# Patient Record
Sex: FEMALE | Race: BLACK OR AFRICAN AMERICAN | Employment: OTHER | URBAN - METROPOLITAN AREA
[De-identification: names, ages, dates, MRNs, and addresses within clinical notes are randomized per-mention and may not be internally consistent; named-entity substitution may affect disease eponyms.]

---

## 2017-07-07 ENCOUNTER — HOSPITAL ENCOUNTER (OUTPATIENT)
Dept: ULTRASOUND IMAGING | Age: 67
Discharge: HOME | End: 2017-07-07
Attending: INTERNAL MEDICINE
Payer: COMMERCIAL

## 2017-07-07 DIAGNOSIS — E04.9 GOITER: ICD-10-CM

## 2017-07-07 PROCEDURE — 76536 US EXAM OF HEAD AND NECK: CPT

## 2018-02-12 ENCOUNTER — HOSPITAL ENCOUNTER (OUTPATIENT)
Dept: MAMMOGRAPHY | Age: 68
Discharge: HOME | End: 2018-02-12
Attending: FAMILY MEDICINE
Payer: COMMERCIAL

## 2018-02-12 DIAGNOSIS — Z12.39 SCREENING FOR MALIGNANT NEOPLASM OF BREAST: ICD-10-CM

## 2018-02-12 PROCEDURE — 77067 SCR MAMMO BI INCL CAD: CPT

## 2018-05-08 ENCOUNTER — HOSPITAL ENCOUNTER (OUTPATIENT)
Dept: MAMMOGRAPHY | Age: 68
Discharge: HOME | End: 2018-05-08
Attending: FAMILY MEDICINE
Payer: COMMERCIAL

## 2018-05-08 DIAGNOSIS — Z78.0 MENOPAUSE: ICD-10-CM

## 2018-05-08 PROCEDURE — 77080 DXA BONE DENSITY AXIAL: CPT

## 2018-07-13 ENCOUNTER — HOSPITAL ENCOUNTER (OUTPATIENT)
Dept: ULTRASOUND IMAGING | Age: 68
Discharge: HOME | End: 2018-07-13
Attending: INTERNAL MEDICINE
Payer: COMMERCIAL

## 2018-07-13 DIAGNOSIS — E04.9 GOITER: ICD-10-CM

## 2018-07-13 PROCEDURE — 76536 US EXAM OF HEAD AND NECK: CPT

## 2019-02-15 ENCOUNTER — HOSPITAL ENCOUNTER (OUTPATIENT)
Dept: MAMMOGRAPHY | Age: 69
Discharge: HOME | End: 2019-02-15
Attending: FAMILY MEDICINE
Payer: COMMERCIAL

## 2019-02-15 DIAGNOSIS — Z12.39 SCREENING BREAST EXAMINATION: ICD-10-CM

## 2019-02-15 PROCEDURE — 77067 SCR MAMMO BI INCL CAD: CPT

## 2020-10-13 ENCOUNTER — TRANSCRIBE ORDER (OUTPATIENT)
Dept: SCHEDULING | Age: 70
End: 2020-10-13

## 2020-10-13 DIAGNOSIS — Z12.31 BREAST CANCER SCREENING BY MAMMOGRAM: Primary | ICD-10-CM

## 2020-11-05 ENCOUNTER — HOSPITAL ENCOUNTER (OUTPATIENT)
Dept: MAMMOGRAPHY | Age: 70
Discharge: HOME | End: 2020-11-05
Attending: FAMILY MEDICINE
Payer: COMMERCIAL

## 2020-11-05 DIAGNOSIS — Z12.31 BREAST CANCER SCREENING BY MAMMOGRAM: ICD-10-CM

## 2020-11-05 PROCEDURE — 77063 BREAST TOMOSYNTHESIS BI: CPT

## 2021-10-13 ENCOUNTER — TRANSCRIBE ORDER (OUTPATIENT)
Dept: SCHEDULING | Age: 71
End: 2021-10-13

## 2021-10-13 DIAGNOSIS — Z12.31 SCREENING MAMMOGRAM FOR HIGH-RISK PATIENT: Primary | ICD-10-CM

## 2021-12-02 ENCOUNTER — HOSPITAL ENCOUNTER (OUTPATIENT)
Dept: MAMMOGRAPHY | Age: 71
Discharge: HOME | End: 2021-12-02
Attending: FAMILY MEDICINE
Payer: MEDICARE

## 2021-12-02 DIAGNOSIS — Z12.31 SCREENING MAMMOGRAM FOR HIGH-RISK PATIENT: ICD-10-CM

## 2021-12-02 PROCEDURE — 77067 SCR MAMMO BI INCL CAD: CPT

## 2022-06-02 ENCOUNTER — TRANSCRIBE ORDER (OUTPATIENT)
Dept: SCHEDULING | Age: 72
End: 2022-06-02

## 2022-06-02 DIAGNOSIS — E04.2 MULTINODULAR GOITER: Primary | ICD-10-CM

## 2022-06-09 ENCOUNTER — HOSPITAL ENCOUNTER (OUTPATIENT)
Dept: ULTRASOUND IMAGING | Age: 72
Discharge: HOME | End: 2022-06-09
Attending: INTERNAL MEDICINE
Payer: MEDICARE

## 2022-06-09 DIAGNOSIS — E04.2 MULTINODULAR GOITER: ICD-10-CM

## 2022-06-09 PROCEDURE — 76536 US EXAM OF HEAD AND NECK: CPT

## 2022-07-26 ENCOUNTER — APPOINTMENT (OUTPATIENT)
Dept: CT IMAGING | Age: 72
DRG: 163 | End: 2022-07-26
Attending: EMERGENCY MEDICINE
Payer: MEDICARE

## 2022-07-26 ENCOUNTER — HOSPITAL ENCOUNTER (INPATIENT)
Age: 72
LOS: 7 days | Discharge: HOME | DRG: 163 | End: 2022-08-02
Attending: EMERGENCY MEDICINE | Admitting: INTERNAL MEDICINE
Payer: MEDICARE

## 2022-07-26 ENCOUNTER — APPOINTMENT (OUTPATIENT)
Dept: GENERAL RADIOLOGY | Age: 72
DRG: 163 | End: 2022-07-26
Attending: EMERGENCY MEDICINE
Payer: MEDICARE

## 2022-07-26 DIAGNOSIS — R07.9 CHEST PAIN, UNSPECIFIED TYPE: ICD-10-CM

## 2022-07-26 DIAGNOSIS — I21.4 NSTEMI (NON-ST ELEVATED MYOCARDIAL INFARCTION) (HCC): Primary | ICD-10-CM

## 2022-07-26 LAB
ALBUMIN SERPL-MCNC: 3 G/DL (ref 3.5–5)
ALBUMIN/GLOB SERPL: 0.8 {RATIO} (ref 1.1–2.2)
ALP SERPL-CCNC: 90 U/L (ref 45–117)
ALT SERPL-CCNC: 110 U/L (ref 12–78)
ANION GAP SERPL CALC-SCNC: 9 MMOL/L (ref 5–15)
APTT PPP: 20.6 SEC (ref 21.2–34.1)
AST SERPL W P-5'-P-CCNC: 109 U/L (ref 15–37)
BASOPHILS # BLD: 0 K/UL (ref 0–0.1)
BASOPHILS NFR BLD: 0 % (ref 0–1)
BILIRUB SERPL-MCNC: 1 MG/DL (ref 0.2–1)
BUN SERPL-MCNC: 16 MG/DL (ref 6–20)
BUN/CREAT SERPL: 21 (ref 12–20)
CA-I BLD-MCNC: 8.2 MG/DL (ref 8.5–10.1)
CHLORIDE SERPL-SCNC: 112 MMOL/L (ref 97–108)
CO2 SERPL-SCNC: 21 MMOL/L (ref 21–32)
CREAT SERPL-MCNC: 0.75 MG/DL (ref 0.55–1.02)
DIFFERENTIAL METHOD BLD: ABNORMAL
EOSINOPHIL # BLD: 0 K/UL (ref 0–0.4)
EOSINOPHIL NFR BLD: 0 % (ref 0–7)
ERYTHROCYTE [DISTWIDTH] IN BLOOD BY AUTOMATED COUNT: 14.4 % (ref 11.5–14.5)
GLOBULIN SER CALC-MCNC: 3.7 G/DL (ref 2–4)
GLUCOSE SERPL-MCNC: 110 MG/DL (ref 65–100)
HCT VFR BLD AUTO: 27.5 % (ref 35–47)
HGB BLD-MCNC: 8.9 G/DL (ref 11.5–16)
IMM GRANULOCYTES # BLD AUTO: 0 K/UL (ref 0–0.04)
IMM GRANULOCYTES NFR BLD AUTO: 0 % (ref 0–0.5)
LYMPHOCYTES # BLD: 1.2 K/UL (ref 0.8–3.5)
LYMPHOCYTES NFR BLD: 17 % (ref 12–49)
MAGNESIUM SERPL-MCNC: 1.9 MG/DL (ref 1.6–2.4)
MCH RBC QN AUTO: 29 PG (ref 26–34)
MCHC RBC AUTO-ENTMCNC: 32.4 G/DL (ref 30–36.5)
MCV RBC AUTO: 89.6 FL (ref 80–99)
MONOCYTES # BLD: 0.4 K/UL (ref 0–1)
MONOCYTES NFR BLD: 5 % (ref 5–13)
NEUTS SEG # BLD: 5.4 K/UL (ref 1.8–8)
NEUTS SEG NFR BLD: 78 % (ref 32–75)
NRBC # BLD: 0 K/UL (ref 0–0.01)
NRBC BLD-RTO: 0 PER 100 WBC
PLATELET # BLD AUTO: 97 K/UL (ref 150–400)
PMV BLD AUTO: 12.3 FL (ref 8.9–12.9)
POTASSIUM SERPL-SCNC: 3.8 MMOL/L (ref 3.5–5.1)
PROT SERPL-MCNC: 6.7 G/DL (ref 6.4–8.2)
RBC # BLD AUTO: 3.07 M/UL (ref 3.8–5.2)
SODIUM SERPL-SCNC: 142 MMOL/L (ref 136–145)
THERAPEUTIC RANGE,PTTT: ABNORMAL SEC (ref 82–109)
TROPONIN-HIGH SENSITIVITY: 341 NG/L (ref 0–51)
WBC # BLD AUTO: 7 K/UL (ref 3.6–11)

## 2022-07-26 PROCEDURE — 71045 X-RAY EXAM CHEST 1 VIEW: CPT

## 2022-07-26 PROCEDURE — 70450 CT HEAD/BRAIN W/O DYE: CPT

## 2022-07-26 PROCEDURE — 85730 THROMBOPLASTIN TIME PARTIAL: CPT

## 2022-07-26 PROCEDURE — 85025 COMPLETE CBC W/AUTO DIFF WBC: CPT

## 2022-07-26 PROCEDURE — 80053 COMPREHEN METABOLIC PANEL: CPT

## 2022-07-26 PROCEDURE — 36415 COLL VENOUS BLD VENIPUNCTURE: CPT

## 2022-07-26 PROCEDURE — 96374 THER/PROPH/DIAG INJ IV PUSH: CPT

## 2022-07-26 PROCEDURE — 84484 ASSAY OF TROPONIN QUANT: CPT

## 2022-07-26 PROCEDURE — 74011250636 HC RX REV CODE- 250/636: Performed by: EMERGENCY MEDICINE

## 2022-07-26 PROCEDURE — 65270000029 HC RM PRIVATE

## 2022-07-26 PROCEDURE — 93005 ELECTROCARDIOGRAM TRACING: CPT

## 2022-07-26 PROCEDURE — 99285 EMERGENCY DEPT VISIT HI MDM: CPT

## 2022-07-26 PROCEDURE — 83735 ASSAY OF MAGNESIUM: CPT

## 2022-07-26 RX ORDER — HEPARIN SODIUM 1000 [USP'U]/ML
4000 INJECTION, SOLUTION INTRAVENOUS; SUBCUTANEOUS AS NEEDED
Status: DISCONTINUED | OUTPATIENT
Start: 2022-07-26 | End: 2022-07-31

## 2022-07-26 RX ORDER — LOSARTAN POTASSIUM 25 MG/1
25 TABLET ORAL DAILY
Status: DISCONTINUED | OUTPATIENT
Start: 2022-07-27 | End: 2022-08-02 | Stop reason: HOSPADM

## 2022-07-26 RX ORDER — NITROGLYCERIN 0.4 MG/1
0.4 TABLET SUBLINGUAL AS NEEDED
Status: DISCONTINUED | OUTPATIENT
Start: 2022-07-26 | End: 2022-08-02 | Stop reason: HOSPADM

## 2022-07-26 RX ORDER — ONDANSETRON 2 MG/ML
4 INJECTION INTRAMUSCULAR; INTRAVENOUS
Status: DISCONTINUED | OUTPATIENT
Start: 2022-07-26 | End: 2022-08-02 | Stop reason: HOSPADM

## 2022-07-26 RX ORDER — SODIUM CHLORIDE 0.9 % (FLUSH) 0.9 %
5-40 SYRINGE (ML) INJECTION EVERY 8 HOURS
Status: DISCONTINUED | OUTPATIENT
Start: 2022-07-26 | End: 2022-08-02 | Stop reason: HOSPADM

## 2022-07-26 RX ORDER — ACETAMINOPHEN 325 MG/1
650 TABLET ORAL
Status: DISCONTINUED | OUTPATIENT
Start: 2022-07-26 | End: 2022-08-02 | Stop reason: HOSPADM

## 2022-07-26 RX ORDER — ATORVASTATIN CALCIUM 40 MG/1
40 TABLET, FILM COATED ORAL
Status: DISCONTINUED | OUTPATIENT
Start: 2022-07-26 | End: 2022-07-27

## 2022-07-26 RX ORDER — ASPIRIN 325 MG
325 TABLET ORAL
Status: DISPENSED | OUTPATIENT
Start: 2022-07-26 | End: 2022-07-27

## 2022-07-26 RX ORDER — ONDANSETRON 4 MG/1
4 TABLET, ORALLY DISINTEGRATING ORAL
Status: DISCONTINUED | OUTPATIENT
Start: 2022-07-26 | End: 2022-08-02 | Stop reason: HOSPADM

## 2022-07-26 RX ORDER — HEPARIN SODIUM 10000 [USP'U]/100ML
12-25 INJECTION, SOLUTION INTRAVENOUS
Status: DISCONTINUED | OUTPATIENT
Start: 2022-07-26 | End: 2022-07-31

## 2022-07-26 RX ORDER — METOPROLOL SUCCINATE 25 MG/1
25 TABLET, EXTENDED RELEASE ORAL DAILY
Status: DISCONTINUED | OUTPATIENT
Start: 2022-07-27 | End: 2022-07-31

## 2022-07-26 RX ORDER — POLYETHYLENE GLYCOL 3350 17 G/17G
17 POWDER, FOR SOLUTION ORAL DAILY PRN
Status: DISCONTINUED | OUTPATIENT
Start: 2022-07-26 | End: 2022-08-02 | Stop reason: HOSPADM

## 2022-07-26 RX ORDER — HEPARIN SODIUM 1000 [USP'U]/ML
4000 INJECTION, SOLUTION INTRAVENOUS; SUBCUTANEOUS ONCE
Status: COMPLETED | OUTPATIENT
Start: 2022-07-26 | End: 2022-07-26

## 2022-07-26 RX ORDER — ACETAMINOPHEN 650 MG/1
650 SUPPOSITORY RECTAL
Status: DISCONTINUED | OUTPATIENT
Start: 2022-07-26 | End: 2022-08-02 | Stop reason: HOSPADM

## 2022-07-26 RX ORDER — SODIUM CHLORIDE 0.9 % (FLUSH) 0.9 %
5-40 SYRINGE (ML) INJECTION AS NEEDED
Status: DISCONTINUED | OUTPATIENT
Start: 2022-07-26 | End: 2022-08-02 | Stop reason: HOSPADM

## 2022-07-26 RX ORDER — HEPARIN SODIUM 1000 [USP'U]/ML
2000 INJECTION, SOLUTION INTRAVENOUS; SUBCUTANEOUS AS NEEDED
Status: DISCONTINUED | OUTPATIENT
Start: 2022-07-26 | End: 2022-07-31

## 2022-07-26 RX ADMIN — HEPARIN SODIUM 12 UNITS/KG/HR: 10000 INJECTION, SOLUTION INTRAVENOUS at 22:11

## 2022-07-26 RX ADMIN — HEPARIN SODIUM 4000 UNITS: 1000 INJECTION, SOLUTION INTRAVENOUS; SUBCUTANEOUS at 22:10

## 2022-07-26 NOTE — ED TRIAGE NOTES
EMS dispatched for person c/o chest pain, SOB, Nausea, dizziness for the past 2 days.   EMS gave Zofran and 200ml IVF (glu 174)

## 2022-07-26 NOTE — ED PROVIDER NOTES
EMERGENCY DEPARTMENT HISTORY AND PHYSICAL EXAM      Date: 7/26/2022  Patient Name: Alicia Kirk    History of Presenting Illness     Chief Complaint   Patient presents with    Chest Pain    Dizziness    Shortness of Breath       History Provided By: Patient    HPI: Alicia Kirk, 67 y.o. female with a significant past medical history of hypertension and vertigo presents to the ED with 2 days history of shortness of breath and dizziness. Patient reports the dizziness is unsteadiness, worse with standing, improved with rest.  Patient reports the pain is sharp, substernal, without radiation, without noted aggravating relieving factors. There are no other complaints, changes, or physical findings at this time. PCP: Lucrecia Kenney, DO    No current facility-administered medications on file prior to encounter. No current outpatient medications on file prior to encounter. Past History     Past Medical History:  Past Medical History:   Diagnosis Date    Hypertension        Past Surgical History:  History reviewed. No pertinent surgical history. Family History:  History reviewed. No pertinent family history. Social History:  Social History     Tobacco Use    Smoking status: Never    Smokeless tobacco: Never   Substance Use Topics    Alcohol use: Never    Drug use: Never       Allergies: Allergies   Allergen Reactions    Codeine Other (comments)     Gets real dizzy and nauseated. Nsaids (Non-Steroidal Anti-Inflammatory Drug) Other (comments)     Instant h/a and stomach burning. Sulfa (Sulfonamide Antibiotics) Hives     Rash that looks like measles. Review of Systems   Review of Systems  Review of Systems   Constitutional: Negative for chills and fever. HENT: Negative for sinus pressure and sinus pain. Eyes: Negative for photophobia and redness. Respiratory: Positive for shortness of breath and negative for wheezing.     Cardiovascular: Positive for chest pain and negative for palpitations. Gastrointestinal: Negative for abdominal pain and positive for nausea. Genitourinary: Negative for flank pain and hematuria. Musculoskeletal: Negative for arthralgias and gait problem. Skin: Negative for color change and pallor. Neurological: Positive for dizziness and weakness. Physical Exam   Physical Exam  Physical Exam  Constitutional:       General: No acute distress. Appearance: Normal appearance. Not toxic-appearing. HENT:      Head: Normocephalic and atraumatic. Nose: Nose normal.      Mouth/Throat:      Mouth: Mucous membranes are moist.   Eyes:      Extraocular Movements: Extraocular movements intact. Pupils: Pupils are equal, round, and reactive to light. Cardiovascular:      Rate and Rhythm: Normal rate. S1-S2 without murmurs rubs or gallops     Pulses: Normal pulses. Pulmonary:      Effort: Pulmonary effort is normal.      Breath sounds: No stridor, clear to auscultation bilaterally  Abdominal:      General: Abdomen is flat. There is no distension. Musculoskeletal:         General: Normal range of motion. Cervical back: Normal range of motion and neck supple. Skin:     General: Skin is warm and dry. Capillary Refill: Capillary refill takes less than 2 seconds. Neurological:      General: No focal deficit present. No focal neurodeficits. Mental Status: Alert and oriented to person, place, and time.    Psychiatric:         Mood and Affect: Mood normal.         Behavior: Behavior normal.    Lab and Diagnostic Study Results   Labs -     Recent Results (from the past 12 hour(s))   CBC WITH AUTOMATED DIFF    Collection Time: 07/26/22  7:00 PM   Result Value Ref Range    WBC 7.0 3.6 - 11.0 K/uL    RBC 3.07 (L) 3.80 - 5.20 M/uL    HGB 8.9 (L) 11.5 - 16.0 g/dL    HCT 27.5 (L) 35.0 - 47.0 %    MCV 89.6 80.0 - 99.0 FL    MCH 29.0 26.0 - 34.0 PG    MCHC 32.4 30.0 - 36.5 g/dL    RDW 14.4 11.5 - 14.5 %    PLATELET 97 (L) 974 - 400 K/uL MPV 12.3 8.9 - 12.9 FL    NRBC 0.0 0.0  WBC    ABSOLUTE NRBC 0.00 0.00 - 0.01 K/uL    NEUTROPHILS 78 (H) 32 - 75 %    LYMPHOCYTES 17 12 - 49 %    MONOCYTES 5 5 - 13 %    EOSINOPHILS 0 0 - 7 %    BASOPHILS 0 0 - 1 %    IMMATURE GRANULOCYTES 0 0 - 0.5 %    ABS. NEUTROPHILS 5.4 1.8 - 8.0 K/UL    ABS. LYMPHOCYTES 1.2 0.8 - 3.5 K/UL    ABS. MONOCYTES 0.4 0.0 - 1.0 K/UL    ABS. EOSINOPHILS 0.0 0.0 - 0.4 K/UL    ABS. BASOPHILS 0.0 0.0 - 0.1 K/UL    ABS. IMM. GRANS. 0.0 0.00 - 0.04 K/UL    DF AUTOMATED     METABOLIC PANEL, COMPREHENSIVE    Collection Time: 07/26/22  7:00 PM   Result Value Ref Range    Sodium 142 136 - 145 mmol/L    Potassium 3.8 3.5 - 5.1 mmol/L    Chloride 112 (H) 97 - 108 mmol/L    CO2 21 21 - 32 mmol/L    Anion gap 9 5 - 15 mmol/L    Glucose 110 (H) 65 - 100 mg/dL    BUN 16 6 - 20 mg/dL    Creatinine 0.75 0.55 - 1.02 mg/dL    BUN/Creatinine ratio 21 (H) 12 - 20      GFR est AA >60 >60 ml/min/1.73m2    GFR est non-AA >60 >60 ml/min/1.73m2    Calcium 8.2 (L) 8.5 - 10.1 mg/dL    Bilirubin, total 1.0 0.2 - 1.0 mg/dL    AST (SGOT) 109 (H) 15 - 37 U/L    ALT (SGPT) 110 (H) 12 - 78 U/L    Alk. phosphatase 90 45 - 117 U/L    Protein, total 6.7 6.4 - 8.2 g/dL    Albumin 3.0 (L) 3.5 - 5.0 g/dL    Globulin 3.7 2.0 - 4.0 g/dL    A-G Ratio 0.8 (L) 1.1 - 2.2     TROPONIN-HIGH SENSITIVITY    Collection Time: 07/26/22  7:00 PM   Result Value Ref Range    Troponin-High Sensitivity 341 (HH) 0 - 51 ng/L   MAGNESIUM    Collection Time: 07/26/22  7:00 PM   Result Value Ref Range    Magnesium 1.9 1.6 - 2.4 mg/dL   PTT    Collection Time: 07/26/22  9:35 PM   Result Value Ref Range    aPTT 20.6 (L) 21.2 - 34.1 sec    aPTT, therapeutic range   82 - 109 sec       Radiologic Studies -   @lastxrresult@  CT Results  (Last 48 hours)                 07/26/22 1938  CT HEAD WO CONT Final result    Impression:  No acute intracranial process.            Narrative:  CLINICAL HISTORY: Dizziness/unsteadiness   INDICATION: Dizziness/unsteadiness   COMPARISON: None. CT dose reduction was achieved through use of a standardized protocol tailored   for this examination and automatic exposure control for dose modulation. TECHNIQUE: Serial axial images with a collimation of 5 mm were obtained from the   skull base through the vertex     FINDINGS:    The sulci and ventricles are within normal limits for patient age. There is no   evidence of an acute infarction, hemorrhage, or mass-effect. There is no   evidence of midline shift or hydrocephalus. Posterior fossa structures are   unremarkable. No extra-axial collections are seen. Mastoid air cells are well pneumatized and clear. There is no evidence of depressed skull fractures of soft tissue swelling. CXR Results  (Last 48 hours)                 07/26/22 1918  XR CHEST PORT Final result    Impression:  No acute process. Narrative: Indication: Chest pain       Comparison: None       Portable exam of the chest obtained at [address] demonstrates normal heart size. There is no acute process in the lung fields. The osseous structures are   unremarkable. Medical Decision Making and ED Course   - I am the first provider for this patient. I reviewed the vital signs, available nursing notes, past medical history, past surgical history, family history and social history. - Initial assessment performed. The patients presenting problems have been discussed, and they are in agreement with the care plan formulated and outlined with them. I have encouraged them to ask questions as they arise throughout their visit. Vital Signs-Reviewed the patient's vital signs.   Patient Vitals for the past 12 hrs:   Temp Pulse Resp BP SpO2   07/26/22 2048 -- (!) 104 -- 125/77 --   07/26/22 1821 -- (!) 103 27 125/77 98 %   07/26/22 1751 97.7 °F (36.5 °C) (!) 103 19 122/79 94 %            ED Course:   ED Course as of 07/26/22 2258 Tue Jul 26, 2022 2046 Discussed with patient the findings concerning for non-STEMI. No EKG for comparison, though there are some T wave abnormalities. Patient reports her cardiologist is Dr. Lokesh Frank with whom she has a telephone appointment tomorrow as he has recently done lower extremity Dopplers. Patient states she previously was on Xarelto but is not at this time, states it was only for her surgery. [CS]      ED Course User Index  [CS] Hema Mcnair MD       Disposition   Disposition: Admitted to Floor Medical Floor the case was discussed with the admitting physician        Diagnosis/Clinical Impression     Clinical Impression:   1. NSTEMI (non-ST elevated myocardial infarction) (HCC)    2. Chest pain, unspecified type        Attestations: I, Milady Veras MD, am the primary clinician of record. Please note that this dictation was completed with [employer], the computer voice recognition software. Quite often unanticipated grammatical, syntax, homophones, and other interpretive errors are inadvertently transcribed by the computer software. Please disregard these errors. Please excuse any errors that have escaped final proofreading. Thank you.

## 2022-07-27 ENCOUNTER — APPOINTMENT (OUTPATIENT)
Dept: CT IMAGING | Age: 72
DRG: 163 | End: 2022-07-27
Attending: INTERNAL MEDICINE
Payer: MEDICARE

## 2022-07-27 ENCOUNTER — APPOINTMENT (OUTPATIENT)
Dept: NON INVASIVE DIAGNOSTICS | Age: 72
DRG: 163 | End: 2022-07-27
Attending: INTERNAL MEDICINE
Payer: MEDICARE

## 2022-07-27 LAB
ANION GAP SERPL CALC-SCNC: 8 MMOL/L (ref 5–15)
APTT PPP: 146.3 SEC (ref 21.2–34.1)
APTT PPP: 56.8 SEC (ref 21.2–34.1)
APTT PPP: 73.1 SEC (ref 21.2–34.1)
ATRIAL RATE: 104 BPM
BUN SERPL-MCNC: 26 MG/DL (ref 6–20)
BUN/CREAT SERPL: 28 (ref 12–20)
CA-I BLD-MCNC: 8.9 MG/DL (ref 8.5–10.1)
CALCULATED P AXIS, ECG09: 27 DEGREES
CALCULATED R AXIS, ECG10: -3 DEGREES
CALCULATED T AXIS, ECG11: -56 DEGREES
CHLORIDE SERPL-SCNC: 110 MMOL/L (ref 97–108)
CO2 SERPL-SCNC: 23 MMOL/L (ref 21–32)
CREAT SERPL-MCNC: 0.92 MG/DL (ref 0.55–1.02)
D DIMER PPP FEU-MCNC: 13.42 UG/ML(FEU)
DIAGNOSIS, 93000: NORMAL
ECHO AO ROOT DIAM: 3.1 CM
ECHO AO ROOT INDEX: 1.55 CM/M2
ECHO AV PEAK GRADIENT: 4 MMHG
ECHO AV PEAK VELOCITY: 1.1 M/S
ECHO AV VELOCITY RATIO: 0.91
ECHO EST RA PRESSURE: 15 MMHG
ECHO LA DIAMETER INDEX: 1.5 CM/M2
ECHO LA DIAMETER: 3 CM
ECHO LA TO AORTIC ROOT RATIO: 0.97
ECHO LV E' LATERAL VELOCITY: 6 CM/S
ECHO LV E' SEPTAL VELOCITY: 5 CM/S
ECHO LV EJECTION FRACTION BIPLANE: 68 % (ref 55–100)
ECHO LV FRACTIONAL SHORTENING: 35 % (ref 28–44)
ECHO LV INTERNAL DIMENSION DIASTOLE INDEX: 1.3 CM/M2
ECHO LV INTERNAL DIMENSION DIASTOLIC: 2.6 CM (ref 3.9–5.3)
ECHO LV INTERNAL DIMENSION SYSTOLIC INDEX: 0.85 CM/M2
ECHO LV INTERNAL DIMENSION SYSTOLIC: 1.7 CM
ECHO LV IVSD: 1.5 CM (ref 0.6–0.9)
ECHO LV MASS 2D: 132.1 G (ref 67–162)
ECHO LV MASS INDEX 2D: 66 G/M2 (ref 43–95)
ECHO LV POSTERIOR WALL DIASTOLIC: 1.5 CM (ref 0.6–0.9)
ECHO LV RELATIVE WALL THICKNESS RATIO: 1.15
ECHO LVOT PEAK GRADIENT: 4 MMHG
ECHO LVOT PEAK VELOCITY: 1 M/S
ECHO MV A VELOCITY: 0.7 M/S
ECHO MV E DECELERATION TIME (DT): 169 MS
ECHO MV E VELOCITY: 0.41 M/S
ECHO MV E/A RATIO: 0.59
ECHO MV E/E' LATERAL: 6.83
ECHO MV E/E' RATIO (AVERAGED): 7.52
ECHO MV E/E' SEPTAL: 8.2
ECHO RIGHT VENTRICULAR SYSTOLIC PRESSURE (RVSP): 56 MMHG
ECHO RV INTERNAL DIMENSION: 4.1 CM
ECHO RV TAPSE: 1.8 CM (ref 1.7–?)
ECHO TV REGURGITANT MAX VELOCITY: 3.21 M/S
ECHO TV REGURGITANT PEAK GRADIENT: 41 MMHG
ERYTHROCYTE [DISTWIDTH] IN BLOOD BY AUTOMATED COUNT: 14.4 % (ref 11.5–14.5)
FERRITIN SERPL-MCNC: 280 NG/ML (ref 8–252)
FOLATE SERPL-MCNC: 17 NG/ML (ref 5–21)
GLUCOSE SERPL-MCNC: 136 MG/DL (ref 65–100)
HAPTOGLOB SERPL-MCNC: 175 MG/DL (ref 30–200)
HCT VFR BLD AUTO: 35.5 % (ref 35–47)
HGB BLD-MCNC: 11.9 G/DL (ref 11.5–16)
IRON SATN MFR SERPL: 27 % (ref 20–50)
IRON SERPL-MCNC: 76 UG/DL (ref 35–150)
LDH SERPL L TO P-CCNC: 272 U/L (ref 81–246)
MCH RBC QN AUTO: 29.4 PG (ref 26–34)
MCHC RBC AUTO-ENTMCNC: 33.5 G/DL (ref 30–36.5)
MCV RBC AUTO: 87.7 FL (ref 80–99)
NRBC # BLD: 0 K/UL (ref 0–0.01)
NRBC BLD-RTO: 0 PER 100 WBC
P-R INTERVAL, ECG05: 122 MS
PLATELET # BLD AUTO: 124 K/UL (ref 150–400)
PMV BLD AUTO: 11.8 FL (ref 8.9–12.9)
POTASSIUM SERPL-SCNC: 3.4 MMOL/L (ref 3.5–5.1)
Q-T INTERVAL, ECG07: 388 MS
QRS DURATION, ECG06: 82 MS
QTC CALCULATION (BEZET), ECG08: 510 MS
RBC # BLD AUTO: 4.05 M/UL (ref 3.8–5.2)
RETICS # AUTO: 0.1 M/UL (ref 0.02–0.08)
RETICS/RBC NFR AUTO: 2.3 % (ref 0.7–2.1)
SODIUM SERPL-SCNC: 141 MMOL/L (ref 136–145)
THERAPEUTIC RANGE,PTTT: ABNORMAL SEC (ref 82–109)
TIBC SERPL-MCNC: 284 UG/DL (ref 250–450)
TROPONIN-HIGH SENSITIVITY: 152 NG/L (ref 0–51)
TROPONIN-HIGH SENSITIVITY: 280 NG/L (ref 0–51)
TROPONIN-HIGH SENSITIVITY: 386 NG/L (ref 0–51)
VENTRICULAR RATE, ECG03: 104 BPM
VIT B12 SERPL-MCNC: 337 PG/ML (ref 193–986)
WBC # BLD AUTO: 9.7 K/UL (ref 3.6–11)

## 2022-07-27 PROCEDURE — 74011250637 HC RX REV CODE- 250/637: Performed by: INTERNAL MEDICINE

## 2022-07-27 PROCEDURE — 82607 VITAMIN B-12: CPT

## 2022-07-27 PROCEDURE — 74011250636 HC RX REV CODE- 250/636: Performed by: INTERNAL MEDICINE

## 2022-07-27 PROCEDURE — 65270000029 HC RM PRIVATE

## 2022-07-27 PROCEDURE — 83615 LACTATE (LD) (LDH) ENZYME: CPT

## 2022-07-27 PROCEDURE — 85730 THROMBOPLASTIN TIME PARTIAL: CPT

## 2022-07-27 PROCEDURE — 85045 AUTOMATED RETICULOCYTE COUNT: CPT

## 2022-07-27 PROCEDURE — 83540 ASSAY OF IRON: CPT

## 2022-07-27 PROCEDURE — 82728 ASSAY OF FERRITIN: CPT

## 2022-07-27 PROCEDURE — 84484 ASSAY OF TROPONIN QUANT: CPT

## 2022-07-27 PROCEDURE — 93306 TTE W/DOPPLER COMPLETE: CPT

## 2022-07-27 PROCEDURE — 80048 BASIC METABOLIC PNL TOTAL CA: CPT

## 2022-07-27 PROCEDURE — 83010 ASSAY OF HAPTOGLOBIN QUANT: CPT

## 2022-07-27 PROCEDURE — 74011000250 HC RX REV CODE- 250: Performed by: INTERNAL MEDICINE

## 2022-07-27 PROCEDURE — 71275 CT ANGIOGRAPHY CHEST: CPT

## 2022-07-27 PROCEDURE — 36415 COLL VENOUS BLD VENIPUNCTURE: CPT

## 2022-07-27 PROCEDURE — 85379 FIBRIN DEGRADATION QUANT: CPT

## 2022-07-27 PROCEDURE — 74011000636 HC RX REV CODE- 636: Performed by: INTERNAL MEDICINE

## 2022-07-27 PROCEDURE — 85027 COMPLETE CBC AUTOMATED: CPT

## 2022-07-27 RX ORDER — METOPROLOL SUCCINATE 25 MG/1
25 TABLET, EXTENDED RELEASE ORAL DAILY
COMMUNITY
End: 2022-08-02

## 2022-07-27 RX ORDER — ICOSAPENT ETHYL 1000 MG/1
2 CAPSULE ORAL 2 TIMES DAILY WITH MEALS
COMMUNITY

## 2022-07-27 RX ORDER — ACETAMINOPHEN 500 MG/1
500 CAPSULE, LIQUID FILLED ORAL AS NEEDED
COMMUNITY

## 2022-07-27 RX ORDER — LOSARTAN POTASSIUM 25 MG/1
25 TABLET ORAL DAILY
COMMUNITY

## 2022-07-27 RX ORDER — ROSUVASTATIN CALCIUM 5 MG/1
20 TABLET, COATED ORAL
Status: DISCONTINUED | OUTPATIENT
Start: 2022-07-27 | End: 2022-07-30

## 2022-07-27 RX ADMIN — HEPARIN SODIUM 2000 UNITS: 1000 INJECTION, SOLUTION INTRAVENOUS; SUBCUTANEOUS at 05:47

## 2022-07-27 RX ADMIN — METOPROLOL SUCCINATE 25 MG: 25 TABLET, EXTENDED RELEASE ORAL at 11:07

## 2022-07-27 RX ADMIN — IOPAMIDOL 100 ML: 755 INJECTION, SOLUTION INTRAVENOUS at 18:07

## 2022-07-27 RX ADMIN — LOSARTAN POTASSIUM 25 MG: 50 TABLET, FILM COATED ORAL at 11:07

## 2022-07-27 RX ADMIN — HEPARIN SODIUM 16 UNITS/KG/HR: 10000 INJECTION, SOLUTION INTRAVENOUS at 18:44

## 2022-07-27 RX ADMIN — SODIUM CHLORIDE, PRESERVATIVE FREE 10 ML: 5 INJECTION INTRAVENOUS at 21:01

## 2022-07-27 NOTE — ED NOTES
Pt. States she does not wear o2 at home. Writer removed oxygen from pt, o2 90% on room air.  Nasal cannula placed back on pt at 2L and o2 96%

## 2022-07-27 NOTE — H&P
History and Physical    Patient: Richard Garsia MRN: [id number]  SSN: [id number]    YOB: 1950  Age: 67 y.o. Sex: female      Subjective:      Richard Garsia is a 67 y.o. female with PMH of hypertension, HLP presented to the ED with a chief complaint of shortness of breath started yesterday. Worsening with mild exertion, associated with dizziness and left-sided chest pressure. Chest pressure is rated 5/10. Also has LE swelling, but attributes to previous knee surgery. Otherwise denies productive cough, wheezing, fever or chills. In ED, placed on 2 L nasal cannula. Troponin elevated at 300s. EKG showed no ST changes. Hemoglobin 8.9, plt 97, no baseline to compare. Past Medical History:   Diagnosis Date    Hypertension      History reviewed. No pertinent surgical history. History reviewed. No pertinent family history. Social History     Tobacco Use    Smoking status: Never    Smokeless tobacco: Never   Substance Use Topics    Alcohol use: Never      Prior to Admission medications    Not on File        Allergies   Allergen Reactions    Codeine Other (comments)     Gets real dizzy and nauseated. Nsaids (Non-Steroidal Anti-Inflammatory Drug) Other (comments)     Instant h/a and stomach burning. Sulfa (Sulfonamide Antibiotics) Hives     Rash that looks like measles.         Review of Systems:   Constitutional: No fevers, No chills, No fatigue, No weakness  Eyes: No visual disturbance  Ears, Nose, Mouth, Throat, and Face: No nasal congestion, No sore throat  Respiratory: See HPI  Cardiovascular: See HPI  Gastrointestinal: No nausea, No vomiting, No diarrhea, No constipation, No abdominal pain  Genitourinary: No frequency, No dysuria, No hematuria  Integument/Breast: No rash, No skin lesion(s), No dryness  Musculoskeletal: No arthralgias, No neck pain, No back pain  Neurological: No headaches, No dizziness, No confusion,  No seizures  Behavioral/Psychiatric: No anxiety, No depression      Objective:     Vitals:    07/26/22 2106 07/26/22 2206 07/26/22 2306 07/27/22 0036   BP: 126/68 122/76 134/86 129/70   Pulse: (!) 106 100 97 98   Resp: 19 (!) 31 (!) 33 (!) 34   Temp:    98.1 °F (36.7 °C)   SpO2:    96%   Weight:       Height:            Physical Exam:   General: alert, cooperative, no distress  Eye: conjunctivae/corneas clear. PERRL, EOM's intact. Throat and Neck: normal and no erythema or exudates noted. No mass   Lung: clear to auscultation bilaterally  Heart: regular rate and rhythm,   Abdomen: soft, non-tender. Bowel sounds normal. No masses,  Extremities: 1+ LE edema. able to move all extremities normal, atraumatic  Skin: Normal.  Neurologic: AOx3. Motor function and sensation grossly intact. Psychiatric: non focal    Recent Results (from the past 24 hour(s))   CBC WITH AUTOMATED DIFF    Collection Time: 07/26/22  7:00 PM   Result Value Ref Range    WBC 7.0 3.6 - 11.0 K/uL    RBC 3.07 (L) 3.80 - 5.20 M/uL    HGB 8.9 (L) 11.5 - 16.0 g/dL    HCT 27.5 (L) 35.0 - 47.0 %    MCV 89.6 80.0 - 99.0 FL    MCH 29.0 26.0 - 34.0 PG    MCHC 32.4 30.0 - 36.5 g/dL    RDW 14.4 11.5 - 14.5 %    PLATELET 97 (L) 802 - 400 K/uL    MPV 12.3 8.9 - 12.9 FL    NRBC 0.0 0.0  WBC    ABSOLUTE NRBC 0.00 0.00 - 0.01 K/uL    NEUTROPHILS 78 (H) 32 - 75 %    LYMPHOCYTES 17 12 - 49 %    MONOCYTES 5 5 - 13 %    EOSINOPHILS 0 0 - 7 %    BASOPHILS 0 0 - 1 %    IMMATURE GRANULOCYTES 0 0 - 0.5 %    ABS. NEUTROPHILS 5.4 1.8 - 8.0 K/UL    ABS. LYMPHOCYTES 1.2 0.8 - 3.5 K/UL    ABS. MONOCYTES 0.4 0.0 - 1.0 K/UL    ABS. EOSINOPHILS 0.0 0.0 - 0.4 K/UL    ABS. BASOPHILS 0.0 0.0 - 0.1 K/UL    ABS. IMM.  GRANS. 0.0 0.00 - 0.04 K/UL    DF AUTOMATED     METABOLIC PANEL, COMPREHENSIVE    Collection Time: 07/26/22  7:00 PM   Result Value Ref Range    Sodium 142 136 - 145 mmol/L    Potassium 3.8 3.5 - 5.1 mmol/L    Chloride 112 (H) 97 - 108 mmol/L    CO2 21 21 - 32 mmol/L    Anion gap 9 5 - 15 mmol/L    Glucose 110 (H) 65 - 100 mg/dL    BUN 16 6 - 20 mg/dL    Creatinine 0.75 0.55 - 1.02 mg/dL    BUN/Creatinine ratio 21 (H) 12 - 20      GFR est AA >60 >60 ml/min/1.73m2    GFR est non-AA >60 >60 ml/min/1.73m2    Calcium 8.2 (L) 8.5 - 10.1 mg/dL    Bilirubin, total 1.0 0.2 - 1.0 mg/dL    AST (SGOT) 109 (H) 15 - 37 U/L    ALT (SGPT) 110 (H) 12 - 78 U/L    Alk. phosphatase 90 45 - 117 U/L    Protein, total 6.7 6.4 - 8.2 g/dL    Albumin 3.0 (L) 3.5 - 5.0 g/dL    Globulin 3.7 2.0 - 4.0 g/dL    A-G Ratio 0.8 (L) 1.1 - 2.2     TROPONIN-HIGH SENSITIVITY    Collection Time: 07/26/22  7:00 PM   Result Value Ref Range    Troponin-High Sensitivity 341 (HH) 0 - 51 ng/L   MAGNESIUM    Collection Time: 07/26/22  7:00 PM   Result Value Ref Range    Magnesium 1.9 1.6 - 2.4 mg/dL   PTT    Collection Time: 07/26/22  9:35 PM   Result Value Ref Range    aPTT 20.6 (L) 21.2 - 34.1 sec    aPTT, therapeutic range   82 - 109 sec   TROPONIN-HIGH SENSITIVITY    Collection Time: 07/27/22 12:32 AM   Result Value Ref Range    Troponin-High Sensitivity 386 (HH) 0 - 51 ng/L       XR Results (maximum last 3): Results from Hospital Encounter encounter on 07/26/22    XR CHEST PORT    Narrative  Indication: Chest pain    Comparison: None    Portable exam of the chest obtained at [address] [place] [address] demonstrates normal heart size. There is no acute process in the lung fields. The osseous structures are  unremarkable. Impression  No acute process. CT Results (maximum last 3): Results from [place] encounter on 07/26/22    CT HEAD WO CONT    Narrative  CLINICAL HISTORY: Dizziness/unsteadiness  INDICATION: Dizziness/unsteadiness  COMPARISON: None. CT dose reduction was achieved through use of a standardized protocol tailored  for this examination and automatic exposure control for dose modulation.   TECHNIQUE: Serial axial images with a collimation of 5 mm were obtained from the  skull base through the vertex  FINDINGS:  The sulci and ventricles are within normal limits for patient age. There is no  evidence of an acute infarction, hemorrhage, or mass-effect. There is no  evidence of midline shift or hydrocephalus. Posterior fossa structures are  unremarkable. No extra-axial collections are seen. Mastoid air cells are well pneumatized and clear. There is no evidence of depressed skull fractures of soft tissue swelling. Impression  No acute intracranial process. Results from [place] encounter on 01/14/13    CT HEART W/O CONT WITH CALCIUM    Narrative  **Final Report**      ICD Codes / Adm. 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 TECHNIQUE: Unenhanced multislice helical CT of the heart was performed on a  64-slice multiple detector row CT system (from[employer]). Quantitative coronary  artery CT calcium scoring was performed using [employer] software. No intravenous  contrast was administered. FINDINGS:  The coronary calcium in each vessel is as follows:    Left main coronary artery: 22  Left anterior descending coronary artery: 0  Left circumflex coronary artery: 36  Right coronary artery: 0  Posterior descending coronary artery: 0    Total calcium score: 58    Calcium score interpretation:  0-0 = No evidence of CAD  1-10 = Minimal evidence of CAD   = Mild evidence of CAD  101-400 = Moderate evidence of CAD  >400 = Extensive evidence of CAD    Your score of 58 places you in the 75 percentile rank. That means that 25%  of the female aged 64-75 will have a higher calcium score than you. Chest CT findings:  The visualized lungs are clear of mass, nodule, airspace  or edema. The entire lung fields are not imaged on this examination. The  main pulmonary artery is enlarged, measuring 36 mm in diameter.  No  mediastinal mass or adenopathy is identified. The visualized portions of the  upper abdominal organs are normal on this unenhanced examination. IMPRESSION: Total calcium score of 58. Mild evidence of plaque. Mild  nonobstructive coronary artery disease is likely present. There is a less  than 10% chance of flow-limiting stenosis. At moderate risk of future  cardiac events. 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 Signing/Reading Doctor: Angel العراقي ([id number])  Agustín Portillo ([id number])  Jan 14 2013  1:14PM      MRI Results (maximum last 3): No results found for this or any previous visit. Nuclear Medicine Results (maximum last 3): Results from [place] encounter on 05/16/11    NM THYROID IMAGING W UPTAKE SINGLE    Narrative  **Final Report**      ICD Codes / Adm. Diagnosis: 240.9  240.9 / GOITER  GOITER, UNSPECIFIED  Examination:  NM THYROID SCAN W SINGLE UPTAKE  - [id number] - May 17 2011  3:20PM  Accession No:  [id number]  Reason:  goiter      REPORT:  CLINICAL HISTORY: The thyroid nodules. Comparison to ultrasound dated 01/06/2011. Thyroid scan was performed in the anterior, CENTENO, and [place] projections  utilizing 194.8 uCi I-123 orally. There is increased tracer activity in the  lower pole of the right gland. This likely represents a hot nodule. There is  more normal uptake in the left gland although it is somewhat diminutive  compared to the right. Uptake was determined at 24 hours and is 22.8%  (normal 15 to 30%). IMPRESSION: Hot nodule lower pole right gland. Normal uptake. Signing/Reading Doctor: Tiago Adams ([id number])  Transcribed:  on 05/17/2011  Approved: Tiago Adams ([id number])  05/17/2011        Distribution:  Attending Doctor: Sharron Dodge Results (maximum last 3):   Results from [place] encounter on 06/09/22    US THYROID/PARATHYROID/SOFT TISS    Narrative  INDICATION: Nontoxic multinodular goiter    FINDINGS: Routine ultrasound imaging of the thyroid gland was performed. The  right thyroid lobe measures 6.0 x 2.2 x 3.1 cm. The left thyroid lobe measures  5.8 x 2.2 x 2.0 cm. The thyroid isthmus measures 2 mm in thickness. The gland is  diffusely heterogeneous an nodular. 2.1 x 1.6 x 0.9 cm circumscribed solid  nodule at the upper pole of the right lobe previously measured 2.2 x 1.0 x 1.3  cm. 1.7 x 1.2 x 1.3 cm solid nodule in the midportion of the right lobe,  medially, previously measured 1.8 x 1.3 x 1.0 cm. 1.9 x 1.0 x 1.5 cm solid  nodule at the lower pole of the right lobe previously measured 1.9 x 1.5 x 1.0  cm. 1.0 x 1.0 x 0.8 cm mixed solid/cystic nodule at the upper pole the left lobe  previously measured 1.3 x 1.3 x 0.9 cm. 1.7 x 1.6 x 1.4 cm cystic nodule in the  midportion of the left lobe previously measured 1.6 x 0.9 x 0.9 cm. Cine imaging  of the bilateral neck demonstrates no cervical lymphadenopathy. Impression  The purely cystic nodule in the left thyroid lobe is increased in  size since 2018; this is benign in morphology. The solid nodules in both thyroid  lobes demonstrate no significant interval change. No new thyroid nodule is  evident. Results from [place] encounter on 07/13/18    US THYROID/PARATHYROID/SOFT TISS    Narrative  ULTRASOUND OF THE THYROID. INDICATION: Goiter    COMPARISON: 7/7/2017, 1/6/2011    TECHNIQUE: Sonography of the thyroid was performed. FINDINGS:    RIGHT LOBE: measures 15 x 19 x 20 mm. Replaced with next solid and cystic  nodules. Most of these show specular reflectors with ringdown artifact,  consistent with colloid. LEFT LOBE: measures 52 x 18 x 17 mm. Replaced with mixed cystic and solid  nodules and cysts. ISTHMUS: No nodule. PARENCHYMA: Replaced with nodules. No significant change from 7/7/2017. No significant change from 2011, given  differences in equipment. Impression  IMPRESSION:  Multinodular gland.  No nodules with overt malignant features. No significant  change compared to 2011      Results from [place] encounter on 07/07/17    US THYROID/PARATHYROID/SOFT TISS    Narrative  EXAM:  US THYROID/PARATHYROID/SOFT TISS    INDICATION: Thyroid goiter. COMPARISON: Prior exam 6/17/2011. TECHNIQUE: Real-time sonography of the thyroid gland was performed with a high  frequency linear transducer. Multiple static images were obtained. FINDINGS:  There are multiple mildly hypoechoic nodules in the right gland measuring 1.1 x  1.2 x 1.2 cm, 1.4 x 1.8 x 1.3 cm, and 1.4 x 1.7 x 0.8 cm. The largest nodule on  the previous exam measured 0.0 x 1.4 cm. 4 A nodule in the left gland measures 8  x 9 x 9 mm. The thyroid is heterogeneous in echotexture otherwise. The right lobe measures 2.1 x 2.5 x 5.9 cm and the left lobe measures 1.5 x 1.5  x 4.2 cm. The isthmus measures 3 mm. Impression  IMPRESSION: Multinodular goiter with no dominant or otherwise more suspicious  nodule. Pt's ABILIO Score = 2    ABILIO Score Calculation (1 point for each):  - Age >= 72  - Aspirin use in the last 7 days   - At least 2 angina episodes within the last 24hrs  - ST changes of at least 0.5mm on admission EKG  - Elevated serum cardiac biomarkers  - Known Coronary Artery Disease (CAD) (coronary stenosis >= 50%)  - At least 3 risk factors for CAD:  Hypertension -> 140/90 or on antihypertensives, Cigarette smoking, HDL < 40, Diabetes, Family history of premature CAD (CAD in male first-degree relative, or father less than 54, or female first-degree relative or mother less than 72). Score Interpretation:  % risk at 14 days of: all-cause mortality, new or recurrent MI, or severe recurrent ischemia requiring urgent revascularization.   Score of 0-1 = 4.7% risk  Score of 2 = 8.3% risk  Score of 3 = 13.2% risk  Score of 4 = 19.9% risk  Score of 5 = 26.2% risk  Score of 6-7 = at least 40.9% risk    Two or More Episodes of Severe Angina within 24 Hours: No  Elevated Cardiac Markers: Yes  ST Changes > 0.5 mm: No  Aspirin Use in the Past 7 Days: No  Age 65+: Yes  3+ CAD Risk Factors: No  CAD Stenosis >= 50%: No  ABILIO Risk Score (Calculated): 2         Assessment and plan:   # NSTEMI  - Start heparin ggt  - Trend troponin   - SL Nitro for chest pain as needed. - Consult cardiology   - ECHO ordered  - GDMT per cardiology. # Shortness of breath  - Likely secondary to above, no acute findings on chest X-ray  - Supplementary oxygen for now. - If worsening, consider consult pulmonary. # Anemia  -No clear source of bleeding  -Iron studies, reticulocyte count, hemolysis work-up and vitamin levels sent.  -Consult hematologist    #Thrombocytopenia  -Peripheral blood smear since  -Appreciate hematologist input    #Hypertension  - Continue home medications. # HLP  - Taking pitavastatin at home. Got cramps SE from atorvastatin. - We will start rosuvastatin.      # Full code by default, need further clarification    # Medication list reviewed on Epic and with patient/family        Signed By: Shayne Ridley MD     July 27, 2022

## 2022-07-27 NOTE — CONSULTS
Consult    Subjective:     Urban Side is a 67 y.o. female seen for ho is being seen for  thrombocytopenia and anemia. Pt admitted with SOB and dizziness. Pt also with some leg swelling. Pt troponin mildly elevated. Pt seen by cardiology. Cbc at admission showed HB 8.9 gms/dl,platelets 92P,EEI 8.7U..MP feeling tired. Denies any recent COVID infection. Denies bleeding. Pt on IV heparin. Past Medical History:   Diagnosis Date    Hypertension       History reviewed. No pertinent surgical history. History reviewed. No pertinent family history.    Social History     Tobacco Use    Smoking status: Never    Smokeless tobacco: Never   Substance Use Topics    Alcohol use: Never       Current Facility-Administered Medications   Medication Dose Route Frequency Provider Last Rate Last Admin    rosuvastatin (CRESTOR) tablet 20 mg  20 mg Oral QHS Saravanan Jones MD        heparin 25,000 units in D5W 250 ml infusion  12-25 Units/kg/hr (Adjusted) IntraVENous TITRATE Mily Hansen MD 11.5 mL/hr at 07/27/22 1319 16 Units/kg/hr at 07/27/22 1319    heparin (porcine) 1,000 unit/mL injection 4,000 Units  4,000 Units IntraVENous PRN Mily Hansen MD        Or    heparin (porcine) 1,000 unit/mL injection 2,000 Units  2,000 Units IntraVENous PRN Mily Hansen MD   2,000 Units at 07/27/22 0547    sodium chloride (NS) flush 5-40 mL  5-40 mL IntraVENous Q8H Cha, Carle Kayser, MD        sodium chloride (NS) flush 5-40 mL  5-40 mL IntraVENous PRN Mily Hansen MD        acetaminophen (TYLENOL) tablet 650 mg  650 mg Oral Q6H PRN Mily Hansen MD        Or    acetaminophen (TYLENOL) suppository 650 mg  650 mg Rectal Q6H PRN Cha, Carle Kayser, MD        polyethylene glycol (MIRALAX) packet 17 g  17 g Oral DAILY PRN Mily Hansen MD        ondansetron (ZOFRAN ODT) tablet 4 mg  4 mg Oral Q8H PRN Mily Hansen MD        Or    ondansetron (ZOFRAN) injection 4 mg  4 mg IntraVENous Q6H PRN Mily Hansen MD        nitroglycerin (NITROSTAT) tablet 0.4 mg 0.4 mg SubLINGual PRN Kenna Jones MD        metoprolol succinate (TOPROL-XL) XL tablet 25 mg  25 mg Oral DAILY Kenna Jones MD   25 mg at 07/27/22 1107    losartan (COZAAR) tablet 25 mg  25 mg Oral DAILY Kenna Jones MD   25 mg at 07/27/22 1107     Current Outpatient Medications   Medication Sig Dispense Refill    metoprolol succinate (TOPROL-XL) 25 mg XL tablet Take 25 mg by mouth in the morning. losartan (COZAAR) 25 mg tablet Take 25 mg by mouth in the morning. pitavastatin calcium (LIVALO) 4 mg tab tablet Take 4 mg by mouth every other day. icosapent ethyL (VASCEPA) 1 gram capsule Take 2 Capsules by mouth two (2) times daily (with meals). acetaminophen (TYLENOL) 500 mg capsule Take 500 mg by mouth as needed for Pain. Allergies   Allergen Reactions    Codeine Other (comments)     Gets real dizzy and nauseated. Nsaids (Non-Steroidal Anti-Inflammatory Drug) Other (comments)     Instant h/a and stomach burning. Sulfa (Sulfonamide Antibiotics) Hives     Rash that looks like measles. Review of Systems:    Other than mentioned in HPI 12 point review of systems negative  Objective: Intake and Output:    No intake/output data recorded. No intake/output data recorded. Blood pressure 105/74, pulse 92, temperature 98.7 °F (37.1 °C), resp. rate 30, height 5' 6.14\" (1.68 m), weight 90.7 kg (199 lb 15.3 oz), SpO2 95 %. Physical Exam:   General:  Alert, cooperative, no distress, appears stated age. Lungs:   Clear to auscultation bilaterally. Chest wall:  No tenderness or deformity. Heart:  Regular rate and rhythm, S1, S2 normal, no murmur, click, rub or gallop. Abdomen:   Soft, non-tender. Bowel sounds normal. No masses,  No organomegaly. Extremities: Extremities normal, atraumatic, no cyanosis . 1+ edema of legs. Pulses: 2+ and symmetric all extremities. Skin: Skin color, texture, turgor normal. No rashes or lesions   Neurologic: CNII-XII intact.  No gross sensory or motor deficits       Images:   CT HEAD WO CONT   Final Result   No acute intracranial process. XR CHEST PORT   Final Result   No acute process. CTA CHEST W OR W WO CONT    (Results Pending)            Data Review:   Recent Results (from the past 24 hour(s))   EKG, 12 LEAD, INITIAL    Collection Time: 07/26/22  6:14 PM   Result Value Ref Range    Ventricular Rate 104 BPM    Atrial Rate 104 BPM    P-R Interval 122 ms    QRS Duration 82 ms    Q-T Interval 388 ms    QTC Calculation (Bezet) 510 ms    Calculated P Axis 27 degrees    Calculated R Axis -3 degrees    Calculated T Axis -56 degrees    Diagnosis       Sinus tachycardia  Possible Left atrial enlargement  Left ventricular hypertrophy  T wave abnormality, consider inferior ischemia  T wave abnormality, consider anterolateral ischemia  Abnormal ECG  No previous ECGs available  Confirmed by KEYONNA HALE, Beni Drew ([id number]) on 7/27/2022 7:27:08 AM     CBC WITH AUTOMATED DIFF    Collection Time: 07/26/22  7:00 PM   Result Value Ref Range    WBC 7.0 3.6 - 11.0 K/uL    RBC 3.07 (L) 3.80 - 5.20 M/uL    HGB 8.9 (L) 11.5 - 16.0 g/dL    HCT 27.5 (L) 35.0 - 47.0 %    MCV 89.6 80.0 - 99.0 FL    MCH 29.0 26.0 - 34.0 PG    MCHC 32.4 30.0 - 36.5 g/dL    RDW 14.4 11.5 - 14.5 %    PLATELET 97 (L) 998 - 400 K/uL    MPV 12.3 8.9 - 12.9 FL    NRBC 0.0 0.0  WBC    ABSOLUTE NRBC 0.00 0.00 - 0.01 K/uL    NEUTROPHILS 78 (H) 32 - 75 %    LYMPHOCYTES 17 12 - 49 %    MONOCYTES 5 5 - 13 %    EOSINOPHILS 0 0 - 7 %    BASOPHILS 0 0 - 1 %    IMMATURE GRANULOCYTES 0 0 - 0.5 %    ABS. NEUTROPHILS 5.4 1.8 - 8.0 K/UL    ABS. LYMPHOCYTES 1.2 0.8 - 3.5 K/UL    ABS. MONOCYTES 0.4 0.0 - 1.0 K/UL    ABS. EOSINOPHILS 0.0 0.0 - 0.4 K/UL    ABS. BASOPHILS 0.0 0.0 - 0.1 K/UL    ABS. IMM.  GRANS. 0.0 0.00 - 0.04 K/UL    DF AUTOMATED     METABOLIC PANEL, COMPREHENSIVE    Collection Time: 07/26/22  7:00 PM   Result Value Ref Range    Sodium 142 136 - 145 mmol/L    Potassium 3.8 3.5 - 5.1 mmol/L    Chloride 112 (H) 97 - 108 mmol/L    CO2 21 21 - 32 mmol/L    Anion gap 9 5 - 15 mmol/L    Glucose 110 (H) 65 - 100 mg/dL    BUN 16 6 - 20 mg/dL    Creatinine 0.75 0.55 - 1.02 mg/dL    BUN/Creatinine ratio 21 (H) 12 - 20      GFR est AA >60 >60 ml/min/1.73m2    GFR est non-AA >60 >60 ml/min/1.73m2    Calcium 8.2 (L) 8.5 - 10.1 mg/dL    Bilirubin, total 1.0 0.2 - 1.0 mg/dL    AST (SGOT) 109 (H) 15 - 37 U/L    ALT (SGPT) 110 (H) 12 - 78 U/L    Alk.  phosphatase 90 45 - 117 U/L    Protein, total 6.7 6.4 - 8.2 g/dL    Albumin 3.0 (L) 3.5 - 5.0 g/dL    Globulin 3.7 2.0 - 4.0 g/dL    A-G Ratio 0.8 (L) 1.1 - 2.2     TROPONIN-HIGH SENSITIVITY    Collection Time: 07/26/22  7:00 PM   Result Value Ref Range    Troponin-High Sensitivity 341 (HH) 0 - 51 ng/L   MAGNESIUM    Collection Time: 07/26/22  7:00 PM   Result Value Ref Range    Magnesium 1.9 1.6 - 2.4 mg/dL   PTT    Collection Time: 07/26/22  9:35 PM   Result Value Ref Range    aPTT 20.6 (L) 21.2 - 34.1 sec    aPTT, therapeutic range   82 - 109 sec   TROPONIN-HIGH SENSITIVITY    Collection Time: 07/27/22 12:32 AM   Result Value Ref Range    Troponin-High Sensitivity 386 (HH) 0 - 51 ng/L   TROPONIN-HIGH SENSITIVITY    Collection Time: 07/27/22  4:23 AM   Result Value Ref Range    Troponin-High Sensitivity 280 (HH) 0 - 51 ng/L   METABOLIC PANEL, BASIC    Collection Time: 07/27/22  4:23 AM   Result Value Ref Range    Sodium 141 136 - 145 mmol/L    Potassium 3.4 (L) 3.5 - 5.1 mmol/L    Chloride 110 (H) 97 - 108 mmol/L    CO2 23 21 - 32 mmol/L    Anion gap 8 5 - 15 mmol/L    Glucose 136 (H) 65 - 100 mg/dL    BUN 26 (H) 6 - 20 mg/dL    Creatinine 0.92 0.55 - 1.02 mg/dL    BUN/Creatinine ratio 28 (H) 12 - 20      GFR est AA >60 >60 ml/min/1.73m2    GFR est non-AA >60 >60 ml/min/1.73m2    Calcium 8.9 8.5 - 10.1 mg/dL   CBC W/O DIFF    Collection Time: 07/27/22  4:23 AM   Result Value Ref Range    WBC 9.7 3.6 - 11.0 K/uL    RBC 4.05 3.80 - 5.20 M/uL    HGB 11.9 11.5 - 16.0 g/dL    HCT 35.5 35.0 - 47.0 %    MCV 87.7 80.0 - 99.0 FL    MCH 29.4 26.0 - 34.0 PG    MCHC 33.5 30.0 - 36.5 g/dL    RDW 14.4 11.5 - 14.5 %    PLATELET 384 (L) 627 - 400 K/uL    MPV 11.8 8.9 - 12.9 FL    NRBC 0.0 0.0  WBC    ABSOLUTE NRBC 0.00 0.00 - 0.01 K/uL   PTT    Collection Time: 07/27/22  4:23 AM   Result Value Ref Range    aPTT 56.8 (H) 21.2 - 34.1 sec    aPTT, therapeutic range   82 - 109 sec   FERRITIN    Collection Time: 07/27/22  4:23 AM   Result Value Ref Range    Ferritin 280 (H) 8 - 252 ng/mL   IRON PROFILE    Collection Time: 07/27/22  4:23 AM   Result Value Ref Range    Iron 76 35 - 150 ug/dL    TIBC 284 250 - 450 ug/dL    Iron % saturation 27 20 - 50 %   HAPTOGLOBIN    Collection Time: 07/27/22  4:23 AM   Result Value Ref Range    Haptoglobin 175 30 - 200 mg/dL   VITAMIN B12 & FOLATE    Collection Time: 07/27/22  4:23 AM   Result Value Ref Range    Vitamin B12 337 193 - 986 pg/mL    Folate 17.0 5.0 - 21.0 ng/mL   RETICULOCYTE COUNT    Collection Time: 07/27/22  4:23 AM   Result Value Ref Range    Reticulocyte count 2.3 (H) 0.7 - 2.1 %    Absolute Retic Cnt. 0.0952 (H) 0.0164 - 0.0776 M/ul   LD    Collection Time: 07/27/22  4:23 AM   Result Value Ref Range     (H) 81 - 246 U/L   ECHO ADULT COMPLETE    Collection Time: 07/27/22  9:00 AM   Result Value Ref Range    AV Peak Velocity 1.1 m/s    AV Peak Gradient 4 mmHg    Aortic Root 3.1 cm    IVSd 1.5 (A) 0.6 - 0.9 cm    LVIDd 2.6 (A) 3.9 - 5.3 cm    LVIDs 1.7 cm    LVOT Peak Velocity 1.0 m/s    LVOT Peak Gradient 4 mmHg    LVPWd 1.5 (A) 0.6 - 0.9 cm    LV E' Lateral Velocity 6 cm/s    LV E' Septal Velocity 5 cm/s    LA Diameter 3.0 cm    MV E Wave Deceleration Time 169.0 ms    MV A Velocity 0.70 m/s    MV E Velocity 0.41 m/s    Est. RA Pressure 15 mmHg    RVIDd 4.1 cm    TAPSE 1.8 1.7 cm    TR Max Velocity 3.21 m/s    TR Peak Gradient 41 mmHg    Fractional Shortening 2D 35 28 - 44 %    LVIDd Index 1.30 cm/m2    LVIDs Index 0.85 cm/m2 LV RWT Ratio 1.15     LV Mass 2D 132.1 67 - 162 g    LV Mass 2D Index 66.0 43 - 95 g/m2    MV E/A 0.59     E/E' Ratio (Averaged) 7.52     E/E' Lateral 6.83     E/E' Septal 8.20     LA Size Index 1.50 cm/m2    LA/AO Root Ratio 0.97     Ao Root Index 1.55 cm/m2    AV Velocity Ratio 0.91     RVSP 56 mmHg    EF BP 68 55 - 100 %   PTT    Collection Time: 07/27/22 12:12 PM   Result Value Ref Range    aPTT 73.1 (H) 21.2 - 34.1 sec    aPTT, therapeutic range   82 - 109 sec   D DIMER    Collection Time: 07/27/22 12:12 PM   Result Value Ref Range    D DIMER 13.42 (H) <0.50 ug/ml(FEU)        Assessment/plan:     Anemia:Pt repeat HB normal.Initial reading may be ? Lab error. Monitor. Iron studies. B12,folate nl. Thrombocytopenia:Better. Mild. Check peripheral smear. Monitor. B12,folate nl. Check TSH,retic count,LDH. Elevation of troponin:mild. cardiology following. Pt on IV heparin. BL Leg swelling:check venous doppler of BLE. SOB with exertion:R/o PE. Check CTA chest.  D/w pt. Thank you for the consult.

## 2022-07-27 NOTE — PROGRESS NOTES
[facility] Hospitalist Progress Note  Nghia Marsh MD    Date:2022       Room:[place] [address]  Patient Name:Leann Hayden     YOB: 1950     Age:69 y.o.    22 admission course  Jeana Coello is a 67 y.o. female with PMH of hypertension, HLP presented to the ED with a chief complaint of shortness of breath started yesterday. Worsening with mild exertion, associated with dizziness and left-sided chest pressure. Chest pressure is rated 5/10. Also has LE swelling, but attributes to previous knee surgery. Otherwise denies productive cough, wheezing, fever or chills. In ED, placed on 2 L nasal cannula. Troponin elevated at 300s. EKG showed no ST changes. Hemoglobin 8.9, plt 97, no baseline to compare. 22 no new complaints, tolerating medications well  Tolerating heparin drip well  Case discussed at bedside with her cardiologist Dr Tatiana Rosenthal by telephone  Dr Tatiana Rosenthal prefers [facility] cardiology to follow his patients while at [facility]  Cardiac cath anticipated for NSTEMI    Subjective    Subjective:  Symptoms:  Stable. Review of Systems   All other systems reviewed and are negative. Objective         Vitals Last 24 Hours:  TEMPERATURE:  Temp  Av.9 °F (36.6 °C)  Min: 97.7 °F (36.5 °C)  Max: 98.1 °F (36.7 °C)  RESPIRATIONS RANGE: Resp  Av.9  Min: 19  Max: 34  PULSE OXIMETRY RANGE: SpO2  Av %  Min: 92 %  Max: 98 %  PULSE RANGE: Pulse  Av.5  Min: 93  Max: 106  BLOOD PRESSURE RANGE: Systolic (36RUC), MKR:041 , Min:115 , RYL:175   ; Diastolic (66LSH), YYE:38, Min:68, Max:86    I/O (24Hr): No intake or output data in the 24 hours ending 22 0751  Objective:  General Appearance:  Comfortable. Vital signs: (most recent): Blood pressure 115/81, pulse 92, temperature 97.6 °F (36.4 °C), resp. rate 30, height 5' 6\" (1.676 m), weight 90.7 kg (200 lb), SpO2 96 %. HEENT: Normal HEENT exam.    Lungs:  Normal effort. Heart: Normal rate. Regular rhythm.     Abdomen: Abdomen is soft. Hyperactive bowel sounds. Skin:  Warm and dry. Labs/Imaging/Diagnostics    Labs:  CBC:  Recent Labs     07/27/22 0423 07/26/22 1900   WBC 9.7 7.0   RBC 4.05 3.07*   HGB 11.9 8.9*   HCT 35.5 27.5*   MCV 87.7 89.6   RDW 14.4 14.4   * 97*     CHEMISTRIES:  Recent Labs     07/27/22 0423 07/26/22 1900    142   K 3.4* 3.8   * 112*   CO2 23 21   BUN 26* 16   CA 8.9 8.2*   MG  --  1.9   PT/INR:No results for input(s): INR, INREXT in the last 72 hours. No lab exists for component: PROTIME  APTT:  Recent Labs     07/27/22 0423 07/26/22 2135   APTT 56.8* 20.6*     LIVER PROFILE:  Recent Labs     07/26/22 1900   *   *     Lab Results   Component Value Date/Time    ALT (SGPT) 110 (H) 07/26/2022 07:00 PM    AST (SGOT) 109 (H) 07/26/2022 07:00 PM    Alk. phosphatase 90 07/26/2022 07:00 PM    Bilirubin, total 1.0 07/26/2022 07:00 PM       Imaging Last 24 Hours:  CT HEAD WO CONT    Result Date: 7/26/2022  CLINICAL HISTORY: Dizziness/unsteadiness INDICATION: Dizziness/unsteadiness COMPARISON: None. CT dose reduction was achieved through use of a standardized protocol tailored for this examination and automatic exposure control for dose modulation. TECHNIQUE: Serial axial images with a collimation of 5 mm were obtained from the skull base through the vertex  FINDINGS: The sulci and ventricles are within normal limits for patient age. There is no evidence of an acute infarction, hemorrhage, or mass-effect. There is no evidence of midline shift or hydrocephalus. Posterior fossa structures are unremarkable. No extra-axial collections are seen. Mastoid air cells are well pneumatized and clear. There is no evidence of depressed skull fractures of soft tissue swelling. No acute intracranial process. XR CHEST PORT    Result Date: 7/26/2022  Indication: Chest pain Comparison: None [facility] [id number] [facility] normal [facility] size.  There is no acute process in the lung fields. The osseous structures are unremarkable. No acute process. Assessment//Plan   Active Problems:    NSTEMI (non-ST elevated myocardial infarction) ([facility] [address] (7/26/2022)      Assessment & Plan  7/26/22 admission course  Cole Pino is a 67 y.o. female with PMH of hypertension, HLP presented to the ED with a chief complaint of shortness of breath started yesterday. Worsening with mild exertion, associated with dizziness and left-sided chest pressure. Chest pressure is rated 5/10. Also has LE swelling, but attributes to previous knee surgery. Otherwise denies productive cough, wheezing, fever or chills. In ED, placed on 2 L nasal cannula. Troponin elevated at 300s. EKG showed no ST changes. Hemoglobin 8.9, plt 97, no baseline to compare. 7/27/22 no new complaints, tolerating medications well  Tolerating heparin drip well  Case discussed at bedside with her cardiologist Dr Laureano Rome by telephone  Dr Laureano Rome prefers [facility] cardiology to follow his patients while at [facility]  Cardiac cath anticipated for NSTEMI    ASSESSMENT AND PLAN    # NSTEMI  - Start heparin ggt  - Trend troponin  - SL Nitro for chest pain as needed. - Consult cardiology  - ECHO ordered  - GDMT per cardiology. # Shortness of breath  - Likely secondary to above, no acute findings on chest X-ray  - Supplementary oxygen for now. - If worsening, consider consult pulmonary. # Anemia  -No clear source of bleeding  -Iron studies, reticulocyte count, hemolysis work-up and vitamin levels sent.  -Consult hematologist     #Thrombocytopenia  -Peripheral blood smear since  -Appreciate hematologist input     #Hypertension  - Continue home medications. # HLP  - Taking pitavastatin at home. Got cramps SE from atorvastatin. - We will start rosuvastatin.      # Full code  Electronically signed by Coreen Loco MD on 7/27/2022 at 7:51 AM

## 2022-07-27 NOTE — ACP (ADVANCE CARE PLANNING)
Advance Care Planning   Healthcare Decision Maker:       Primary Decision Maker: Sandee Govea - [facility] [place] - [phone]

## 2022-07-27 NOTE — ED NOTES
Pt ambulated to bathroom and back. Pulse ox down to 88%. Placed back on oxygen sates up in the 90's after. Pt placed on hospital bed for comfort.

## 2022-07-27 NOTE — CONSULTS
Consult    NAME: Alicia Kirk   :  1950   MRN:  [id number]     Date/Time:  2022 10:17 AM    Patient PCP: Lucrecia Kenney DO  ________________________________________________________________________      Subjective:   CHIEF COMPLAINT: Shortness of breath. HISTORY OF PRESENT ILLNESS:   Patient stated that she is feeling short of breath for the last few days and has also experience some dizziness. She thought she had a vertigo symptoms and that she used some patch behind. She stated that in early part of the July she had traveled to [facility] and [facility] by car. She has noticed some leg swellings but this is a all the way going back to the . She did experience some vague chest pain which would last for seconds to minutes and is in upper chest on the right side. Past Medical History:   Diagnosis Date    Hypertension       History reviewed. No pertinent surgical history. Allergies   Allergen Reactions    Codeine Other (comments)     Gets real dizzy and nauseated. Nsaids (Non-Steroidal Anti-Inflammatory Drug) Other (comments)     Instant h/a and stomach burning. Sulfa (Sulfonamide Antibiotics) Hives     Rash that looks like measles. Meds:  See below  Social History     Tobacco Use    Smoking status: Never    Smokeless tobacco: Never   Substance Use Topics    Alcohol use: Never   Negative for smoking drinking or drugs. History reviewed. No pertinent family history. FAMILY HISTORY: Mother is 80years old and has a history of stroke and dementia and father  at the age of 80. Stroke and probably prostate cancer with metastasis. Brother  at the age of 52 from heart attack. PERSONAL HISTORY : Patient is  has 3 children and she is retired she did work as a professor.       REVIEW OF SYSTEMS:     []         Unable to obtain  ROS due to ---   [x]         Total of 12 systems reviewed as follows:    Constitutional: negative fever, negative chills, negative weight loss  Eyes:   negative visual changes  ENT:   negative sore throat, tongue or lip swelling  Respiratory:  negative cough, significant dyspnea  Cards:  negative for chest pain, palpitations, positive for lower extremity edema  GI:   negative for nausea, vomiting, diarrhea, and abdominal pain  Genitourinary: negative for frequency, dysuria  Integument:  negative for rash   Hematologic:  negative for easy bruising and gum/nose bleeding  Musculoskel: negative for myalgias,  back pain  Neurological:  negative for headaches, dizziness, vertigo, weakness  Behavl/Psych: negative for feelings of anxiety, depression     Pertinent Positives include :    Objective:      Physical Exam:    Last 24hrs VS reviewed since prior progress note. Most recent are:    Visit Vitals  /81   Pulse 92   Temp 97.6 °F (36.4 °C)   Resp 30   Ht 5' 6.14\" (1.68 m)   Wt 90.7 kg (199 lb 15.3 oz)   SpO2 96%   BMI 32.14 kg/m²     No intake or output data in the 24 hours ending 07/27/22 1017     General Appearance: Well developed, well nourished, alert & oriented x 3,    no acute distress. Ears/Nose/Mouth/Throat: Pupils equal and round, Hearing grossly normal.  Neck: Supple. JVP within normal limits. Carotids good upstrokes, with no bruit. Chest: Lungs clear to auscultation bilaterally. Cardiovascular: Regular rate and rhythm, S1S2 normal, no murmur, rubs, gallops. Abdomen: Soft, non-tender, bowel sounds are active. No organomegaly. Extremities: Trace edema of the left lower extremity. Femoral pulses +2, Distal Pulses +1. Skin: Warm and dry. Neuro: CN II-XII grossly intact, Strength and sensation grossly intact. []         Post-cath site without hematoma, bruit, tenderness, or thrill. Distal pulses intact. Data:      Telemetry: Sinus rhythm    EKG:  []  No new EKG for review.         Prior to Admission medications    Not on File       Recent Results (from the past 24 hour(s))   EKG, 12 LEAD, INITIAL Collection Time: 07/26/22  6:14 PM   Result Value Ref Range    Ventricular Rate 104 BPM    Atrial Rate 104 BPM    P-R Interval 122 ms    QRS Duration 82 ms    Q-T Interval 388 ms    QTC Calculation (Bezet) 510 ms    Calculated P Axis 27 degrees    Calculated R Axis -3 degrees    Calculated T Axis -56 degrees    Diagnosis       Sinus tachycardia  Possible Left atrial enlargement  Left ventricular hypertrophy  T wave abnormality, consider inferior ischemia  T wave abnormality, consider anterolateral ischemia  Abnormal ECG  No previous ECGs available  Confirmed by KEYONNA HALE, Lucero Maza ([id number]) on 7/27/2022 7:27:08 AM     CBC WITH AUTOMATED DIFF    Collection Time: 07/26/22  7:00 PM   Result Value Ref Range    WBC 7.0 3.6 - 11.0 K/uL    RBC 3.07 (L) 3.80 - 5.20 M/uL    HGB 8.9 (L) 11.5 - 16.0 g/dL    HCT 27.5 (L) 35.0 - 47.0 %    MCV 89.6 80.0 - 99.0 FL    MCH 29.0 26.0 - 34.0 PG    MCHC 32.4 30.0 - 36.5 g/dL    RDW 14.4 11.5 - 14.5 %    PLATELET 97 (L) 466 - 400 K/uL    MPV 12.3 8.9 - 12.9 FL    NRBC 0.0 0.0  WBC    ABSOLUTE NRBC 0.00 0.00 - 0.01 K/uL    NEUTROPHILS 78 (H) 32 - 75 %    LYMPHOCYTES 17 12 - 49 %    MONOCYTES 5 5 - 13 %    EOSINOPHILS 0 0 - 7 %    BASOPHILS 0 0 - 1 %    IMMATURE GRANULOCYTES 0 0 - 0.5 %    ABS. NEUTROPHILS 5.4 1.8 - 8.0 K/UL    ABS. LYMPHOCYTES 1.2 0.8 - 3.5 K/UL    ABS. MONOCYTES 0.4 0.0 - 1.0 K/UL    ABS. EOSINOPHILS 0.0 0.0 - 0.4 K/UL    ABS. BASOPHILS 0.0 0.0 - 0.1 K/UL    ABS. IMM.  GRANS. 0.0 0.00 - 0.04 K/UL    DF AUTOMATED     METABOLIC PANEL, COMPREHENSIVE    Collection Time: 07/26/22  7:00 PM   Result Value Ref Range    Sodium 142 136 - 145 mmol/L    Potassium 3.8 3.5 - 5.1 mmol/L    Chloride 112 (H) 97 - 108 mmol/L    CO2 21 21 - 32 mmol/L    Anion gap 9 5 - 15 mmol/L    Glucose 110 (H) 65 - 100 mg/dL    BUN 16 6 - 20 mg/dL    Creatinine 0.75 0.55 - 1.02 mg/dL    BUN/Creatinine ratio 21 (H) 12 - 20      GFR est AA >60 >60 ml/min/1.73m2    GFR est non-AA >60 >60 ml/min/1.73m2 Calcium 8.2 (L) 8.5 - 10.1 mg/dL    Bilirubin, total 1.0 0.2 - 1.0 mg/dL    AST (SGOT) 109 (H) 15 - 37 U/L    ALT (SGPT) 110 (H) 12 - 78 U/L    Alk.  phosphatase 90 45 - 117 U/L    Protein, total 6.7 6.4 - 8.2 g/dL    Albumin 3.0 (L) 3.5 - 5.0 g/dL    Globulin 3.7 2.0 - 4.0 g/dL    A-G Ratio 0.8 (L) 1.1 - 2.2     TROPONIN-HIGH SENSITIVITY    Collection Time: 07/26/22  7:00 PM   Result Value Ref Range    Troponin-High Sensitivity 341 (HH) 0 - 51 ng/L   MAGNESIUM    Collection Time: 07/26/22  7:00 PM   Result Value Ref Range    Magnesium 1.9 1.6 - 2.4 mg/dL   PTT    Collection Time: 07/26/22  9:35 PM   Result Value Ref Range    aPTT 20.6 (L) 21.2 - 34.1 sec    aPTT, therapeutic range   82 - 109 sec   TROPONIN-HIGH SENSITIVITY    Collection Time: 07/27/22 12:32 AM   Result Value Ref Range    Troponin-High Sensitivity 386 (HH) 0 - 51 ng/L   TROPONIN-HIGH SENSITIVITY    Collection Time: 07/27/22  4:23 AM   Result Value Ref Range    Troponin-High Sensitivity 280 (HH) 0 - 51 ng/L   METABOLIC PANEL, BASIC    Collection Time: 07/27/22  4:23 AM   Result Value Ref Range    Sodium 141 136 - 145 mmol/L    Potassium 3.4 (L) 3.5 - 5.1 mmol/L    Chloride 110 (H) 97 - 108 mmol/L    CO2 23 21 - 32 mmol/L    Anion gap 8 5 - 15 mmol/L    Glucose 136 (H) 65 - 100 mg/dL    BUN 26 (H) 6 - 20 mg/dL    Creatinine 0.92 0.55 - 1.02 mg/dL    BUN/Creatinine ratio 28 (H) 12 - 20      GFR est AA >60 >60 ml/min/1.73m2    GFR est non-AA >60 >60 ml/min/1.73m2    Calcium 8.9 8.5 - 10.1 mg/dL   CBC W/O DIFF    Collection Time: 07/27/22  4:23 AM   Result Value Ref Range    WBC 9.7 3.6 - 11.0 K/uL    RBC 4.05 3.80 - 5.20 M/uL    HGB 11.9 11.5 - 16.0 g/dL    HCT 35.5 35.0 - 47.0 %    MCV 87.7 80.0 - 99.0 FL    MCH 29.4 26.0 - 34.0 PG    MCHC 33.5 30.0 - 36.5 g/dL    RDW 14.4 11.5 - 14.5 %    PLATELET 282 (L) 546 - 400 K/uL    MPV 11.8 8.9 - 12.9 FL    NRBC 0.0 0.0  WBC    ABSOLUTE NRBC 0.00 0.00 - 0.01 K/uL   PTT    Collection Time: 07/27/22  4:23 AM   Result Value Ref Range    aPTT 56.8 (H) 21.2 - 34.1 sec    aPTT, therapeutic range   82 - 109 sec   RETICULOCYTE COUNT    Collection Time: 07/27/22  4:23 AM   Result Value Ref Range    Reticulocyte count 2.3 (H) 0.7 - 2.1 %    Absolute Retic Cnt. 0.0952 (H) 0.0164 - 0.0776 M/ul   LD    Collection Time: 07/27/22  4:23 AM   Result Value Ref Range     (H) 81 - 246 U/L   ECHO ADULT COMPLETE    Collection Time: 07/27/22  9:00 AM   Result Value Ref Range    AV Peak Velocity 1.1 m/s    AV Peak Gradient 4 mmHg    Aortic Root 3.1 cm    IVSd 1.5 (A) 0.6 - 0.9 cm    LVIDd 2.6 (A) 3.9 - 5.3 cm    LVIDs 1.7 cm    LVOT Peak Velocity 1.0 m/s    LVOT Peak Gradient 4 mmHg    LVPWd 1.5 (A) 0.6 - 0.9 cm    LV E' Lateral Velocity 6 cm/s    LV E' Septal Velocity 5 cm/s    LA Diameter 3.0 cm    MV E Wave Deceleration Time 169.0 ms    MV A Velocity 0.70 m/s    MV E Velocity 0.41 m/s    Est. RA Pressure 15 mmHg    RVIDd 4.1 cm    TAPSE 1.8 1.7 cm    TR Max Velocity 3.21 m/s    TR Peak Gradient 41 mmHg    Fractional Shortening 2D 35 28 - 44 %    LVIDd Index 1.30 cm/m2    LVIDs Index 0.85 cm/m2    LV RWT Ratio 1.15     LV Mass 2D 132.1 67 - 162 g    LV Mass 2D Index 66.0 43 - 95 g/m2    MV E/A 0.59     E/E' Ratio (Averaged) 7.52     E/E' Lateral 6.83     E/E' Septal 8.20     LA Size Index 1.50 cm/m2    LA/AO Root Ratio 0.97     Ao Root Index 1.55 cm/m2    AV Velocity Ratio 0.91     RVSP 56 mmHg    EF BP 68 55 - 100 %         Assessment:   She has symptoms of shortness of breath about couple days, etiology is unclear. Troponin I is minimally elevated. Hypertension. Echocardiogram reveals right ventricle is mildly dilated and mildly hypokinetic and there is a moderate tricuspid regurgitation with pulmonary pressures about 47 mmHg. Her presentation is not very impressive for that of acute coronary syndrome.           Plan:   I would recommend to have a D-dimer and consider evaluation for pulmonary embolism or exclude pulmonary embolism. We will check serial EKG and enzymes. Probably reasonable to consider IV heparin and aspirin. Thank you.         []        High complexity decision making was performed    Shashank Bueno MD

## 2022-07-27 NOTE — ED NOTES
TRANSFER - OUT REPORT:    Verbal report given to Marysol(name) on Reid Pena  being transferred to [facility](unit) for routine progression of care       Report consisted of patients Situation, Background, Assessment and   Recommendations(SBAR). Information from the following report(s) SBAR was reviewed with the receiving nurse. Lines:   Peripheral IV 07/26/22 Left Antecubital (Active)       Peripheral IV 07/27/22 Right Antecubital (Active)        Opportunity for questions and clarification was provided.       Patient transported with:   Myrio

## 2022-07-27 NOTE — ED NOTES
Assumed care of patient and received report from [facility]. Pt resting comfortably.

## 2022-07-27 NOTE — PROGRESS NOTES
Admission Medication Reconciliation:    Information obtained from:  Patient    Comments/Recommendations: Reviewed PTA medications and patient's allergies. Allergies:  Codeine, Nsaids (non-steroidal anti-inflammatory drug), and Sulfa (sulfonamide antibiotics)    Significant PMH/Disease States:   Past Medical History:   Diagnosis Date    Hypertension      Chief Complaint for this Admission:    Chief Complaint   Patient presents with    Chest Pain    Dizziness    Shortness of Breath     Prior to Admission Medications:   Prior to Admission Medications   Prescriptions Last Dose Informant Patient Reported? Taking?   acetaminophen (TYLENOL) 500 mg capsule  Self Yes Yes   Sig: Take 500 mg by mouth as needed for Pain.   icosapent ethyL (VASCEPA) 1 gram capsule  Self Yes Yes   Sig: Take 2 Capsules by mouth two (2) times daily (with meals). losartan (COZAAR) 25 mg tablet  Self Yes Yes   Sig: Take 25 mg by mouth in the morning. metoprolol succinate (TOPROL-XL) 25 mg XL tablet  Self Yes Yes   Sig: Take 25 mg by mouth in the morning. pitavastatin calcium (LIVALO) 4 mg tab tablet  Self Yes Yes   Sig: Take 4 mg by mouth every other day.       Facility-Administered Medications: None       Lilliana Bernard

## 2022-07-27 NOTE — PROGRESS NOTES
Reason for Admission:  NSTEMI                     RUR Score:   8%                  Plan for utilizing home health:   None @ this time/uses no DME. PCP: First and Last name:  Steve Thrasher DO     Name of Practice:    Are you a current patient: Yes/No: Yes   Approximate date of last visit: 5/26/22. Can you participate in a virtual visit with your PCP: Yes/Call/Has cell phone. Current Advanced Directive/Advance Care Plan: Full Code      Healthcare Decision Maker:     Primary Decision Maker: Amor Hayden - [facility] - [phone]                  Transition of Care Plan:   D/C Plan is home with  & brother to transport home. Call Rxs into [employer] on [employer].

## 2022-07-28 ENCOUNTER — APPOINTMENT (OUTPATIENT)
Dept: INTERVENTIONAL RADIOLOGY/VASCULAR | Age: 72
DRG: 163 | End: 2022-07-28
Attending: INTERNAL MEDICINE
Payer: MEDICARE

## 2022-07-28 ENCOUNTER — APPOINTMENT (OUTPATIENT)
Dept: NON INVASIVE DIAGNOSTICS | Age: 72
DRG: 163 | End: 2022-07-28
Attending: INTERNAL MEDICINE
Payer: MEDICARE

## 2022-07-28 LAB
ACT BLD: 265 SEC (ref 74–125)
ANION GAP SERPL CALC-SCNC: 7 MMOL/L (ref 5–15)
APTT PPP: 39.4 SEC (ref 21.2–34.1)
APTT PPP: 46.6 SEC (ref 21.2–34.1)
APTT PPP: >153 SEC (ref 21.2–34.1)
ATRIAL RATE: 80 BPM
BASOPHILS # BLD: 0.1 K/UL (ref 0–0.1)
BASOPHILS NFR BLD: 1 % (ref 0–1)
BUN SERPL-MCNC: 31 MG/DL (ref 6–20)
BUN/CREAT SERPL: 32 (ref 12–20)
CA-I BLD-MCNC: 8.9 MG/DL (ref 8.5–10.1)
CALCULATED P AXIS, ECG09: 34 DEGREES
CALCULATED R AXIS, ECG10: 8 DEGREES
CALCULATED T AXIS, ECG11: -50 DEGREES
CHLORIDE SERPL-SCNC: 109 MMOL/L (ref 97–108)
CO2 SERPL-SCNC: 25 MMOL/L (ref 21–32)
CREAT SERPL-MCNC: 0.97 MG/DL (ref 0.55–1.02)
DIAGNOSIS, 93000: NORMAL
DIFFERENTIAL METHOD BLD: ABNORMAL
EOSINOPHIL # BLD: 0.1 K/UL (ref 0–0.4)
EOSINOPHIL NFR BLD: 1 % (ref 0–7)
ERYTHROCYTE [DISTWIDTH] IN BLOOD BY AUTOMATED COUNT: 14.7 % (ref 11.5–14.5)
GLUCOSE SERPL-MCNC: 108 MG/DL (ref 65–100)
HCT VFR BLD AUTO: 34.9 % (ref 35–47)
HGB BLD-MCNC: 11.5 G/DL (ref 11.5–16)
IMM GRANULOCYTES # BLD AUTO: 0 K/UL (ref 0–0.04)
IMM GRANULOCYTES NFR BLD AUTO: 0 % (ref 0–0.5)
INR PPP: 1.1 (ref 0.9–1.1)
LYMPHOCYTES # BLD: 3.4 K/UL (ref 0.8–3.5)
LYMPHOCYTES NFR BLD: 37 % (ref 12–49)
MCH RBC QN AUTO: 29.5 PG (ref 26–34)
MCHC RBC AUTO-ENTMCNC: 33 G/DL (ref 30–36.5)
MCV RBC AUTO: 89.5 FL (ref 80–99)
MONOCYTES # BLD: 0.6 K/UL (ref 0–1)
MONOCYTES NFR BLD: 6 % (ref 5–13)
NEUTS SEG # BLD: 5.1 K/UL (ref 1.8–8)
NEUTS SEG NFR BLD: 55 % (ref 32–75)
NRBC # BLD: 0 K/UL (ref 0–0.01)
NRBC BLD-RTO: 0 PER 100 WBC
P-R INTERVAL, ECG05: 138 MS
PERFORMED BY, TECHID: ABNORMAL
PLATELET # BLD AUTO: 129 K/UL (ref 150–400)
PMV BLD AUTO: 12.7 FL (ref 8.9–12.9)
POTASSIUM SERPL-SCNC: 3.7 MMOL/L (ref 3.5–5.1)
PROTHROMBIN TIME: 14.2 SEC (ref 11.9–14.6)
Q-T INTERVAL, ECG07: 406 MS
QRS DURATION, ECG06: 88 MS
QTC CALCULATION (BEZET), ECG08: 468 MS
RBC # BLD AUTO: 3.9 M/UL (ref 3.8–5.2)
SODIUM SERPL-SCNC: 141 MMOL/L (ref 136–145)
TEST DESCRIPTION:,ATST: NORMAL
THERAPEUTIC RANGE,PTTT: ABNORMAL SEC (ref 82–109)
TSH SERPL DL<=0.05 MIU/L-ACNC: 2.08 UIU/ML (ref 0.36–3.74)
VENTRICULAR RATE, ECG03: 80 BPM
WBC # BLD AUTO: 9.3 K/UL (ref 3.6–11)

## 2022-07-28 PROCEDURE — 74011250636 HC RX REV CODE- 250/636: Performed by: INTERNAL MEDICINE

## 2022-07-28 PROCEDURE — 85730 THROMBOPLASTIN TIME PARTIAL: CPT

## 2022-07-28 PROCEDURE — 80048 BASIC METABOLIC PNL TOTAL CA: CPT

## 2022-07-28 PROCEDURE — 36015 PLACE CATHETER IN ARTERY: CPT

## 2022-07-28 PROCEDURE — 75743 ARTERY X-RAYS LUNGS: CPT

## 2022-07-28 PROCEDURE — C1894 INTRO/SHEATH, NON-LASER: HCPCS

## 2022-07-28 PROCEDURE — 74011250636 HC RX REV CODE- 250/636: Performed by: STUDENT IN AN ORGANIZED HEALTH CARE EDUCATION/TRAINING PROGRAM

## 2022-07-28 PROCEDURE — 36415 COLL VENOUS BLD VENIPUNCTURE: CPT

## 2022-07-28 PROCEDURE — 93005 ELECTROCARDIOGRAM TRACING: CPT

## 2022-07-28 PROCEDURE — C1769 GUIDE WIRE: HCPCS

## 2022-07-28 PROCEDURE — 84155 ASSAY OF PROTEIN SERUM: CPT

## 2022-07-28 PROCEDURE — 74011000250 HC RX REV CODE- 250: Performed by: INTERNAL MEDICINE

## 2022-07-28 PROCEDURE — 85025 COMPLETE CBC W/AUTO DIFF WBC: CPT

## 2022-07-28 PROCEDURE — C1757 CATH, THROMBECTOMY/EMBOLECT: HCPCS

## 2022-07-28 PROCEDURE — 77010033678 HC OXYGEN DAILY

## 2022-07-28 PROCEDURE — 84443 ASSAY THYROID STIM HORMONE: CPT

## 2022-07-28 PROCEDURE — 93970 EXTREMITY STUDY: CPT

## 2022-07-28 PROCEDURE — 77030021498

## 2022-07-28 PROCEDURE — 85347 COAGULATION TIME ACTIVATED: CPT

## 2022-07-28 PROCEDURE — 85610 PROTHROMBIN TIME: CPT

## 2022-07-28 PROCEDURE — 74011250637 HC RX REV CODE- 250/637: Performed by: INTERNAL MEDICINE

## 2022-07-28 PROCEDURE — 02CR3ZZ EXTIRPATION OF MATTER FROM LEFT PULMONARY ARTERY, PERCUTANEOUS APPROACH: ICD-10-PCS | Performed by: STUDENT IN AN ORGANIZED HEALTH CARE EDUCATION/TRAINING PROGRAM

## 2022-07-28 PROCEDURE — 65610000006 HC RM INTENSIVE CARE

## 2022-07-28 PROCEDURE — 02CQ3ZZ EXTIRPATION OF MATTER FROM RIGHT PULMONARY ARTERY, PERCUTANEOUS APPROACH: ICD-10-PCS | Performed by: STUDENT IN AN ORGANIZED HEALTH CARE EDUCATION/TRAINING PROGRAM

## 2022-07-28 PROCEDURE — 74011000636 HC RX REV CODE- 636: Performed by: INTERNAL MEDICINE

## 2022-07-28 RX ORDER — HEPARIN SODIUM 1000 [USP'U]/ML
5000 INJECTION, SOLUTION INTRAVENOUS; SUBCUTANEOUS ONCE
Status: COMPLETED | OUTPATIENT
Start: 2022-07-28 | End: 2022-07-28

## 2022-07-28 RX ORDER — FENTANYL CITRATE 50 UG/ML
12.5-5 INJECTION, SOLUTION INTRAMUSCULAR; INTRAVENOUS
Status: DISCONTINUED | OUTPATIENT
Start: 2022-07-28 | End: 2022-08-02 | Stop reason: HOSPADM

## 2022-07-28 RX ADMIN — HEPARIN SODIUM 5000 UNITS: 1000 INJECTION, SOLUTION INTRAVENOUS; SUBCUTANEOUS at 14:35

## 2022-07-28 RX ADMIN — SODIUM CHLORIDE, PRESERVATIVE FREE 10 ML: 5 INJECTION INTRAVENOUS at 05:26

## 2022-07-28 RX ADMIN — ACETAMINOPHEN 650 MG: 325 TABLET, FILM COATED ORAL at 22:03

## 2022-07-28 RX ADMIN — SODIUM CHLORIDE, PRESERVATIVE FREE 10 ML: 5 INJECTION INTRAVENOUS at 18:37

## 2022-07-28 RX ADMIN — HEPARIN SODIUM 12 UNITS/KG/HR: 10000 INJECTION, SOLUTION INTRAVENOUS at 19:58

## 2022-07-28 RX ADMIN — HEPARIN SODIUM 5000 UNITS: 1000 INJECTION, SOLUTION INTRAVENOUS; SUBCUTANEOUS at 14:15

## 2022-07-28 RX ADMIN — HEPARIN SODIUM 2000 UNITS: 1000 INJECTION, SOLUTION INTRAVENOUS; SUBCUTANEOUS at 08:36

## 2022-07-28 RX ADMIN — SODIUM CHLORIDE, PRESERVATIVE FREE 10 ML: 5 INJECTION INTRAVENOUS at 22:12

## 2022-07-28 RX ADMIN — HEPARIN SODIUM 13 UNITS/KG/HR: 10000 INJECTION, SOLUTION INTRAVENOUS at 00:03

## 2022-07-28 RX ADMIN — IOPAMIDOL 201 ML: 755 INJECTION, SOLUTION INTRAVENOUS at 18:00

## 2022-07-28 NOTE — PROCEDURES
Interventional Radiology  Procedure Note        7/28/2022 3:34 PM    Patient: Brijesh Prasad     Informed consent obtained    Diagnosis: Bilateral pulmonary embolism with right heart strain    Procedure(s):  - Pulmonary angiography confirming bilateral main artery defects  - Pre-thrombectomy main PA pressure 50/23, MAP 34  - Thrombectomy of RLL, right main PA, LLL and left main PA  - Post-thrombectomy angiography confirming near complete resolution of lobar defects  - Post-thrombectomy main PA pressure 40/21, MAP 28.    Specimens removed:  small to moderate amount of mixed acute to subacute thrombus    Complications: None    Primary Physician: Kwaku Gusman MD    Recommendations: continue therapeutic heparin. OK to remove right groin pursestring suture after 24hr.     Discharge Disposition: to ICU for observation and monitoring    Full dictated report to follow    Kwaku Gusman MD  Interventional Radiology  [facility] Radiology, [facility].  3:34 PM, 7/28/2022

## 2022-07-28 NOTE — CONSULTS
Pulmonology and Critical Care Consult    Subjective:   Consult Note: 7/28/2022 5:18 PM    Chief Complaint:   Chief Complaint   Patient presents with    Chest Pain    Dizziness    Shortness of Breath        This patient has been seen and evaluated at the request of Dr. Luisito Martinez. Patient is a 80-year-old -American female with history of obesity, hypertension, and hyperlipidemia who presented to the hospital on 7/26/2022 with worsening shortness of breath, left-sided chest pressure, and dizziness and was found to have large volume bilateral pulmonary emboli. Patient states that she does have a brother with a history of blood clots, but denies any personal or family history of cancer. Did have a recent trip to [place], which is a 6-hour drive, in early July. Has been having issues with bilateral lower extremity swelling and reports that she had a venous Doppler 1 month ago that was actually negative for clots. On arrival, she was experiencing significant shortness of breath and was mildly hypoxic on 2 L nasal cannula. Her troponin was elevated at 341 and she was started on a heparin drip for NSTEMI treatment. Cardiology was consulted and at that time a D-dimer was ordered which was elevated at 13.4. This prompted a CTA of the chest which showed bilateral central/segmental/subsegmental pulmonary emboli with moderate to severe clot burden and evidence of right heart failure. Troponin eventually trended back down and an echocardiogram revealed an EF of 65 to 70%, abnormal diastolic function, moderately dilated RV, decreased systolic function of the RV, and moderate to severe tricuspid valve regurgitation. Her CBC is relatively normal today with an improving platelet count up to 129 from 97, TSH normal at 2.08, LFTs show an increased ALT of 110 and increased AST of 104. We will repeat LFTs in the morning.   She is being maintained on a heparin drip and she underwent a thrombectomy today with interventional radiology. Her mean PA pressure was 50/23 with a mean of 34 preprocedure, post procedurally her mean PA pressure was 40/21 with a mean pressure of 28. States that she is feeling better, being monitored in the ICU overnight. Hematology is also following, hypercoagulable work-up is pending. Past Medical History:   Diagnosis Date    Hypertension      History reviewed. No pertinent surgical history. History reviewed. No pertinent family history.   Social History     Tobacco Use    Smoking status: Never    Smokeless tobacco: Never   Substance Use Topics    Alcohol use: Never      Current Facility-Administered Medications   Medication Dose Route Frequency Provider Last Rate Last Admin    fentaNYL citrate (PF) injection 12.5-50 mcg  12.5-50 mcg IntraVENous Multiple Fabiola Munoz MD        rosuvastatin (CRESTOR) tablet 20 mg  20 mg Oral QHS Saravanan Jones MD        heparin 25,000 units in D5W 250 ml infusion  12-25 Units/kg/hr (Adjusted) IntraVENous TITRATE Heri Blanco MD 10.8 mL/hr at 07/28/22 0834 15 Units/kg/hr at 07/28/22 0834    heparin (porcine) 1,000 unit/mL injection 4,000 Units  4,000 Units IntraVENous PRN Heri Blanco MD        Or    heparin (porcine) 1,000 unit/mL injection 2,000 Units  2,000 Units IntraVENous PRGAVIN Blanco MD   2,000 Units at 07/28/22 0836    sodium chloride (NS) flush 5-40 mL  5-40 mL IntraVENous Q8H George Jones MD   10 mL at 07/28/22 0526    sodium chloride (NS) flush 5-40 mL  5-40 mL IntraVENous PRN Heri Blanco MD        acetaminophen (TYLENOL) tablet 650 mg  650 mg Oral Q6H PRN Heri Blanco MD        Or    acetaminophen (TYLENOL) suppository 650 mg  650 mg Rectal Q6H PRN Heri Blanco MD        polyethylene glycol (MIRALAX) packet 17 g  17 g Oral DAILY PRN Heri Blanco MD        ondansetron (ZOFRAN ODT) tablet 4 mg  4 mg Oral Q8H PRN Heri Blanco MD        Or    ondansetron (ZOFRAN) injection 4 mg  4 mg IntraVENous Q6H PRGAVIN Blanco MD nitroglycerin (NITROSTAT) tablet 0.4 mg  0.4 mg SubLINGual PRN Clint Jones MD        metoprolol succinate (TOPROL-XL) XL tablet 25 mg  25 mg Oral DAILY Clint Jones MD   25 mg at 22 110    losartan (COZAAR) tablet 25 mg  25 mg Oral DAILY Wander Blanchard MD   25 mg at 22 1107        Home Meds:  No current facility-administered medications on file prior to encounter. Current Outpatient Medications on File Prior to Encounter   Medication Sig Dispense Refill    metoprolol succinate (TOPROL-XL) 25 mg XL tablet Take 25 mg by mouth in the morning. losartan (COZAAR) 25 mg tablet Take 25 mg by mouth in the morning. pitavastatin calcium (LIVALO) 4 mg tab tablet Take 4 mg by mouth every other day. icosapent ethyL (VASCEPA) 1 gram capsule Take 2 Capsules by mouth two (2) times daily (with meals). acetaminophen (TYLENOL) 500 mg capsule Take 500 mg by mouth as needed for Pain. Allergies   Allergen Reactions    Codeine Other (comments)     Gets real dizzy and nauseated. Nsaids (Non-Steroidal Anti-Inflammatory Drug) Other (comments)     Instant h/a and stomach burning. Sulfa (Sulfonamide Antibiotics) Hives     Rash that looks like measles. Review of Systems:  A comprehensive review of systems was negative except for that written in the HPI. Objective:     Blood pressure 119/69, pulse 89, temperature 97.7 °F (36.5 °C), resp. rate 18, height 5' 6.14\" (1.68 m), weight 90.7 kg (199 lb 15.3 oz), SpO2 97 %.  Temp (24hrs), Av °F (36.7 °C), Min:97.6 °F (36.4 °C), Max:98.3 °F (36.8 °C)      Intake and Output:  Current Shift: 701 - 1900  In: -   Out: 300 [Urine:300]  Last 3 Shifts: 1901 - 700  In: -   Out: 225 [Urine:225]    Physical Exam:   General appearance: alert, cooperative, no distress, appears stated age  Head: Normocephalic, without obvious abnormality, atraumatic  Eyes: negative  Neck: supple, symmetrical, trachea midline, no adenopathy, and no JVD  Lungs: clear to auscultation bilaterally, 2 L nasal cannula  Heart: regular rate and rhythm, S1, S2 normal, no murmur, click, rub or gallop  Abdomen: soft, non-tender. Bowel sounds normal. No masses,  no organomegaly  Extremities: extremities normal, atraumatic, no cyanosis or edema  Pulses: 2+ and symmetric  Skin: Skin color, texture, turgor normal. No rashes or lesions  Lymph nodes: Cervical, supraclavicular, and axillary nodes normal.  Neurologic: Grossly normal, alert and oriented x3    Additional comments:None    Lab/Data Review: All lab results for the last 24 hours reviewed.   Recent Results (from the past 24 hour(s))   TROPONIN-HIGH SENSITIVITY    Collection Time: 07/27/22  7:33 PM   Result Value Ref Range    Troponin-High Sensitivity 152 (HH) 0 - 51 ng/L   PTT    Collection Time: 07/27/22  7:33 PM   Result Value Ref Range    aPTT 146.3 (HH) 21.2 - 34.1 sec    aPTT, therapeutic range   82 - 851 sec   METABOLIC PANEL, BASIC    Collection Time: 07/28/22  3:12 AM   Result Value Ref Range    Sodium 141 136 - 145 mmol/L    Potassium 3.7 3.5 - 5.1 mmol/L    Chloride 109 (H) 97 - 108 mmol/L    CO2 25 21 - 32 mmol/L    Anion gap 7 5 - 15 mmol/L    Glucose 108 (H) 65 - 100 mg/dL    BUN 31 (H) 6 - 20 mg/dL    Creatinine 0.97 0.55 - 1.02 mg/dL    BUN/Creatinine ratio 32 (H) 12 - 20      GFR est AA >60 >60 ml/min/1.73m2    GFR est non-AA 56 (L) >60 ml/min/1.73m2    Calcium 8.9 8.5 - 10.1 mg/dL   TSH 3RD GENERATION    Collection Time: 07/28/22  3:12 AM   Result Value Ref Range    TSH 2.08 0.36 - 3.74 uIU/mL   CBC WITH AUTOMATED DIFF    Collection Time: 07/28/22  3:12 AM   Result Value Ref Range    WBC 9.3 3.6 - 11.0 K/uL    RBC 3.90 3.80 - 5.20 M/uL    HGB 11.5 11.5 - 16.0 g/dL    HCT 34.9 (L) 35.0 - 47.0 %    MCV 89.5 80.0 - 99.0 FL    MCH 29.5 26.0 - 34.0 PG    MCHC 33.0 30.0 - 36.5 g/dL    RDW 14.7 (H) 11.5 - 14.5 %    PLATELET 401 (L) 400 - 400 K/uL    MPV 12.7 8.9 - 12.9 FL    NRBC 0.0 0.0  WBC    ABSOLUTE NRBC 0.00 0.00 - 0.01 K/uL    NEUTROPHILS 55 32 - 75 %    LYMPHOCYTES 37 12 - 49 %    MONOCYTES 6 5 - 13 %    EOSINOPHILS 1 0 - 7 %    BASOPHILS 1 0 - 1 %    IMMATURE GRANULOCYTES 0 0 - 0.5 %    ABS. NEUTROPHILS 5.1 1.8 - 8.0 K/UL    ABS. LYMPHOCYTES 3.4 0.8 - 3.5 K/UL    ABS. MONOCYTES 0.6 0.0 - 1.0 K/UL    ABS. EOSINOPHILS 0.1 0.0 - 0.4 K/UL    ABS. BASOPHILS 0.1 0.0 - 0.1 K/UL    ABS. IMM. GRANS. 0.0 0.00 - 0.04 K/UL    DF AUTOMATED     PROTHROMBIN TIME + INR    Collection Time: 07/28/22  3:12 AM   Result Value Ref Range    Prothrombin time 14.2 11.9 - 14.6 sec    INR 1.1 0.9 - 1.1     PTT    Collection Time: 07/28/22  3:12 AM   Result Value Ref Range    aPTT 39.4 (H) 21.2 - 34.1 sec    aPTT, therapeutic range   82 - 109 sec   PTT    Collection Time: 07/28/22  6:51 AM   Result Value Ref Range    aPTT 46.6 (H) 21.2 - 34.1 sec    aPTT, therapeutic range   82 - 109 sec   EKG, 12 LEAD, SUBSEQUENT    Collection Time: 07/28/22  8:31 AM   Result Value Ref Range    Ventricular Rate 80 BPM    Atrial Rate 80 BPM    P-R Interval 138 ms    QRS Duration 88 ms    Q-T Interval 406 ms    QTC Calculation (Bezet) 468 ms    Calculated P Axis 34 degrees    Calculated R Axis 8 degrees    Calculated T Axis -50 degrees    Diagnosis       Normal sinus rhythm  Nonspecific T wave abnormality  Abnormal ECG  When compared with ECG of 26-JUL-2022 18:14,  T wave inversion no longer evident in Anterior leads  Confirmed by KEYONNA HALE, Luis Gutierrez ([id number]) on 7/28/2022 9:51:16 AM     POC ACTIVATED CLOTTING TIME    Collection Time: 07/28/22  2:41 PM   Result Value Ref Range    Activated Clotting Time (POC) 265 (H) 74 - 125 sec    Performed by Remington Polanco          Chest X-Ray:   DUPLEX LOWER EXT VENOUS BILAT   Final Result      CTA CHEST W OR W WO CONT   Final Result   1. Bilateral central, segmental, and subsegmental pulmonary emboli with   moderate-severe clot burden.    2.  Elevated RV/LV ratio consistent with significant right heart strain. 3.  No acute airspace disease. CT HEAD WO CONT   Final Result   No acute intracranial process. XR CHEST PORT   Final Result   No acute process. IR THROMB MECH ARTERIAL PRIM NON PAULINE OR INTRACRANIAL ADDL    (Results Pending)   IR ANGIO PULMONARY BI    (Results Pending)   IR INTRO CATH PUL ART SEG / SUBSEGMENT    (Results Pending)   IR INTRO CATH PUL ART SEG / SUBSEGMENT    (Results Pending)       CT imaging:  CT Results  (Last 48 hours)                 07/27/22 1830  CTA CHEST W OR W WO CONT Final result    Impression:  1. Bilateral central, segmental, and subsegmental pulmonary emboli with   moderate-severe clot burden. 2.  Elevated RV/LV ratio consistent with significant right heart strain. 3.  No acute airspace disease. Narrative:  EXAM:  CTA CHEST W OR W WO CONT       INDICATION: Dyspnea on exertion       COMPARISON: None. TECHNIQUE: Helical thin section chest CT following uneventful intravenous   administration of nonionic contrast 100 mL of isovue 370 according to   departmental PE protocol. Coronal and sagittal reformats were performed. 3D post   processing was performed. CT dose reduction was achieved through the use of a   standardized protocol tailored for this examination and automatic exposure   control for dose modulation. FINDINGS: This is a good quality study for the evaluation of pulmonary embolism   to the first subsegmental arterial level. Bilateral pulmonary emboli are   identified. On the right, pulmonary emboli are seen in the main right pulmonary   artery, and segmental vessels in the upper and middle lobar arteries. On the   left, clot seen in the left main pulmonary artery, upper lobar segmental and   subsegmental vessels, lingular segmental and subsegmental vessels, and inferior   lobar segmental and subsegmental vessels. Right main pulmonary artery measures   38 mm in diameter.        CHEST WALL: No axillary or supraclavicular lymphadenopathy. THYROID: No nodule. MEDIASTINUM: No mass or lymphadenopathy. LEONOR: No mass or lymphadenopathy. THORACIC AORTA: No aneurysm. HEART: There is an elevated RV LV ratio consistent with right heart strain. ESOPHAGUS: No wall thickening or dilatation. TRACHEA/BRONCHI: Patent. PLEURA: No effusion or pneumothorax. LUNGS: No nodule, mass, or airspace disease. UPPER ABDOMEN: Partially imaged. No acute pathology. BONES: No aggressive bone lesion or fracture. 07/26/22 1938  CT HEAD WO CONT Final result    Impression:  No acute intracranial process. Narrative:  CLINICAL HISTORY: Dizziness/unsteadiness   INDICATION: Dizziness/unsteadiness   COMPARISON: None. CT dose reduction was achieved through use of a standardized protocol tailored   for this examination and automatic exposure control for dose modulation. TECHNIQUE: Serial axial images with a collimation of 5 mm were obtained from the   skull base through the vertex     FINDINGS:    The sulci and ventricles are within normal limits for patient age. There is no   evidence of an acute infarction, hemorrhage, or mass-effect. There is no   evidence of midline shift or hydrocephalus. Posterior fossa structures are   unremarkable. No extra-axial collections are seen. Mastoid air cells are well pneumatized and clear. There is no evidence of depressed skull fractures of soft tissue swelling. PFTs:   PFT Results  (Last 3 results in the past 10 years)      None              Assessment:     Patient is a 77-year-old -American female with history of obesity, hypertension, and hyperlipidemia who presented to the hospital on 7/26/2022 with worsening shortness of breath, left-sided chest pressure, and dizziness and was found to have large volume bilateral pulmonary emboli.     Plan:     1.)  Acute hypoxic respiratory failure  -Secondary to submassive acute pulmonary emboli, now improved status post thrombectomy with IR on 7/28/2022  -Currently on 2 L nasal cannula, weaned off for goal sats greater than 90% on room air  -Being monitored in the ICU overnight status post thrombectomy  -Lung parenchyma otherwise clear on recent CT scan of the chest  -No history of smoking  -Schedule follow-up with in our clinic in 4-6 weeks. 2.)  Bilateral pulmonary emboli with right lower extremity DVT  -Moderate to severe clot burden in bilateral pulmonary arteries, now status post thrombectomy with adequate response  -Continue on heparin drip, likely transition to Eliquis at discharge.   May need lifelong anticoagulation.  -Does have a brother with clots, recent trip to [place] in early July  -Lower extremity venous Doppler demonstrating thrombus in the right popliteal vein/posterior tibial vein  -Close follow-up with hematology in the outpatient setting, hypercoagulable work-up pending    3.)  Elevated troponin  -Troponin elevated to 341 on admission, peaked at 386, now down to 152  -Secondary to acute PE, doubt ACS  -TTE revealed an EF of 65 to 70%, abnormal diastolic function, moderately dilated RV, decreased systolic function of the RV, and moderate to severe tricuspid valve regurgitation.  -Radiology following closely, continue on heparin drip    4.)  Transaminitis  -,  on admission; doubt viral hepatitis  -Suspect secondary to poor perfusion from pulmonary emboli  -Repeat LFTs in the morning    5.)  Thrombocytopenia  -Platelet count 97 on admission, suspect secondary to clot formation  -Now improving to 129 today  -Otology following closely, appreciate their assistance        CODE STATUS: Full Code    Disposition and Family:  Being monitored in ICU overnight    Total time spent with patient: 50 minutes      Concepción Andrew DO  [facility]  7/28/2022  5:18 PM

## 2022-07-28 NOTE — PROGRESS NOTES
Progress Note      7/28/2022 12:50 PM  NAME: Alicia Kirk   MRN:  [id number]   Admit Diagnosis: NSTEMI (non-ST elevated myocardial infarction) [facility] [I21.4]      Subjective:   Chart reviewed. Discussed with the patient. Patient has a shortness of breath and has some improvement. She is found to have pulmonary embolism with a moderate burden of the clot. Anticoagulation has been started. Review of Systems:    Symptom Y/N Comments  Symptom Y/N Comments   Fever/Chills n   Chest Pain n    Poor Appetite    Edema     Cough    Abdominal Pain n    Sputum    Joint Pain     SOB/RAHMAN y Has some improvement  Pruritis/Rash     Nausea/vomit    Other     Diarrhea         Constipation           Could NOT obtain due to:      Objective:          Physical Exam:    Last 24hrs VS reviewed since prior progress note. Most recent are:    Visit Vitals  /69 (BP 1 Location: Right upper arm, BP Patient Position: Sitting)   Pulse 89   Temp 97.7 °F (36.5 °C)   Resp 18   Ht 5' 6.14\" (1.68 m)   Wt 90.7 kg (199 lb 15.3 oz)   SpO2 97%   BMI 32.14 kg/m²       Intake/Output Summary (Last 24 hours) at 7/28/2022 1250  Last data filed at 7/28/2022 0730  Gross per 24 hour   Intake --   Output 525 ml   Net -525 ml        General Appearance: Well developed, well nourished, alert & oriented x 3,    no acute distress. Ears/Nose/Mouth/Throat: Hearing grossly normal.  Neck: Supple. Chest: Lungs clear to auscultation bilaterally. Cardiovascular: Regular rate and rhythm, S1,S2 normal, no murmur. Abdomen: Soft, non-tender, bowel sounds are active. Extremities: No edema bilaterally. Skin: Warm and dry. []         Post-cath site without hematoma, bruit, tenderness, or thrill. Distal pulses intact.     PMH/SH reviewed - no change compared to H&P    Data Review    Telemetry: normal sinus rhythm     EKG:   []  No new EKG for review    Lab Data Personally Reviewed:    Recent Labs     07/28/22  0312 07/27/22  0423   WBC 9.3 9.7   HGB 11.5 11.9   HCT 34.9* 35.5   * 124*     Recent Labs     07/28/22  0651 07/28/22  0312 07/27/22  1933   INR  --  1.1  --    PTP  --  14.2  --    APTT 46.6* 39.4* 146.3*      Recent Labs     07/28/22  0312 07/27/22  0423 07/26/22  1900    141 142   K 3.7 3.4* 3.8   * 110* 112*   CO2 25 23 21   BUN 31* 26* 16   CREA 0.97 0.92 0.75   * 136* 110*   CA 8.9 8.9 8.2*   MG  --   --  1.9     No results for input(s): CPK, CKNDX, TROIQ in the last 72 hours. No lab exists for component: CPKMB  No results found for: CHOL, CHOLX, CHLST, CHOLV, HDL, HDLP, LDL, LDLC, DLDLP, TGLX, TRIGL, TRIGP, CHHD, CHHDX    Recent Labs     07/26/22  1900   AP 90   TP 6.7   ALB 3.0*   GLOB 3.7     No results for input(s): PH, PCO2, PO2 in the last 72 hours. Medications Personally Reviewed:    Current Facility-Administered Medications   Medication Dose Route Frequency    rosuvastatin (CRESTOR) tablet 20 mg  20 mg Oral QHS    heparin 25,000 units in D5W 250 ml infusion  12-25 Units/kg/hr (Adjusted) IntraVENous TITRATE    heparin (porcine) 1,000 unit/mL injection 4,000 Units  4,000 Units IntraVENous PRN    Or    heparin (porcine) 1,000 unit/mL injection 2,000 Units  2,000 Units IntraVENous PRN    sodium chloride (NS) flush 5-40 mL  5-40 mL IntraVENous Q8H    sodium chloride (NS) flush 5-40 mL  5-40 mL IntraVENous PRN    acetaminophen (TYLENOL) tablet 650 mg  650 mg Oral Q6H PRN    Or    acetaminophen (TYLENOL) suppository 650 mg  650 mg Rectal Q6H PRN    polyethylene glycol (MIRALAX) packet 17 g  17 g Oral DAILY PRN    ondansetron (ZOFRAN ODT) tablet 4 mg  4 mg Oral Q8H PRN    Or    ondansetron (ZOFRAN) injection 4 mg  4 mg IntraVENous Q6H PRN    nitroglycerin (NITROSTAT) tablet 0.4 mg  0.4 mg SubLINGual PRN    metoprolol succinate (TOPROL-XL) XL tablet 25 mg  25 mg Oral DAILY    losartan (COZAAR) tablet 25 mg  25 mg Oral DAILY           Problem List:   Patient has a pulmonary embolism.   Troponin I is minimally elevated and pulmonary pressure is about 47 mmHg and right ventricle is mildly dilated with mild hypokinesis. Hypertension. Assessment/Plan:   Recommend anticoagulation. We will follow recommendation of work-up by the hematologist to evaluate for hypercoagulable state. Because she has a relatively low risk factors for pulmonary embolism I believe she should have work-up to also evaluate for occult malignancy. I will leave this work-up up to medical doctor. Thank you. []       High complexity decision making was performed in this patient at high risk for decompensation with multiple organ involvement.     Burke Oneal MD

## 2022-07-28 NOTE — PROGRESS NOTES
Progress Note  Date:2022       Room:[facility]  Patient Name:Leann Hayden     YOB: 1950     Age:72 y.o. Subjective    Subjective :Pt having SOB and dizziness. Denies bleeding. Review of Systems  Objective         Vitals Last 24 Hours:  TEMPERATURE:  Temp  Av °F (36.7 °C)  Min: 97.6 °F (36.4 °C)  Max: 98.3 °F (36.8 °C)  RESPIRATIONS RANGE: Resp  Av  Min: 18  Max: 28  PULSE OXIMETRY RANGE: SpO2  Av.2 %  Min: 95 %  Max: 97 %  PULSE RANGE: Pulse  Av.2  Min: 82  Max: 92  BLOOD PRESSURE RANGE: Systolic (33PKD), DNM:128 , Min:101 , WJV:581   ; Diastolic (10JDU), LRU:31, Min:65, Max:76    I/O (24Hr): Intake/Output Summary (Last 24 hours) at 2022 1700  Last data filed at 2022 0730  Gross per 24 hour   Intake --   Output 525 ml   Net -525 ml     Objective  Pt sleepy  HEENT:ALYSHA  LUNGS:CTA  HEART:RRR  ABDOMEN:Soft,NT  EXT:Edema of legs +  Labs/Imaging/Diagnostics    Labs:  CBC:  Recent Labs     22   WBC 9.3 9.7 7.0   RBC 3.90 4.05 3.07*   HGB 11.5 11.9 8.9*   HCT 34.9* 35.5 27.5*   MCV 89.5 87.7 89.6   RDW 14.7* 14.4 14.4   * 124* 97*     CHEMISTRIES:  Recent Labs     22  1900    141 142   K 3.7 3.4* 3.8   * 110* 112*   CO2 25 23 21   BUN 31* 26* 16   CA 8.9 8.9 8.2*   MG  --   --  1.9   PT/INR:  Recent Labs     22   INR 1.1     APTT:  Recent Labs     22  0651 22   APTT 46.6* 39.4* 146.3*     LIVER PROFILE:  Recent Labs     22   *   *     Lab Results   Component Value Date/Time    ALT (SGPT) 110 (H) 2022 07:00 PM    AST (SGOT) 109 (H) 2022 07:00 PM    Alk.  phosphatase 90 2022 07:00 PM    Bilirubin, total 1.0 2022 07:00 PM       Imaging Last 24 Hours:  CTA CHEST W OR W WO CONT    Result Date: 2022  EXAM:  CTA CHEST W OR W WO CONT INDICATION: Dyspnea on exertion COMPARISON: None. TECHNIQUE: Helical thin section chest CT following uneventful intravenous administration of nonionic contrast 100 mL of isovue 370 according to departmental PE protocol. Coronal and sagittal reformats were performed. 3D post processing was performed. CT dose reduction was achieved through the use of a standardized protocol tailored for this examination and automatic exposure control for dose modulation. FINDINGS: This is a good quality study for the evaluation of pulmonary embolism to the first subsegmental arterial level. Bilateral pulmonary emboli are identified. On the right, pulmonary emboli are seen in the main right pulmonary artery, and segmental vessels in the upper and middle lobar arteries. On the left, clot seen in the left main pulmonary artery, upper lobar segmental and subsegmental vessels, lingular segmental and subsegmental vessels, and inferior lobar segmental and subsegmental vessels. Right main pulmonary artery measures 38 mm in diameter. CHEST WALL: No axillary or supraclavicular lymphadenopathy. THYROID: No nodule. MEDIASTINUM: No mass or lymphadenopathy. LEONOR: No mass or lymphadenopathy. THORACIC AORTA: No aneurysm. HEART: There is an elevated RV LV ratio consistent with right heart strain. ESOPHAGUS: No wall thickening or dilatation. TRACHEA/BRONCHI: Patent. PLEURA: No effusion or pneumothorax. LUNGS: No nodule, mass, or airspace disease. UPPER ABDOMEN: Partially imaged. No acute pathology. BONES: No aggressive bone lesion or fracture. 1.  Bilateral central, segmental, and subsegmental pulmonary emboli with moderate-severe clot burden. 2.  Elevated RV/LV ratio consistent with significant right heart strain. 3.  No acute airspace disease. DUPLEX LOWER EXT VENOUS BILAT    Result Date: 7/28/2022  · Thrombus present in the right popliteal vein. · Thrombus present in the right posterior tibial vein.  · No evidence of deep vein thrombosis in the left lower extremity. Assessment//Plan   Active Problems:    NSTEMI (non-ST elevated myocardial infarction) ([facility] [address] (7/26/2022)      Assessment & Plan  Thrombocytopenia:Improving. Most likely due to platelet consumption from thrombosis. Monitor. Bilateral  pulmonary emboli :moderate to severe with right ventricular strain:pt on IV heparin. Plans for mechanical thrombectomy noted. D/w pt nurse. Hypercoagulable work up as out pt. H/o recent travel. Once stable check CT abdomen,pelvis to r/o malignancy. Most likely needs life long anticogulation due to severe PE unless any bleeding. Eliquis as out pt. Anemia:initial lab may be lab error. Hb normal.Monitor. Elevated troponin:Due to PE. Cardiology following. Elevated liver enzymes:repeat LFTS. r/o viral infection. Recent COVID tests negative according to pt.         Electronically signed by Dennys Gómez MD on 7/28/2022 at 5:00 PM

## 2022-07-28 NOTE — PROGRESS NOTES
IR called back. Pt has significant clot burden with mild right ventricular strain. He does not have staff    Tonight . He will talk to  interventional radiologist works here for possible thrombectomy tomorrow. If any decompensation tonight pt needs to be transferred to [facility].D/w pt and pt nurse. Pt nurse notified hospitalist on call. Continue IV heparin. Monitor closely.

## 2022-07-28 NOTE — PROGRESS NOTES
Problem: Falls - Risk of  Goal: *Absence of Falls  Description: Document Georgette Curry Fall Risk and appropriate interventions in the flowsheet.   Note: Fall Risk Interventions:  Mobility Interventions: Bed/chair exit alarm         Medication Interventions: Assess postural VS orthostatic hypotension, Bed/chair exit alarm, Patient to call before getting OOB, Teach patient to arise slowly    Elimination Interventions: Bed/chair exit alarm, Call light in reach, Patient to call for help with toileting needs no

## 2022-07-28 NOTE — PROGRESS NOTES
[facility] Hospitalist Progress Note  Nghia Barber MD    Date:7/28/2022       Room:[facility]  Patient Name:Leann Hayden     Date of Birth:[birth year]     Age:72 y.o.    7/26/22 admission course  Isidro Galindo is a 67 y.o. female with PMH of hypertension, HLP presented to the ED with a chief complaint of shortness of breath started yesterday. Worsening with mild exertion, associated with dizziness and left-sided chest pressure. Chest pressure is rated 5/10. Also has LE swelling, but attributes to previous knee surgery. Otherwise denies productive cough, wheezing, fever or chills. In ED, placed on 2 L nasal cannula. Troponin elevated at 300s. EKG showed no ST changes. Hemoglobin 8.9, plt 97, no baseline to compare. 7/27/22 no new complaints, tolerating medications well  Tolerating heparin drip well  Case discussed at bedside with her cardiologist Dr Michael Murphy by telephone    7/27/22 echocardiogram  Result status: Final result     Left Ventricle: Normal left ventricular systolic function with a visually estimated EF of 65 - 70%. Left ventricle size is normal. Increased wall thickness. Normal wall motion. Abnormal diastolic function. Right Ventricle: Right ventricle is moderately dilated. Normal wall thickness. Mildly hypokinetic. Pulmonary pressure is 47 mmHg. Mitral Valve: Mild regurgitation. Tricuspid Valve: Moderate to severe regurgitation. 7/27/22 CTA CHEST  IMPRESSION  1. Bilateral central, segmental, and subsegmental pulmonary emboli with  moderate-severe clot burden. 2.  Elevated RV/LV ratio consistent with significant right heart strain. 3.  No acute airspace disease. 7/28/22 INTERVENTIONAL RADIOLOGY  This is a 67 y.o. female with submassive bilateral PE with mild right heart strain who remains hemodynamically stable however has had minimal improvement in her symptoms (chest pressure, shortness of breath) since initiating Heparin gtt. BLE venous duplex is pending.   Procedure to be performed:  PE mechanical thrombectomy  Patient to remain NPO  Plan for sedation:  moderate  Post procedure plan:  observation per protocol  Informed consent:  risks, benefits, and alternatives reviewed with the patient / family who agree to proceed  Code status:  Full Code    22 Venous duplex lower extremities  Thrombus present in the right popliteal vein. Thrombus present in the right posterior tibial vein. No evidence of deep vein thrombosis in the left lower extremity. 22 she is to undergo interventional radiology procedure today      Subjective    Subjective:  Symptoms:  Stable. Review of Systems   All other systems reviewed and are negative. Objective         Vitals Last 24 Hours:  TEMPERATURE:  Temp  Av.1 °F (36.7 °C)  Min: 97.6 °F (36.4 °C)  Max: 98.7 °F (37.1 °C)  RESPIRATIONS RANGE: Resp  Av  Min: 18  Max: 30  PULSE OXIMETRY RANGE: SpO2  Av.5 %  Min: 91 %  Max: 98 %  PULSE RANGE: Pulse  Av.6  Min: 82  Max: 92  BLOOD PRESSURE RANGE: Systolic (98XKV), LQI:393 , Min:101 , LSL:233   ; Diastolic (89SXS), VVB:86, Min:65, Max:76    I/O (24Hr): Intake/Output Summary (Last 24 hours) at 2022 1112  Last data filed at 2022 0444  Gross per 24 hour   Intake --   Output 225 ml   Net -225 ml     Objective:  General Appearance:  Comfortable. Vital signs: (most recent): Blood pressure 102/69, pulse 84, temperature 97.9 °F (36.6 °C), resp. rate 18, height 5' 6.14\" (1.68 m), weight 90.7 kg (199 lb 15.3 oz), SpO2 95 %. HEENT: Normal HEENT exam.    Lungs:  Normal effort. Heart: Normal rate. Regular rhythm. Abdomen: Abdomen is soft. Hyperactive bowel sounds. Skin:  Warm and dry.     Labs/Imaging/Diagnostics    Labs:  CBC:  Recent Labs     22  0312 22  0423 22  1900   WBC 9.3 9.7 7.0   RBC 3.90 4.05 3.07*   HGB 11.5 11.9 8.9*   HCT 34.9* 35.5 27.5*   MCV 89.5 87.7 89.6   RDW 14.7* 14.4 14.4   * 124* 97*       CHEMISTRIES:  Recent Labs 07/28/22  0312 07/27/22  0423 07/26/22  1900    141 142   K 3.7 3.4* 3.8   * 110* 112*   CO2 25 23 21   BUN 31* 26* 16   CA 8.9 8.9 8.2*   MG  --   --  1.9     PT/INR:  Recent Labs     07/28/22 0312   INR 1.1     APTT:  Recent Labs     07/28/22  0651 07/28/22  0312 07/27/22  1933   APTT 46.6* 39.4* 146.3*       LIVER PROFILE:  Recent Labs     07/26/22  1900   *   *       Lab Results   Component Value Date/Time    ALT (SGPT) 110 (H) 07/26/2022 07:00 PM    AST (SGOT) 109 (H) 07/26/2022 07:00 PM    Alk. phosphatase 90 07/26/2022 07:00 PM    Bilirubin, total 1.0 07/26/2022 07:00 PM       Imaging Last 24 Hours:  CTA CHEST W OR W WO CONT    Result Date: 7/27/2022  EXAM:  CTA CHEST W OR W WO CONT INDICATION: Dyspnea on exertion COMPARISON: None. TECHNIQUE: Helical thin section chest CT following uneventful intravenous administration of nonionic contrast 100 mL of isovue 370 according to departmental PE protocol. Coronal and sagittal reformats were performed. 3D post processing was performed. CT dose reduction was achieved through the use of a standardized protocol tailored for this examination and automatic exposure control for dose modulation. FINDINGS: This is a good quality study for the evaluation of pulmonary embolism to the first subsegmental arterial level. Bilateral pulmonary emboli are identified. On the right, pulmonary emboli are seen in the main right pulmonary artery, and segmental vessels in the upper and middle lobar arteries. On the left, clot seen in the left main pulmonary artery, upper lobar segmental and subsegmental vessels, lingular segmental and subsegmental vessels, and inferior lobar segmental and subsegmental vessels. Right main pulmonary artery measures 38 mm in diameter. CHEST WALL: No axillary or supraclavicular lymphadenopathy. THYROID: No nodule. MEDIASTINUM: No mass or lymphadenopathy. LEONOR: No mass or lymphadenopathy. THORACIC AORTA: No aneurysm.  HEART: There is an elevated RV LV ratio consistent with right heart strain. ESOPHAGUS: No wall thickening or dilatation. TRACHEA/BRONCHI: Patent. PLEURA: No effusion or pneumothorax. LUNGS: No nodule, mass, or airspace disease. UPPER ABDOMEN: Partially imaged. No acute pathology. BONES: No aggressive bone lesion or fracture. 1.  Bilateral central, segmental, and subsegmental pulmonary emboli with moderate-severe clot burden. 2.  Elevated RV/LV ratio consistent with significant right heart strain. 3.  No acute airspace disease. DUPLEX LOWER EXT VENOUS BILAT    Result Date: 7/28/2022  · Thrombus present in the right popliteal vein. · Thrombus present in the right posterior tibial vein. · No evidence of deep vein thrombosis in the left lower extremity. Assessment//Plan   Active Problems:    NSTEMI (non-ST elevated myocardial infarction) ([facility] [address] (7/26/2022)    Assessment & Plan  7/26/22 admission course  Isela Alba is a 67 y.o. female with PMH of hypertension, HLP presented to the ED with a chief complaint of shortness of breath started yesterday. Worsening with mild exertion, associated with dizziness and left-sided chest pressure. Chest pressure is rated 5/10. Also has LE swelling, but attributes to previous knee surgery. Otherwise denies productive cough, wheezing, fever or chills. In ED, placed on 2 L nasal cannula. Troponin elevated at 300s. EKG showed no ST changes. Hemoglobin 8.9, plt 97, no baseline to compare. 7/27/22 no new complaints, tolerating medications well  Tolerating heparin drip well  Case discussed at bedside with her cardiologist Dr Saumya Sheets by telephone      7/27/22 echocardiogram  Result status: Final result     Left Ventricle: Normal left ventricular systolic function with a visually estimated EF of 65 - 70%. Left ventricle size is normal. Increased wall thickness. Normal wall motion. Abnormal diastolic function. Right Ventricle: Right ventricle is moderately dilated.  Normal wall thickness. Mildly hypokinetic. Pulmonary pressure is 47 mmHg. Mitral Valve: Mild regurgitation. Tricuspid Valve: Moderate to severe regurgitation. 7/27/22 CTA CHEST  IMPRESSION  1. Bilateral central, segmental, and subsegmental pulmonary emboli with  moderate-severe clot burden. 2.  Elevated RV/LV ratio consistent with significant right heart strain. 3.  No acute airspace disease. 7/28/22 INTERVENTIONAL RADIOLOGY  This is a 67 y.o. female with submassive bilateral PE with mild right heart strain who remains hemodynamically stable however has had minimal improvement in her symptoms (chest pressure, shortness of breath) since initiating Heparin gtt. BLE venous duplex is pending. Procedure to be performed:  PE mechanical thrombectomy  Patient to remain NPO  Plan for sedation:  moderate  Post procedure plan:  observation per protocol  Informed consent:  risks, benefits, and alternatives reviewed with the patient / family who agree to proceed  Code status:  Full Code    7/28/22 Venous duplex lower extremities  Thrombus present in the right popliteal vein. Thrombus present in the right posterior tibial vein. No evidence of deep vein thrombosis in the left lower extremity. 7/28/22 she is to undergo interventional radiology procedure today    ASSESSMENT AND PLAN    1) Venous thromboembolism with submassive pulmonary embolism. Supportive care with respiratory support as needed   Heparin drip   IR thrombectomy today    2) Cardiovascular issues including chest pain at admission, likely a consequence of submassive PE as above.      Continue outpatient regimen    Losartan    Metoprolol    Statin         # Full code  Electronically signed by Halely Barron MD on 7/28/2022 at 7:51 AM

## 2022-07-28 NOTE — CONSULTS
Patient with history of HTN and HLP presented to ED with sob and dizziness x 1 day. Elevated troponin. Initially thought to represent NSTEMI, started on heparin gtt. CTA chest was done which showed bilateral PE with some central clot burden and right heart strain. Pt has elevated trop and dilated/mildly hypokinetic RV on echo. Patient is hemodynamically stable and comfortable on 2 L NC. A/P:  Stable submassive PE  - Unable to offer emergent thrombectomy overnight at [facility] due to staff unavailability on call, patient can be transferred to [facility] if decompensates overnight. - Will sign out to IR covering tomorrow for evaluation of thrombectomy   - Continue heparin gtt  - Fu lower extremity venous duplex    Communicated my recommendations with Dr. Kecia Yadav    Thank you for involving IR with this patient's care. Kelli Rubalcava M.D.   [facility]

## 2022-07-28 NOTE — ROUTINE PROCESS
Pt. Rec'd from IR for overflow boarding for ICU. Pt is A&O X4, VSS, Heparin drip infusing via left AC at 10.8 ml/hr. Rt. Groin site drsg dry and intact. See flow sheet flow assessments.

## 2022-07-28 NOTE — H&P
Radiology History and Physical    Patient: Isela Alba 67 y.o. female       Chief Complaint: Chest Pain, Dizziness, and Shortness of Breath      History of Present Illness: 67year old woman with bilateral pulmonary embolism causing right heart strain. Hemodynamically stable, but not able to tolerate even minimal ambulation\ due to shortness of breath. History:    Past Medical History:   Diagnosis Date    Hypertension      History reviewed. No pertinent family history. Social History     Socioeconomic History    Marital status:      Spouse name: Not on file    Number of children: Not on file    Years of education: Not on file    Highest education level: Not on file   Occupational History    Not on file   Tobacco Use    Smoking status: Never    Smokeless tobacco: Never   Substance and Sexual Activity    Alcohol use: Never    Drug use: Never    Sexual activity: Not on file   Other Topics Concern    Not on file   Social History Narrative    Not on file     Social Determinants of Health     Financial Resource Strain: Not on file   Food Insecurity: Not on file   Transportation Needs: Not on file   Physical Activity: Not on file   Stress: Not on file   Social Connections: Not on file   Intimate Partner Violence: Not on file   Housing Stability: Not on file       Allergies: Allergies   Allergen Reactions    Codeine Other (comments)     Gets real dizzy and nauseated. Nsaids (Non-Steroidal Anti-Inflammatory Drug) Other (comments)     Instant h/a and stomach burning. Sulfa (Sulfonamide Antibiotics) Hives     Rash that looks like measles.         Current Medications:  Current Facility-Administered Medications   Medication Dose Route Frequency    fentaNYL citrate (PF) injection 12.5-50 mcg  12.5-50 mcg IntraVENous Multiple    rosuvastatin (CRESTOR) tablet 20 mg  20 mg Oral QHS    heparin 25,000 units in D5W 250 ml infusion  12-25 Units/kg/hr (Adjusted) IntraVENous TITRATE    heparin (porcine) 1,000 unit/mL injection 4,000 Units  4,000 Units IntraVENous PRN    Or    heparin (porcine) 1,000 unit/mL injection 2,000 Units  2,000 Units IntraVENous PRN    sodium chloride (NS) flush 5-40 mL  5-40 mL IntraVENous Q8H    sodium chloride (NS) flush 5-40 mL  5-40 mL IntraVENous PRN    acetaminophen (TYLENOL) tablet 650 mg  650 mg Oral Q6H PRN    Or    acetaminophen (TYLENOL) suppository 650 mg  650 mg Rectal Q6H PRN    polyethylene glycol (MIRALAX) packet 17 g  17 g Oral DAILY PRN    ondansetron (ZOFRAN ODT) tablet 4 mg  4 mg Oral Q8H PRN    Or    ondansetron (ZOFRAN) injection 4 mg  4 mg IntraVENous Q6H PRN    nitroglycerin (NITROSTAT) tablet 0.4 mg  0.4 mg SubLINGual PRN    metoprolol succinate (TOPROL-XL) XL tablet 25 mg  25 mg Oral DAILY    losartan (COZAAR) tablet 25 mg  25 mg Oral DAILY        Physical Exam:  Blood pressure 119/69, pulse 89, temperature 97.7 °F (36.5 °C), resp. rate 18, height 5' 6.14\" (1.68 m), weight 90.7 kg (199 lb 15.3 oz), SpO2 97 %. GENERAL: alert, cooperative, no distress, appears stated age,   LUNG: Nonlabored respiration on 4L O2 by NC  HEART: regular rate and rhythm,    Alerts:    Hospital Problems  Never Reviewed            Codes Class Noted POA    NSTEMI (non-ST elevated myocardial infarction) [facility]) ICD-10-CM: I21.4  ICD-9-CM: 410.70  7/26/2022 Unknown           Laboratory:      Recent Labs     07/28/22 0312   HGB 11.5   HCT 34.9*   WBC 9.3   *   INR 1.1   BUN 31*   CREA 0.97   K 3.7         Plan of Care/Planned Procedure:  Risks, benefits, and alternatives reviewed with patient and she agrees to proceed with the procedure. Pulmonary angiogram with thrombectomy    Deemed appropriate for moderate sedation with versed and fentanyl.     Ayana Ureña MD  Interventional Radiology  [facility]  1:31 PM, 7/28/2022

## 2022-07-28 NOTE — ROUTINE PROCESS
TRANSFER - OUT REPORT:    Verbal report given to Dione(name) on [employer]  being transferred to (unit) for routine progression of care       Report consisted of patients Situation, Background, Assessment and   Recommendations(SBAR). Information from the following report(s) SBAR, Procedure Summary, Intake/Output, Recent Results, and Cardiac Rhythm SR  was reviewed with the receiving nurse. Opportunity for questions and clarification was provided.       Patient transported with:   Monitor  O2 @ 2 liters  Registered Nurse Rishi Pedroza

## 2022-07-28 NOTE — PROGRESS NOTES
Pt seen for thrombocytopenia. Due to SOB I ordered CTA chest to r/o PE. Pt seen by cardiology for mild elevation of troponin and on IV heparin. No body called me about CTA results. I just looking for results and found pt has BL PE with moderate to severe clot burden and right ventricular strain. I called pt nurse. Pt hemodynamically stable. IR and pulmonary consulted. Any problems pt needs to be transfered to ICU. Pt nurse going to notify  and call pulmonary. Continue IV heparin. I called and talked to  IR. He is going to review pt CTA chest and going to call me.

## 2022-07-28 NOTE — CONSULTS
INTERVENTIONAL RADIOLOGY  Consult Note      Patient:  Tabatha Britt  :  1950  Age:  67 y.o. MRN:  [id number]    Today's Date:  2022  Admission Date:  2022  Hospital Day:  2  Consult requested by:  Stacie Diane MD      CC / HPI   Ms. Benito Marroquin is a 67year old female with admitted with dizziness, shortness of breath, and chest pressure. Patient with elevated troponin, initially treated for NSTEMI and initiated heparin gtt. She also reported BLE swelling dating back to  after a car trip to [facility]. Upon admission, CTA demonstrated bilateral PE (submassive) with central clot burden and mild right heart strain. Upon examination today, patient reports persistent chest pressure, shortness of breath with exertion (ambulation, talking), BLE pain and swelling. She further denies any improvement in her symptoms since she was admitted and initiated heparin gtt. She denies any prior history of PE or DVT. IR consulted to evaluate the patient for mechanical thrombectomy. PAST MEDICAL HISTORY  Past Medical History:   Diagnosis Date    Hypertension        PAST SURGICAL HISTORY  History reviewed. No pertinent surgical history.     SOCIAL HISTORY  Social History     Socioeconomic History    Marital status:      Spouse name: Not on file    Number of children: Not on file    Years of education: Not on file    Highest education level: Not on file   Occupational History    Not on file   Tobacco Use    Smoking status: Never    Smokeless tobacco: Never   Substance and Sexual Activity    Alcohol use: Never    Drug use: Never    Sexual activity: Not on file   Other Topics Concern    Not on file   Social History Narrative    Not on file     Social Determinants of Health     Financial Resource Strain: Not on file   Food Insecurity: Not on file   Transportation Needs: Not on file   Physical Activity: Not on file   Stress: Not on file   Social Connections: Not on file   Intimate Partner Violence: Not on file   Housing Stability: Not on file       FAMILY HISTORY  History reviewed. No pertinent family history. CURRENT MEDICATIONS  Current Facility-Administered Medications   Medication Dose Route Frequency Provider Last Rate Last Admin    rosuvastatin (CRESTOR) tablet 20 mg  20 mg Oral QHS Saravanan Jones MD        heparin 25,000 units in D5W 250 ml infusion  12-25 Units/kg/hr (Adjusted) IntraVENous TITRATE Roscoe Brantley MD 10.8 mL/hr at 07/28/22 0834 15 Units/kg/hr at 07/28/22 0834    heparin (porcine) 1,000 unit/mL injection 4,000 Units  4,000 Units IntraVENous PRN Radha Jones MD        Or    heparin (porcine) 1,000 unit/mL injection 2,000 Units  2,000 Units IntraVENous PRN Roscoe Brantley MD   2,000 Units at 07/28/22 0836    sodium chloride (NS) flush 5-40 mL  5-40 mL IntraVENous Q8H Radha Jones MD   10 mL at 07/28/22 0526    sodium chloride (NS) flush 5-40 mL  5-40 mL IntraVENous PRN Radha Jones MD        acetaminophen (TYLENOL) tablet 650 mg  650 mg Oral Q6H PRN Radha Jones MD        Or    acetaminophen (TYLENOL) suppository 650 mg  650 mg Rectal Q6H PRN Radha Jones MD        polyethylene glycol (MIRALAX) packet 17 g  17 g Oral DAILY PRN Radha Jones MD        ondansetron (ZOFRAN ODT) tablet 4 mg  4 mg Oral Q8H PRN Radha Jones MD        Or    ondansetron (ZOFRAN) injection 4 mg  4 mg IntraVENous Q6H PRN Radha Jones MD        nitroglycerin (NITROSTAT) tablet 0.4 mg  0.4 mg SubLINGual PRN Roscoe Brantley MD        metoprolol succinate (TOPROL-XL) XL tablet 25 mg  25 mg Oral DAILY Roscoe Brantley MD   25 mg at 07/27/22 1107    losartan (COZAAR) tablet 25 mg  25 mg Oral DAILY Roscoe Brantley MD   25 mg at 07/27/22 1107       ALLERGIES  Allergies   Allergen Reactions    Codeine Other (comments)     Gets real dizzy and nauseated. Nsaids (Non-Steroidal Anti-Inflammatory Drug) Other (comments)     Instant h/a and stomach burning.      Sulfa (Sulfonamide Antibiotics) Hives     Rash that looks like measles. DIAGNOSTIC STUDIES   IMAGING STUDIES  I personally reviewed the following imaging studies:  CT Results (most recent):  Results from Hospital Encounter encounter on 07/26/22    CTA CHEST W OR W WO CONT    Narrative  EXAM:  CTA CHEST W OR W WO CONT    INDICATION: Dyspnea on exertion    COMPARISON: None. TECHNIQUE: Helical thin section chest CT following uneventful intravenous  administration of nonionic contrast 100 mL of isovue 370 according to  departmental PE protocol. Coronal and sagittal reformats were performed. 3D post  processing was performed. CT dose reduction was achieved through the use of a  standardized protocol tailored for this examination and automatic exposure  control for dose modulation. FINDINGS: This is a good quality study for the evaluation of pulmonary embolism  to the first subsegmental arterial level. Bilateral pulmonary emboli are  identified. On the right, pulmonary emboli are seen in the main right pulmonary  artery, and segmental vessels in the upper and middle lobar arteries. On the  left, clot seen in the left main pulmonary artery, upper lobar segmental and  subsegmental vessels, lingular segmental and subsegmental vessels, and inferior  lobar segmental and subsegmental vessels. Right main pulmonary artery measures  38 mm in diameter. CHEST WALL: No axillary or supraclavicular lymphadenopathy. THYROID: No nodule. MEDIASTINUM: No mass or lymphadenopathy. LEONOR: No mass or lymphadenopathy. THORACIC AORTA: No aneurysm. HEART: There is an elevated RV LV ratio consistent with right heart strain. ESOPHAGUS: No wall thickening or dilatation. TRACHEA/BRONCHI: Patent. PLEURA: No effusion or pneumothorax. LUNGS: No nodule, mass, or airspace disease. UPPER ABDOMEN: Partially imaged. No acute pathology. BONES: No aggressive bone lesion or fracture. Impression  1.   Bilateral central, segmental, and subsegmental pulmonary emboli with  moderate-severe clot burden. 2.  Elevated RV/LV ratio consistent with significant right heart strain. 3.  No acute airspace disease. LABS  Lab Results   Component Value Date/Time    WBC 9.3 07/28/2022 03:12 AM    HGB 11.5 07/28/2022 03:12 AM    HCT 34.9 (L) 07/28/2022 03:12 AM    PLATELET 410 (L) 33/21/1651 03:12 AM    MCV 89.5 07/28/2022 03:12 AM     Lab Results   Component Value Date/Time    Sodium 141 07/28/2022 03:12 AM    Potassium 3.7 07/28/2022 03:12 AM    Chloride 109 (H) 07/28/2022 03:12 AM    CO2 25 07/28/2022 03:12 AM    Anion gap 7 07/28/2022 03:12 AM    Glucose 108 (H) 07/28/2022 03:12 AM    BUN 31 (H) 07/28/2022 03:12 AM    Creatinine 0.97 07/28/2022 03:12 AM    BUN/Creatinine ratio 32 (H) 07/28/2022 03:12 AM    GFR est AA >60 07/28/2022 03:12 AM    GFR est non-AA 56 (L) 07/28/2022 03:12 AM    Calcium 8.9 07/28/2022 03:12 AM     Lab Results   Component Value Date/Time    INR 1.1 07/28/2022 03:12 AM    Prothrombin time 14.2 07/28/2022 03:12 AM       REVIEW OF SYSTEMS   (Positive findings are bolded, all others negative)  Constitutional:  Fever, Chills, Night sweats, Unintentional weight loss, Weight gain, Anorexia, Fatigue, Somnolence.    Eyes:  Vision loss, Vision change, Eye pain, Redness, Discharge  ENT:  Otalgia, Otorrhea, Hearing loss, Tinnitus, Epistaxis, Rhinorrhea, Sinus Congestion, Sore throat, Oral lesions, Tooth pain, Bleeding gums, Dysphonia, Dysphagia, Neck pain  Cardiovascular:  Chest pain, Palpitations, Dyspnea on exertion, Orthopnea, Paroxysmal nocturnal dyspnea, Leg swelling, Claudication  Respiratory:  Cough, Sputum production, Hemoptysis, Wheezing, Snoring, Shortness of breath  Gastrointestinal:  Nausea, Vomiting, Abdominal pain, Dyspepsia, Diarrhea, Constipation, Hematochezia, Melena, Encopresis  Genitourinary:  Pelvic pain, Dysuria, Frequency, Urgency, Hematuria, Retention, Incontinence, Testicular pain, Scrotal mass / Swelling, Erectile dysfunction, Menorrhagia, Metrorrhagia, Postmenopausal bleeding, Dysmenorrhea, Discharge  Musculoskeletal:  Back pain, Joint pain, Joint swelling, Muscle pain, Muscle spasm  Integumentary:  Rash, Pruritus, Dryness, Discoloration, Non-healing sores / Open wounds, Breast lumps, Mastalgia, Galactorrhea, Alopecia  Neurological:  Headache, Weakness, Paresthesias, Memory loss, Seizures, Dizziness, Syncope, Ataxia, Tremor  Psychiatric:  Anxiety, Depression, Irritability, Insomnia, Paranoia, Hallucinations, Suicidal ideations  Endocrine:  Heat / Cold intolerance, Polydipsia, Polyphagia  Hematologic / Lymphatic:  Lymphadenopathy, Bleeding disorder  Allergic / Immunologic / Infectious:  Urticaria, Seasonal / Environmental allergies, Persistent / Recurrent infection    PHYSICAL EXAM   /69 (BP 1 Location: Left upper arm, BP Patient Position: At rest)   Pulse 84   Temp 97.9 °F (36.6 °C)   Resp 18   Ht 5' 6.14\" (1.68 m)   Wt 90.7 kg (199 lb 15.3 oz)   SpO2 95%   BMI 32.14 kg/m²   General:  Calm, cooperative, NAD  HEENT:  NCAT, EOMI, conjunctiva clear, MMM  Heart:  RRR, S1S2 normal  Lungs:  CTAB, NWOB  Abdomen:  Soft, NT, ND  Extremities:  MAEW, +1 BLE edema, RLE calf tenderness  Skin:  Warm and dry, color normal, no rashes  Neurological:  AAOX3, speech clear and coherent    ASSESSMENT   This is a 67 y.o. female with submassive bilateral PE with mild right heart strain who remains hemodynamically stable however has had minimal improvement in her symptoms (chest pressure, shortness of breath) since initiating Heparin gtt. BLE venous duplex is pending. PLAN   Procedure to be performed:  PE mechanical thrombectomy  Patient to remain NPO  Plan for sedation:  moderate  Post procedure plan:  observation per protocol  Informed consent:  risks, benefits, and alternatives reviewed with the patient / family who agree to proceed  Code status:  Full Code      Case discussed with Allene Aschoff, MD.    Thank you for asking us to participate in the care of this patient.       Rosy PAGAN Tanna Martins, [address] [facility], [place].      CC:  Miladis Olivares MD

## 2022-07-29 LAB
ALBUMIN SERPL-MCNC: 2.7 G/DL (ref 3.5–5)
ALBUMIN/GLOB SERPL: 0.9 {RATIO} (ref 1.1–2.2)
ALP SERPL-CCNC: 81 U/L (ref 45–117)
ALT SERPL-CCNC: 87 U/L (ref 12–78)
ANION GAP SERPL CALC-SCNC: 7 MMOL/L (ref 5–15)
APTT PPP: 66 SEC (ref 21.2–34.1)
APTT PPP: 66.7 SEC (ref 21.2–34.1)
APTT PPP: 70.5 SEC (ref 21.2–34.1)
AST SERPL W P-5'-P-CCNC: 36 U/L (ref 15–37)
BILIRUB DIRECT SERPL-MCNC: 0.3 MG/DL (ref 0–0.2)
BILIRUB SERPL-MCNC: 1 MG/DL (ref 0.2–1)
BUN SERPL-MCNC: 19 MG/DL (ref 6–20)
BUN/CREAT SERPL: 32 (ref 12–20)
CA-I BLD-MCNC: 8.2 MG/DL (ref 8.5–10.1)
CHLORIDE SERPL-SCNC: 110 MMOL/L (ref 97–108)
CO2 SERPL-SCNC: 26 MMOL/L (ref 21–32)
CREAT SERPL-MCNC: 0.6 MG/DL (ref 0.55–1.02)
ERYTHROCYTE [DISTWIDTH] IN BLOOD BY AUTOMATED COUNT: 14.5 % (ref 11.5–14.5)
GLOBULIN SER CALC-MCNC: 3 G/DL (ref 2–4)
GLUCOSE SERPL-MCNC: 93 MG/DL (ref 65–100)
HCT VFR BLD AUTO: 31.5 % (ref 35–47)
HGB BLD-MCNC: 10.4 G/DL (ref 11.5–16)
MCH RBC QN AUTO: 29.6 PG (ref 26–34)
MCHC RBC AUTO-ENTMCNC: 33 G/DL (ref 30–36.5)
MCV RBC AUTO: 89.7 FL (ref 80–99)
MRSA DNA SPEC QL NAA+PROBE: NOT DETECTED
NRBC # BLD: 0 K/UL (ref 0–0.01)
NRBC BLD-RTO: 0 PER 100 WBC
PLATELET # BLD AUTO: 115 K/UL (ref 150–400)
PMV BLD AUTO: 12.8 FL (ref 8.9–12.9)
POTASSIUM SERPL-SCNC: 3.3 MMOL/L (ref 3.5–5.1)
PROT SERPL-MCNC: 5.7 G/DL (ref 6.4–8.2)
RBC # BLD AUTO: 3.51 M/UL (ref 3.8–5.2)
SODIUM SERPL-SCNC: 143 MMOL/L (ref 136–145)
THERAPEUTIC RANGE,PTTT: ABNORMAL SEC (ref 82–109)
WBC # BLD AUTO: 7.7 K/UL (ref 3.6–11)

## 2022-07-29 PROCEDURE — 87641 MR-STAPH DNA AMP PROBE: CPT

## 2022-07-29 PROCEDURE — 65610000006 HC RM INTENSIVE CARE

## 2022-07-29 PROCEDURE — 77010033678 HC OXYGEN DAILY

## 2022-07-29 PROCEDURE — B31S1ZZ FLUOROSCOPY OF RIGHT PULMONARY ARTERY USING LOW OSMOLAR CONTRAST: ICD-10-PCS | Performed by: STUDENT IN AN ORGANIZED HEALTH CARE EDUCATION/TRAINING PROGRAM

## 2022-07-29 PROCEDURE — 74011250636 HC RX REV CODE- 250/636: Performed by: INTERNAL MEDICINE

## 2022-07-29 PROCEDURE — 93005 ELECTROCARDIOGRAM TRACING: CPT

## 2022-07-29 PROCEDURE — 80076 HEPATIC FUNCTION PANEL: CPT

## 2022-07-29 PROCEDURE — B31T1ZZ FLUOROSCOPY OF LEFT PULMONARY ARTERY USING LOW OSMOLAR CONTRAST: ICD-10-PCS | Performed by: STUDENT IN AN ORGANIZED HEALTH CARE EDUCATION/TRAINING PROGRAM

## 2022-07-29 PROCEDURE — B31U1ZZ FLUOROSCOPY OF PULMONARY TRUNK USING LOW OSMOLAR CONTRAST: ICD-10-PCS | Performed by: STUDENT IN AN ORGANIZED HEALTH CARE EDUCATION/TRAINING PROGRAM

## 2022-07-29 PROCEDURE — 85027 COMPLETE CBC AUTOMATED: CPT

## 2022-07-29 PROCEDURE — 74011250637 HC RX REV CODE- 250/637: Performed by: INTERNAL MEDICINE

## 2022-07-29 PROCEDURE — 80048 BASIC METABOLIC PNL TOTAL CA: CPT

## 2022-07-29 PROCEDURE — 36415 COLL VENOUS BLD VENIPUNCTURE: CPT

## 2022-07-29 PROCEDURE — 85730 THROMBOPLASTIN TIME PARTIAL: CPT

## 2022-07-29 PROCEDURE — 74011000250 HC RX REV CODE- 250: Performed by: INTERNAL MEDICINE

## 2022-07-29 RX ORDER — POTASSIUM CHLORIDE 20 MEQ/1
40 TABLET, EXTENDED RELEASE ORAL
Status: COMPLETED | OUTPATIENT
Start: 2022-07-29 | End: 2022-07-29

## 2022-07-29 RX ADMIN — SODIUM CHLORIDE, PRESERVATIVE FREE 10 ML: 5 INJECTION INTRAVENOUS at 17:34

## 2022-07-29 RX ADMIN — HEPARIN SODIUM 2000 UNITS: 1000 INJECTION, SOLUTION INTRAVENOUS; SUBCUTANEOUS at 11:20

## 2022-07-29 RX ADMIN — HEPARIN SODIUM 16 UNITS/KG/HR: 10000 INJECTION, SOLUTION INTRAVENOUS at 11:21

## 2022-07-29 RX ADMIN — HEPARIN SODIUM 2000 UNITS: 1000 INJECTION, SOLUTION INTRAVENOUS; SUBCUTANEOUS at 03:59

## 2022-07-29 RX ADMIN — ACETAMINOPHEN 650 MG: 325 TABLET, FILM COATED ORAL at 20:32

## 2022-07-29 RX ADMIN — HEPARIN SODIUM 2000 UNITS: 1000 INJECTION, SOLUTION INTRAVENOUS; SUBCUTANEOUS at 20:21

## 2022-07-29 RX ADMIN — SODIUM CHLORIDE, PRESERVATIVE FREE 5 ML: 5 INJECTION INTRAVENOUS at 05:29

## 2022-07-29 RX ADMIN — POTASSIUM CHLORIDE 40 MEQ: 1500 TABLET, EXTENDED RELEASE ORAL at 09:17

## 2022-07-29 RX ADMIN — HEPARIN SODIUM 14 UNITS/KG/HR: 10000 INJECTION, SOLUTION INTRAVENOUS at 04:03

## 2022-07-29 RX ADMIN — METOPROLOL SUCCINATE 25 MG: 25 TABLET, EXTENDED RELEASE ORAL at 09:17

## 2022-07-29 RX ADMIN — LOSARTAN POTASSIUM 25 MG: 50 TABLET, FILM COATED ORAL at 09:17

## 2022-07-29 RX ADMIN — SODIUM CHLORIDE, PRESERVATIVE FREE 10 ML: 5 INJECTION INTRAVENOUS at 23:02

## 2022-07-29 NOTE — PROGRESS NOTES
[place] [id number] PICC team consulted for assessment of alternate PIV site options. Discussed with client, declined at this time as patient stated, \"I am okay to hold my arms straight. \" Discussed with patient and primary care nurse at bedside. Please contact [place] [address] PICC services at  if client prefers alternate site or additional UA-guided PIV sites are required.

## 2022-07-29 NOTE — PROGRESS NOTES
INTERVENTIONAL RADIOLOGY  Progress Note      Patient:  Nasrin Rush  :  1950  Age:  67 y.o. MRN:  [id number]    Today's Date:  2022  Admission Date:  2022  Hospital Day:  3      SUBJECTIVE   Nasrin Rush is a 67 y.o. female with a history of bilateral pulmonary embolism with right heart strain. Patient is s/p mechanical thrombectomy of RLL, right main PA, LLL, and left main PA. Patient reports that she is feeling much better. She notes improvement in her shortness of breath and chest pressure. She has not yet ambulated due to right femoral Flowstasis device of the right common femoral vein. OBJECTIVE   IMAGING STUDIES  I personally reviewed the following imaging studies:  IR Results (most recent):  Results from Hospital Encounter encounter on 22    IR THROMB [facility] [place] ARTERIAL PRIM NON PAULINE OR INTRACRANIAL ADDL    Narrative  Clinical Data: A 67year-old patient with bilateral pulmonary artery embolism  presents for pulmonary angiogram thrombectomy. Hemodynamically stable, but with  severe shortness of breath on exertion. Date: 2022    Operating Attending: Simran Cyr M.D. Estimated Blood Loss: Minimal  Specimens Removed: None  Complications: None    Procedure:    1. Ultrasound guidance for vascular access into the right common femoral vein. 2. Serial dilatation up to 25 [place], with placement of a 24 Libyan sheath into  the inferior vena cava. 3. Fluoroscopic guidance for access of a pigtail flush catheter into the main  pulmonary artery. 4. Pulmonary angiography. 5. Fluoroscopic guidance for placing the 24 Libyan aspiration catheter into the  right lower lobe pulmonary artery. 6. Aspiration thrombectomy of the right lower lobe pulmonary artery and right  main pulmonary artery, with post thrombectomy angiogram.  7. Fluoroscopic guidance for placing the 24 Libyan aspiration catheter into the  left lower lobe pulmonary artery.   8. Aspiration thrombectomy of the left lower lobe pulmonary artery and left main  pulmonary artery, with postthrombectomy angiogram.  9. Purse string suture closure of the right common femoral vein. FLUOROSCOPY TIME: 8.0 min  FLUOROSCOPY DOSE (air kerma): 201.1 mGy    Technique: After full discussion of the procedure including the risks, benefits,  and alternatives, we obtained both verbal and written consent to continue. The patient was positioned supine  on the angiography table. Sterile preparation  of the right groin. A timeout was performed to ensure proper patient, procedure  and site. 1% lidocaine used for local anesthesia. Using ultrasound guidance, a 21-gauge needle was advanced into the right common  femoral vein. Using standard Seldinger technique, the needle was exchanged for a  transitional dilator. An Amplatz wire was advanced into the superior vena cava  under live fluoroscopic guidance. Serial dilatation at the right groin access  site was performed up to 22 [place]. The 24 Dominican dry seal sheath was advanced into the groin to the level of the  inferior vena cava. Through the sheath, a 5 Dominican angled pig tail catheter was  advanced through the right heart, and into the pulmonary artery. Angiography was  performed from the main pulmonary artery, which confirmed filling defects in the  right main pulmonary artery, right lower lobe, and right upper lobe, as well as  the left lower lobe. Under live fluoroscopic guidance, an Amplatz wire and angled tip catheter were  maneuvered into the right lower lobe pulmonary artery. The catheter was  exchanged over the wire for the 24 Dominican aspiration catheter. Aspiration  thrombectomy was performed under this position, obtaining several acute to  subacute thrombi. Aspiration thrombectomy was repeated in the right main  pulmonary artery, again obtaining acute to subacute thrombi. Postthrombectomy  angiogram confirmed improved perfusion of the right lung.     Attention was then turned to the left lobe. An Amplatz wire and angled tip  catheter was maneuvered into the left lower lobe pulmonary artery. The catheter  was exchanged over the wire for the 24 Yemeni aspiration catheter. Aspiration  thrombectomy was performed under this position, obtaining a small amount of  acute to subacute thrombus. Aspiration thrombectomy was repeated in the left  main pulmonary artery, again containing a small amount of acute to subacute  thrombus. Postembolization angiogram confirmed improved perfusion of the left  lung. The aspiration catheter was removed over the wire, the and wire completely  withdrawn. A pursestring suture was prepared at the right groin with a 2-0  Prolene, then the 22 Yemeni dry seal sheath was removed and a pursestring suture  secured. Pressure was held at the right groin access site until hemostasis was  achieved. A sterile dressing was applied. The patient tolerated the procedure well, there were no complications. Prethrombectomy pressure measurement in the main pulmonary artery: 50/23, MAP 34  Post thrombectomy pressure measurement in the main pulmonary artery: 40/21, MAP  28    Impression  Successful aspiration thrombectomy of acute to subacute thrombus in the right  lower lobe, right main pulmonary artery, left lower lobe, and left main  pulmonary artery, with marked improvement of bilateral lower lobe perfusion post  thrombectomy.         LABS  Lab Results   Component Value Date/Time    WBC 7.7 07/29/2022 02:18 AM    HGB 10.4 (L) 07/29/2022 02:18 AM    HCT 31.5 (L) 07/29/2022 02:18 AM    PLATELET 008 (L) 10/17/4637 02:18 AM    MCV 89.7 07/29/2022 02:18 AM     Lab Results   Component Value Date/Time    Sodium 143 07/29/2022 02:18 AM    Potassium 3.3 (L) 07/29/2022 02:18 AM    Chloride 110 (H) 07/29/2022 02:18 AM    CO2 26 07/29/2022 02:18 AM    Anion gap 7 07/29/2022 02:18 AM    Glucose 93 07/29/2022 02:18 AM    BUN 19 07/29/2022 02:18 AM    Creatinine 0.60 07/29/2022 02:18 AM BUN/Creatinine ratio 32 (H) 07/29/2022 02:18 AM    GFR est AA >60 07/29/2022 02:18 AM    GFR est non-AA >60 07/29/2022 02:18 AM    Calcium 8.2 (L) 07/29/2022 02:18 AM     Lab Results   Component Value Date/Time    INR 1.1 07/28/2022 03:12 AM    Prothrombin time 14.2 07/28/2022 03:12 AM       DRAIN OUTPUT  Output by Drain (mL) 07/27/22 0701 - 07/27/22 1900 07/27/22 1901 - 07/28/22 0700 07/28/22 0701 - 07/28/22 1900 07/28/22 1901 - 07/29/22 0700 07/29/22 0701 - 07/29/22 1650   Requested LDAs do not have output data documented. PHYSICAL EXAM  BP (!) (P) 161/98 (BP 1 Location: Right upper arm, BP Patient Position: At rest)   Pulse 99   Temp 98.2 °F (36.8 °C)   Resp 21   Ht 5' 6.14\" (1.68 m)   Wt 90.7 kg (199 lb 15.3 oz)   SpO2 95%   BMI 32.14 kg/m²   General:  NAD  Heart:  RRR  Lungs:  NWOB  Extremities: +1 BLE edema, Right groin access site dry and intact. No hematoma. Purse string suture was subsequently removed. Mild hemorrhage noted. Hemostasis achieved with manual pressure and one interrupted suture at right groin access site. Neurological:  AAOX3    ASSESSMENT / PLAN   This is a 67 y.o. female with a history of bilateral pulmonary emboli who is one day s/p mechanical thrombectomy of RLL, right main PA, LLL, and left main PA; with improvement in her symptoms. Patient remains on Heparin gtt. Plan:  Continue supine position x4 hours s/p right access site suture placement then up ad lori. IR to remove right access site suture on Monday, 8/1/2022. Continue medical management. Continue Heparin gtt as tolerated. Please call IR with any questions or concerns.      Fely Officer, [facility]

## 2022-07-29 NOTE — PROGRESS NOTES
Problem: Falls - Risk of  Goal: *Absence of Falls  Description: Document Leafy Vargas Fall Risk and appropriate interventions in the flowsheet.   Outcome: Progressing Towards Goal  Note: Fall Risk Interventions:  Mobility Interventions: Bed/chair exit alarm         Medication Interventions: Bed/chair exit alarm    Elimination Interventions: Call light in reach    History of Falls Interventions: Bed/chair exit alarm         Problem: Patient Education: Go to Patient Education Activity  Goal: Patient/Family Education  Outcome: Progressing Towards Goal

## 2022-07-29 NOTE — PROGRESS NOTES
Progress Note  Date:2022       Room:[facility]  Patient Name:Leann Hayden     YOB: 1950     Age:72 y.o. Subjective    Subjective :Pt SOB and dizziness getting better. Denies bleeding. Review of Systems  Objective         Vitals Last 24 Hours:  TEMPERATURE:  Temp  Av °F (36.7 °C)  Min: 97.4 °F (36.3 °C)  Max: 98.2 °F (36.8 °C)  RESPIRATIONS RANGE: Resp  Av.8  Min: 16  Max: 36  PULSE OXIMETRY RANGE: SpO2  Av.6 %  Min: 90 %  Max: 100 %  PULSE RANGE: Pulse  Av.7  Min: 69  Max: 99  BLOOD PRESSURE RANGE: Systolic (89ZNM), RXK:901 , Min:113 , IKU:957   ; Diastolic (33PFE), WKF:84, Min:62, Max:100    I/O (24Hr): Intake/Output Summary (Last 24 hours) at 2022 1604  Last data filed at 2022 0617  Gross per 24 hour   Intake 300 ml   Output 850 ml   Net -550 ml       Objective:  Vital signs: (most recent): Blood pressure (!) 161/98, pulse 99, temperature 98.2 °F (36.8 °C), resp. rate 21, height 5' 6.14\" (1.68 m), weight 90.7 kg (199 lb 15.3 oz), SpO2 95 %.     Pt sleepy  HEENT:ALYSHA  LUNGS:CTA  HEART:RRR  ABDOMEN:Soft,NT  EXT:Edema of right LE more than left  Labs/Imaging/Diagnostics    Labs:  CBC:  Recent Labs     22   WBC 7.7 9.3 9.7   RBC 3.51* 3.90 4.05   HGB 10.4* 11.5 11.9   HCT 31.5* 34.9* 35.5   MCV 89.7 89.5 87.7   RDW 14.5 14.7* 14.4   * 129* 124*       CHEMISTRIES:  Recent Labs     22  0423 22  1900    141 141 142   K 3.3* 3.7 3.4* 3.8   * 109* 110* 112*   CO2 26 25 23 21   BUN 19 31* 26* 16   CA 8.2* 8.9 8.9 8.2*   MG  --   --   --  1.9     PT/INR:  Recent Labs     22  0312   INR 1.1       APTT:  Recent Labs     22  0940 22  0218 22  1650   APTT 66.7* 70.5* >153.0*       LIVER PROFILE:  Recent Labs     22  0218 22  1900   AST 36 109*   ALT 87* 110*       Lab Results   Component Value Date/Time ALT (SGPT) 87 (H) 07/29/2022 02:18 AM    AST (SGOT) 36 07/29/2022 02:18 AM    Alk. phosphatase 81 07/29/2022 02:18 AM    Bilirubin, direct 0.3 (H) 07/29/2022 02:18 AM    Bilirubin, total 1.0 07/29/2022 02:18 AM       Imaging Last 24 Hours:  No results found. Assessment//Plan   Active Problems:    NSTEMI (non-ST elevated myocardial infarction) ([facility] [address] (7/26/2022)    Assessment & Plan  Thrombocytopenia:Platelets coming down. Monitor. Most likely due to platelet consumption from thrombosis. Monitor. Bilateral  pulmonary emboli :moderate to severe with right ventricular strain:pt on IV heparin. S/p mechanical thrombectomy noted. D/w pt nurse. Hypercoagulable work up as out pt. H/o recent travel. Once stable check CT abdomen,pelvis to r/o malignancy. Most likely needs life long anticogulation due to severe PE unless any bleeding. Xarelto or eliquis as out pt. Anemia:Mild. Check iron studies. Monitor. Elevated troponin:Due to PE. Cardiology following. Elevated liver enzymes:repeat LFTS. r/o viral infection. Recent COVID tests negative according to pt. Improving.         Electronically signed by Emiliaon Lea MD on 7/29/2022 at 5:00 PM

## 2022-07-29 NOTE — PROGRESS NOTES
Pulmonology and Critical Care Progress Note    Subjective:     Chief Complaint:   Chief Complaint   Patient presents with    Chest Pain    Dizziness    Shortness of Breath        Patient seen and examined in her room in the ICU this morning, no acute events overnight. CBC stable, saturating well on room air, BMP with a low potassium of 3.3, LFTs improving. Continue on heparin drip, eventual transition to Eliquis at discharge. Given 40 mill equivalents oral KCl today, repeat electrolytes in the morning. Okay to transfer out of the ICU to telemetry floor from a pulmonary standpoint.   Patient has a follow-up visit with me at our [place] location on 9/13/2022 at 9:45 AM.              Current Facility-Administered Medications   Medication Dose Route Frequency Provider Last Rate Last Admin    fentaNYL citrate (PF) injection 12.5-50 mcg  12.5-50 mcg IntraVENous Multiple Tana Vasquez MD        rosuvastatin (CRESTOR) tablet 20 mg  20 mg Oral QHS Leslie Jones MD        heparin 25,000 units in D5W 250 ml infusion  12-25 Units/kg/hr (Adjusted) IntraVENous TITRATE Deshaun Katz MD 10.1 mL/hr at 07/29/22 0714 14 Units/kg/hr at 07/29/22 0714    heparin (porcine) 1,000 unit/mL injection 4,000 Units  4,000 Units IntraVENous PRN Deshaun Katz MD        Or    heparin (porcine) 1,000 unit/mL injection 2,000 Units  2,000 Units IntraVENous PRN Deshaun Katz MD   2,000 Units at 07/29/22 0359    sodium chloride (NS) flush 5-40 mL  5-40 mL IntraVENous Q8H Leslie Jones MD   5 mL at 07/29/22 0529    sodium chloride (NS) flush 5-40 mL  5-40 mL IntraVENous PRN Deshaun Katz MD        acetaminophen (TYLENOL) tablet 650 mg  650 mg Oral Q6H PRN Deshaun Katz MD   650 mg at 07/28/22 2203    Or    acetaminophen (TYLENOL) suppository 650 mg  650 mg Rectal Q6H PRN Deshaun Katz MD        polyethylene glycol (MIRALAX) packet 17 g  17 g Oral DAILY PRN Deshaun Katz MD        ondansetron (ZOFRAN ODT) tablet 4 mg  4 mg Oral Q8H PRN Deshaun Katz MD        Or    ondansetron [facility]) injection 4 mg  4 mg IntraVENous Q6H PRN Leslie Jones MD        nitroglycerin (NITROSTAT) tablet 0.4 mg  0.4 mg SubLINGual PRN Leslie Jones MD        metoprolol succinate (TOPROL-XL) XL tablet 25 mg  25 mg Oral DAILY Leslie Jones MD   25 mg at 22 5045    losartan (COZAAR) tablet 25 mg  25 mg Oral DAILY Deshaun Katz MD   25 mg at 22 8248          Allergies   Allergen Reactions    Crestor [Rosuvastatin] Other (comments)     Cramping of muscles    Lipitor [Atorvastatin] Other (comments)     Cramping of muscles    Codeine Other (comments)     Gets real dizzy and nauseated. Nsaids (Non-Steroidal Anti-Inflammatory Drug) Other (comments)     Instant h/a and stomach burning. Sulfa (Sulfonamide Antibiotics) Hives     Rash that looks like measles. Review of Systems:  A comprehensive review of systems was negative except for that written in the HPI. Objective:     Blood pressure 133/70, pulse 77, temperature 98.1 °F (36.7 °C), resp. rate 19, height 5' 6.14\" (1.68 m), weight 90.7 kg (199 lb 15.3 oz), SpO2 96 %. Temp (24hrs), Av.9 °F (36.6 °C), Min:97.4 °F (36.3 °C), Max:98.2 °F (36.8 °C)      Intake and Output:  Current Shift: No intake/output data recorded. Last 3 Shifts: 1901 -  0700  In: 300 [P.O.:300]  Out: 1375 [Urine:1375]    Physical Exam:   General appearance: alert, cooperative, no distress, appears stated age  Head: Normocephalic, without obvious abnormality, atraumatic  Eyes: negative  Neck: supple, symmetrical, trachea midline, no adenopathy, and no JVD  Lungs: clear to auscultation bilaterally, currently on room air  Heart: regular rate and rhythm, S1, S2 normal, no murmur, click, rub or gallop  Abdomen: soft, non-tender.  Bowel sounds normal. No masses,  no organomegaly  Extremities: extremities normal, atraumatic, no cyanosis or edema  Pulses: 2+ and symmetric  Skin: Skin color, texture, turgor normal. No rashes or lesions  Lymph nodes: Cervical, supraclavicular, and axillary nodes normal.  Neurologic: Grossly normal, alert and oriented x3    Additional comments:None    Lab/Data Review: All lab results for the last 24 hours reviewed. Recent Results (from the past 24 hour(s))   POC ACTIVATED CLOTTING TIME    Collection Time: 07/28/22  2:41 PM   Result Value Ref Range    Activated Clotting Time (POC) 265 (H) 74 - 125 sec    Performed by Marisa Sung    PTT    Collection Time: 07/28/22  4:50 PM   Result Value Ref Range    aPTT >153.0 (HH) 21.2 - 34.1 sec    aPTT, therapeutic range   82 - 109 sec   CBC W/O DIFF    Collection Time: 07/29/22  2:18 AM   Result Value Ref Range    WBC 7.7 3.6 - 11.0 K/uL    RBC 3.51 (L) 3.80 - 5.20 M/uL    HGB 10.4 (L) 11.5 - 16.0 g/dL    HCT 31.5 (L) 35.0 - 47.0 %    MCV 89.7 80.0 - 99.0 FL    MCH 29.6 26.0 - 34.0 PG    MCHC 33.0 30.0 - 36.5 g/dL    RDW 14.5 11.5 - 14.5 %    PLATELET 597 (L) 237 - 400 K/uL    MPV 12.8 8.9 - 12.9 FL    NRBC 0.0 0.0  WBC    ABSOLUTE NRBC 0.00 0.00 - 1.36 K/uL   METABOLIC PANEL, BASIC    Collection Time: 07/29/22  2:18 AM   Result Value Ref Range    Sodium 143 136 - 145 mmol/L    Potassium 3.3 (L) 3.5 - 5.1 mmol/L    Chloride 110 (H) 97 - 108 mmol/L    CO2 26 21 - 32 mmol/L    Anion gap 7 5 - 15 mmol/L    Glucose 93 65 - 100 mg/dL    BUN 19 6 - 20 mg/dL    Creatinine 0.60 0.55 - 1.02 mg/dL    BUN/Creatinine ratio 32 (H) 12 - 20      GFR est AA >60 >60 ml/min/1.73m2    GFR est non-AA >60 >60 ml/min/1.73m2    Calcium 8.2 (L) 8.5 - 10.1 mg/dL   HEPATIC FUNCTION PANEL    Collection Time: 07/29/22  2:18 AM   Result Value Ref Range    Protein, total 5.7 (L) 6.4 - 8.2 g/dL    Albumin 2.7 (L) 3.5 - 5.0 g/dL    Globulin 3.0 2.0 - 4.0 g/dL    A-G Ratio 0.9 (L) 1.1 - 2.2      Bilirubin, total 1.0 0.2 - 1.0 mg/dL    Bilirubin, direct 0.3 (H) 0.0 - 0.2 mg/dL    Alk.  phosphatase 81 45 - 117 U/L    AST (SGOT) 36 15 - 37 U/L    ALT (SGPT) 87 (H) 12 - 78 U/L   PTT Collection Time: 07/29/22  2:18 AM   Result Value Ref Range    aPTT 70.5 (H) 21.2 - 34.1 sec    aPTT, therapeutic range   82 - 109 sec   MRSA SCREEN - PCR (NASAL)    Collection Time: 07/29/22  6:48 AM   Result Value Ref Range    MRSA by PCR, Nasal Not Detected Not Detected     PTT    Collection Time: 07/29/22  9:40 AM   Result Value Ref Range    aPTT 66.7 (H) 21.2 - 34.1 sec    aPTT, therapeutic range   82 - 109 sec         Chest X-Ray:   DUPLEX LOWER EXT VENOUS BILAT   Final Result      CTA CHEST W OR W WO CONT   Final Result   1. Bilateral central, segmental, and subsegmental pulmonary emboli with   moderate-severe clot burden. 2.  Elevated RV/LV ratio consistent with significant right heart strain. 3.  No acute airspace disease. CT HEAD WO CONT   Final Result   No acute intracranial process. XR CHEST PORT   Final Result   No acute process. IR THROMB MECH ARTERIAL PRIM NON PAULINE OR INTRACRANIAL ADDL    (Results Pending)   IR ANGIO PULMONARY BI    (Results Pending)   IR INTRO CATH PUL ART SEG / SUBSEGMENT    (Results Pending)   IR INTRO CATH PUL ART SEG / SUBSEGMENT    (Results Pending)       CT imaging:  CT Results  (Last 48 hours)                 07/27/22 1830  CTA CHEST W OR W WO CONT Final result    Impression:  1. Bilateral central, segmental, and subsegmental pulmonary emboli with   moderate-severe clot burden. 2.  Elevated RV/LV ratio consistent with significant right heart strain. 3.  No acute airspace disease. Narrative:  EXAM:  CTA CHEST W OR W WO CONT       INDICATION: Dyspnea on exertion       COMPARISON: None. TECHNIQUE: Helical thin section chest CT following uneventful intravenous   administration of nonionic contrast 100 mL of isovue 370 according to   departmental PE protocol. Coronal and sagittal reformats were performed. 3D post   processing was performed.   CT dose reduction was achieved through the use of a   standardized protocol tailored for this examination and automatic exposure   control for dose modulation. FINDINGS: This is a good quality study for the evaluation of pulmonary embolism   to the first subsegmental arterial level. Bilateral pulmonary emboli are   identified. On the right, pulmonary emboli are seen in the main right pulmonary   artery, and segmental vessels in the upper and middle lobar arteries. On the   left, clot seen in the left main pulmonary artery, upper lobar segmental and   subsegmental vessels, lingular segmental and subsegmental vessels, and inferior   lobar segmental and subsegmental vessels. Right main pulmonary artery measures   38 mm in diameter. CHEST WALL: No axillary or supraclavicular lymphadenopathy. THYROID: No nodule. MEDIASTINUM: No mass or lymphadenopathy. LEONOR: No mass or lymphadenopathy. THORACIC AORTA: No aneurysm. HEART: There is an elevated RV LV ratio consistent with right heart strain. ESOPHAGUS: No wall thickening or dilatation. TRACHEA/BRONCHI: Patent. PLEURA: No effusion or pneumothorax. LUNGS: No nodule, mass, or airspace disease. UPPER ABDOMEN: Partially imaged. No acute pathology. BONES: No aggressive bone lesion or fracture. PFTs:   PFT Results  (Last 3 results in the past 10 years)      None              Assessment:     Patient is a 70-year-old -American female with history of obesity, hypertension, and hyperlipidemia who presented to the hospital on 7/26/2022 with worsening shortness of breath, left-sided chest pressure, and dizziness and was found to have large volume bilateral pulmonary emboli.     Plan:     1.)  Acute hypoxic respiratory failure  -Secondary to submassive acute pulmonary emboli, now improved status post thrombectomy with IR on 7/28/2022  -Currently on room air weaned off for goal sats greater than 90% on room air  -Can transfer out of the ICU today to the telemetry floor  -Lung parenchyma otherwise clear on recent CT scan of the chest  -No history of smoking  -Patient has a follow-up visit with me at our [place] location on 9/13/2022 at 9:45 AM.    2.)  Bilateral pulmonary emboli with right lower extremity DVT  -Moderate to severe clot burden in bilateral pulmonary arteries, now status post thrombectomy with adequate response  -Continue on heparin drip, likely transition to Eliquis at discharge. May need lifelong anticoagulation.  -Does have a brother with clots, recent trip to [place] in early July  -Lower extremity venous Doppler demonstrating thrombus in the right popliteal vein/posterior tibial vein  -Close follow-up with hematology in the outpatient setting, hypercoagulable work-up pending    3.)  Elevated troponin  -Troponin elevated to 341 on admission, peaked at 386, now down to 152  -Secondary to acute PE, doubt ACS  -TTE revealed an EF of 65 to 70%, abnormal diastolic function, moderately dilated RV, decreased systolic function of the RV, and moderate to severe tricuspid valve regurgitation.  -Radiology following closely, continue on heparin drip. Transition to Eliquis at discharge. 4.)  Transaminitis  -,  on admission; doubt viral hepatitis  -Suspect secondary to poor perfusion from pulmonary emboli  -Repeat LFTs this morning are normalizing    5.)  Thrombocytopenia  -Platelet count 97 on admission, suspect secondary to clot formation  -Now improving to 115 today  -Otology following closely, appreciate their assistance        CODE STATUS: Full Code    Disposition and Family: Stable to transfer to floor.     Total time spent with patient: 30 minutes      Ethel Larry DO  [facility]  7/29/2022

## 2022-07-29 NOTE — PROGRESS NOTES
[facility] Hospitalist Progress Note      Date:7/29/2022       Room:[facility]  Patient Name:Leann Hayden     YOB: 1950     Age:69 y.o.    7/26/22 admission course  [place] Side is a 67 y.o. female with PMH of hypertension, HLP presented to the ED with a chief complaint of shortness of breath started yesterday. Worsening with mild exertion, associated with dizziness and left-sided chest pressure. Chest pressure is rated 5/10. Also has LE swelling, but attributes to previous knee surgery. Otherwise denies productive cough, wheezing, fever or chills. In ED, placed on 2 L nasal cannula. Troponin elevated at 300s. EKG showed no ST changes. Hemoglobin 8.9, plt 97, no baseline to compare. 7/27/22 no new complaints, tolerating medications well  Tolerating heparin drip well  Case discussed at bedside with her cardiologist Dr Laura Tracey by telephone    7/27/22 echocardiogram  Result status: Final result     Left Ventricle: Normal left ventricular systolic function with a visually estimated EF of 65 - 70%. Left ventricle size is normal. Increased wall thickness. Normal wall motion. Abnormal diastolic function. Right Ventricle: Right ventricle is moderately dilated. Normal wall thickness. Mildly hypokinetic. Pulmonary pressure is 47 mmHg. Mitral Valve: Mild regurgitation. Tricuspid Valve: Moderate to severe regurgitation. 7/27/22 CTA CHEST  IMPRESSION  1. Bilateral central, segmental, and subsegmental pulmonary emboli with  moderate-severe clot burden. 2.  Elevated RV/LV ratio consistent with significant right heart strain. 3.  No acute airspace disease. 7/28/22 INTERVENTIONAL RADIOLOGY  This is a 67 y.o. female with submassive bilateral PE with mild right heart strain who remains hemodynamically stable however has had minimal improvement in her symptoms (chest pressure, shortness of breath) since initiating Heparin gtt. BLE venous duplex is pending.   Procedure to be performed:  PE mechanical thrombectomy  Patient to remain NPO  Plan for sedation:  moderate  Post procedure plan:  observation per protocol  Informed consent:  risks, benefits, and alternatives reviewed with the patient / family who agree to proceed  Code status:  Full Code    22 Venous duplex lower extremities  Thrombus present in the right popliteal vein. Thrombus present in the right posterior tibial vein. No evidence of deep vein thrombosis in the left lower extremity. 22 she is to undergo interventional radiology procedure today      Subjective    Subjective:  Symptoms:  Stable. No shortness of breath. Review of Systems   Respiratory:  Negative for shortness of breath. All other systems reviewed and are negative. Objective         Vitals Last 24 Hours:  TEMPERATURE:  Temp  Av °F (36.7 °C)  Min: 97.4 °F (36.3 °C)  Max: 98.2 °F (36.8 °C)  RESPIRATIONS RANGE: Resp  Av.8  Min: 16  Max: 36  PULSE OXIMETRY RANGE: SpO2  Av.6 %  Min: 90 %  Max: 100 %  PULSE RANGE: Pulse  Av.7  Min: 69  Max: 99  BLOOD PRESSURE RANGE: Systolic (59ASL), RWK:623 , Min:113 , CJY:299   ; Diastolic (97QRP), WND:61, Min:62, Max:100    I/O (24Hr): Intake/Output Summary (Last 24 hours) at 2022 1602  Last data filed at 2022 0617  Gross per 24 hour   Intake 300 ml   Output 850 ml   Net -550 ml       Objective:  General Appearance:  Comfortable. Vital signs: (most recent): Blood pressure (!) 161/98, pulse 99, temperature 98.2 °F (36.8 °C), resp. rate 21, height 5' 6.14\" (1.68 m), weight 90.7 kg (199 lb 15.3 oz), SpO2 95 %. HEENT: Normal HEENT exam.    Lungs:  Normal effort. Heart: Normal rate. Regular rhythm. Abdomen: Abdomen is soft. Hyperactive bowel sounds. Skin:  Warm and dry.     Labs/Imaging/Diagnostics    Labs:  CBC:  Recent Labs     22  0218 22  0312 22  0423   WBC 7.7 9.3 9.7   RBC 3.51* 3.90 4.05   HGB 10.4* 11.5 11.9   HCT 31.5* 34.9* 35.5   MCV 89.7 89.5 87.7 RDW 14.5 14.7* 14.4   * 129* 124*       CHEMISTRIES:  Recent Labs     07/29/22  0218 07/28/22  0312 07/27/22  0423 07/26/22  1900    141 141 142   K 3.3* 3.7 3.4* 3.8   * 109* 110* 112*   CO2 26 25 23 21   BUN 19 31* 26* 16   CA 8.2* 8.9 8.9 8.2*   MG  --   --   --  1.9     PT/INR:  Recent Labs     07/28/22  0312   INR 1.1       APTT:  Recent Labs     07/29/22  0940 07/29/22  0218 07/28/22  1650   APTT 66.7* 70.5* >153.0*       LIVER PROFILE:  Recent Labs     07/29/22 0218 07/26/22  1900   AST 36 109*   ALT 87* 110*       Lab Results   Component Value Date/Time    ALT (SGPT) 87 (H) 07/29/2022 02:18 AM    AST (SGOT) 36 07/29/2022 02:18 AM    Alk. phosphatase 81 07/29/2022 02:18 AM    Bilirubin, direct 0.3 (H) 07/29/2022 02:18 AM    Bilirubin, total 1.0 07/29/2022 02:18 AM         Assessment & Plan  7/26/22 admission course  Viola Segal is a 67 y.o. female with PMH of hypertension, HLP presented to the ED with a chief complaint of shortness of breath started yesterday. Worsening with mild exertion, associated with dizziness and left-sided chest pressure. Chest pressure is rated 5/10. Also has LE swelling, but attributes to previous knee surgery. Otherwise denies productive cough, wheezing, fever or chills. In ED, placed on 2 L nasal cannula. Troponin elevated at 300s. EKG showed no ST changes. Hemoglobin 8.9, plt 97, no baseline to compare. 7/27/22 echocardiogram  Result status: Final result     Left Ventricle: Normal left ventricular systolic function with a visually estimated EF of 65 - 70%. Left ventricle size is normal. Increased wall thickness. Normal wall motion. Abnormal diastolic function. Right Ventricle: Right ventricle is moderately dilated. Normal wall thickness. Mildly hypokinetic. Pulmonary pressure is 47 mmHg. Mitral Valve: Mild regurgitation. Tricuspid Valve: Moderate to severe regurgitation. 7/27/22 CTA CHEST  IMPRESSION  1.   Bilateral central, segmental, and subsegmental pulmonary emboli with  moderate-severe clot burden. 2.  Elevated RV/LV ratio consistent with significant right heart strain. 3.  No acute airspace disease. 7/28/22 INTERVENTIONAL RADIOLOGY  This is a 67 y.o. female with submassive bilateral PE with mild right heart strain who remains hemodynamically stable however has had minimal improvement in her symptoms (chest pressure, shortness of breath) since initiating Heparin gtt. BLE venous duplex is pending. 7/28/22 Venous duplex lower extremities  Thrombus present in the right popliteal vein. Thrombus present in the right posterior tibial vein. No evidence of deep vein thrombosis in the left lower extremity. ASSESSMENT AND PLAN    1) Venous thromboembolism with submassive pulmonary embolism.      Heparin drip   S/p IR thrombectomy 7/28/22    2) Cardiovascular issues including chest pain at admission, likely a consequence of submassive PE as above.    ==> Transfer to Tele/Floor  ==> Prefers Xarelto when Hep transitioned         # Full code  Electronically signed by Chase Gonzalez MD on 7/29/2022 at 7:51 AM

## 2022-07-29 NOTE — PROGRESS NOTES
Progress Note      7/29/2022 12:50 PM  NAME: Sydney Massey   MRN:  [id number]   Admit Diagnosis: NSTEMI (non-ST elevated myocardial infarction) [facility] [I21.4]      Subjective:   Chart reviewed. Discussed with the patient. Patient has a shortness of breath and has some improvement. She is found to have pulmonary embolism with a moderate burden of the clot. Patient had a thrombectomy performed and feels better. Review of Systems:    Symptom Y/N Comments  Symptom Y/N Comments   Fever/Chills n   Chest Pain n    Poor Appetite    Edema     Cough    Abdominal Pain n    Sputum    Joint Pain     SOB/RAHMAN y Has some improvement  Pruritis/Rash     Nausea/vomit    Other     Diarrhea         Constipation           Could NOT obtain due to:      Objective:          Physical Exam:    Last 24hrs VS reviewed since prior progress note. Most recent are:    Visit Vitals  BP (!) 113/100 (BP 1 Location: Right upper arm, BP Patient Position: At rest)   Pulse 86   Temp 98.2 °F (36.8 °C)   Resp (!) 31   Ht 5' 6.14\" (1.68 m)   Wt 90.7 kg (199 lb 15.3 oz)   SpO2 95%   BMI 32.14 kg/m²       Intake/Output Summary (Last 24 hours) at 7/29/2022 1344  Last data filed at 7/29/2022 0617  Gross per 24 hour   Intake 300 ml   Output 850 ml   Net -550 ml          General Appearance: Well developed, well nourished, alert & oriented x 3,    no acute distress. Ears/Nose/Mouth/Throat: Hearing grossly normal.  Neck: Supple. Chest: Lungs clear to auscultation bilaterally. Cardiovascular: Regular rate and rhythm, S1,S2 normal, no murmur. Abdomen: Soft, non-tender, bowel sounds are active. Extremities: No edema bilaterally. Skin: Warm and dry. []         Post-cath site without hematoma, bruit, tenderness, or thrill. Distal pulses intact.     PMH/SH reviewed - no change compared to H&P    Data Review    Telemetry: normal sinus rhythm     EKG:   []  No new EKG for review    Lab Data Personally Reviewed:    Recent Labs     07/29/22  9580 07/28/22 0312   WBC 7.7 9.3   HGB 10.4* 11.5   HCT 31.5* 34.9*   * 129*       Recent Labs     07/29/22  0940 07/29/22 0218 07/28/22  1650 07/28/22  0651 07/28/22 0312   INR  --   --   --   --  1.1   PTP  --   --   --   --  14.2   APTT 66.7* 70.5* >153.0*   < > 39.4*    < > = values in this interval not displayed. Recent Labs     07/29/22 0218 07/28/22 0312 07/27/22  0423 07/26/22  1900    141 141 142   K 3.3* 3.7 3.4* 3.8   * 109* 110* 112*   CO2 26 25 23 21   BUN 19 31* 26* 16   CREA 0.60 0.97 0.92 0.75   GLU 93 108* 136* 110*   CA 8.2* 8.9 8.9 8.2*   MG  --   --   --  1.9       No results for input(s): CPK, CKNDX, TROIQ in the last 72 hours. No lab exists for component: CPKMB  No results found for: CHOL, CHOLX, CHLST, CHOLV, HDL, HDLP, LDL, LDLC, DLDLP, TGLX, TRIGL, TRIGP, CHHD, CHHDX    Recent Labs     07/29/22 0218 07/26/22  1900   AP 81 90   TP 5.7* 6.7   ALB 2.7* 3.0*   GLOB 3.0 3.7       No results for input(s): PH, PCO2, PO2 in the last 72 hours.     Medications Personally Reviewed:    Current Facility-Administered Medications   Medication Dose Route Frequency    fentaNYL citrate (PF) injection 12.5-50 mcg  12.5-50 mcg IntraVENous Multiple    rosuvastatin (CRESTOR) tablet 20 mg  20 mg Oral QHS    heparin 25,000 units in D5W 250 ml infusion  12-25 Units/kg/hr (Adjusted) IntraVENous TITRATE    heparin (porcine) 1,000 unit/mL injection 4,000 Units  4,000 Units IntraVENous PRN    Or    heparin (porcine) 1,000 unit/mL injection 2,000 Units  2,000 Units IntraVENous PRN    sodium chloride (NS) flush 5-40 mL  5-40 mL IntraVENous Q8H    sodium chloride (NS) flush 5-40 mL  5-40 mL IntraVENous PRN    acetaminophen (TYLENOL) tablet 650 mg  650 mg Oral Q6H PRN    Or    acetaminophen (TYLENOL) suppository 650 mg  650 mg Rectal Q6H PRN    polyethylene glycol (MIRALAX) packet 17 g  17 g Oral DAILY PRN    ondansetron (ZOFRAN ODT) tablet 4 mg  4 mg Oral Q8H PRN    Or    ondansetron (ZOFRAN) injection 4 mg  4 mg IntraVENous Q6H PRN    nitroglycerin (NITROSTAT) tablet 0.4 mg  0.4 mg SubLINGual PRN    metoprolol succinate (TOPROL-XL) XL tablet 25 mg  25 mg Oral DAILY    losartan (COZAAR) tablet 25 mg  25 mg Oral DAILY             Problem List:   Patient has a pulmonary embolism. Troponin I is minimally elevated and pulmonary pressure is about 47 mmHg and right ventricle is mildly dilated with mild hypokinesis. Hypertension. Patient had a thrombectomy. Assessment/Plan:   Recommend anticoagulation. We will follow recommendation of work-up by the hematologist to evaluate for hypercoagulable state. Because she has a relatively low risk factors for pulmonary embolism I believe she should have work-up to also evaluate for occult malignancy. I will leave this work-up up to medical doctor. Discussed extensively with the patient. Thank you. []       High complexity decision making was performed in this patient at high risk for decompensation with multiple organ involvement.     Karsten Pereyra MD

## 2022-07-30 ENCOUNTER — APPOINTMENT (OUTPATIENT)
Dept: CT IMAGING | Age: 72
DRG: 163 | End: 2022-07-30
Attending: INTERNAL MEDICINE
Payer: MEDICARE

## 2022-07-30 LAB
ALBUMIN SERPL-MCNC: 2.6 G/DL (ref 3.5–5)
ALBUMIN/GLOB SERPL: 0.8 {RATIO} (ref 1.1–2.2)
ALP SERPL-CCNC: 83 U/L (ref 45–117)
ALT SERPL-CCNC: 66 U/L (ref 12–78)
ANION GAP SERPL CALC-SCNC: 9 MMOL/L (ref 5–15)
APTT PPP: 116.9 SEC (ref 21.2–34.1)
APTT PPP: 60.4 SEC (ref 21.2–34.1)
APTT PPP: 81.7 SEC (ref 21.2–34.1)
APTT PPP: 97.4 SEC (ref 21.2–34.1)
APTT PPP: >153 SEC (ref 21.2–34.1)
AST SERPL W P-5'-P-CCNC: 24 U/L (ref 15–37)
ATRIAL RATE: 88 BPM
BILIRUB DIRECT SERPL-MCNC: 0.2 MG/DL (ref 0–0.2)
BILIRUB SERPL-MCNC: 1 MG/DL (ref 0.2–1)
BUN SERPL-MCNC: 13 MG/DL (ref 6–20)
BUN/CREAT SERPL: 23 (ref 12–20)
CA-I BLD-MCNC: 8.4 MG/DL (ref 8.5–10.1)
CALCULATED R AXIS, ECG10: -178 DEGREES
CALCULATED T AXIS, ECG11: -125 DEGREES
CHLORIDE SERPL-SCNC: 110 MMOL/L (ref 97–108)
CO2 SERPL-SCNC: 22 MMOL/L (ref 21–32)
CREAT SERPL-MCNC: 0.56 MG/DL (ref 0.55–1.02)
DIAGNOSIS, 93000: NORMAL
ERYTHROCYTE [DISTWIDTH] IN BLOOD BY AUTOMATED COUNT: 14.3 % (ref 11.5–14.5)
GLOBULIN SER CALC-MCNC: 3.3 G/DL (ref 2–4)
GLUCOSE SERPL-MCNC: 94 MG/DL (ref 65–100)
HCT VFR BLD AUTO: 31.7 % (ref 35–47)
HGB BLD-MCNC: 10.4 G/DL (ref 11.5–16)
MCH RBC QN AUTO: 29.2 PG (ref 26–34)
MCHC RBC AUTO-ENTMCNC: 32.8 G/DL (ref 30–36.5)
MCV RBC AUTO: 89 FL (ref 80–99)
NRBC # BLD: 0 K/UL (ref 0–0.01)
NRBC BLD-RTO: 0 PER 100 WBC
P-R INTERVAL, ECG05: 144 MS
PLATELET # BLD AUTO: 115 K/UL (ref 150–400)
PMV BLD AUTO: 12.4 FL (ref 8.9–12.9)
POTASSIUM SERPL-SCNC: 3.4 MMOL/L (ref 3.5–5.1)
PROT SERPL-MCNC: 5.9 G/DL (ref 6.4–8.2)
Q-T INTERVAL, ECG07: 406 MS
QRS DURATION, ECG06: 84 MS
QTC CALCULATION (BEZET), ECG08: 491 MS
RBC # BLD AUTO: 3.56 M/UL (ref 3.8–5.2)
SODIUM SERPL-SCNC: 141 MMOL/L (ref 136–145)
THERAPEUTIC RANGE,PTTT: ABNORMAL SEC (ref 82–109)
VENTRICULAR RATE, ECG03: 88 BPM
WBC # BLD AUTO: 7.2 K/UL (ref 3.6–11)

## 2022-07-30 PROCEDURE — 74011250637 HC RX REV CODE- 250/637: Performed by: INTERNAL MEDICINE

## 2022-07-30 PROCEDURE — 74011250636 HC RX REV CODE- 250/636: Performed by: INTERNAL MEDICINE

## 2022-07-30 PROCEDURE — 74177 CT ABD & PELVIS W/CONTRAST: CPT

## 2022-07-30 PROCEDURE — 36415 COLL VENOUS BLD VENIPUNCTURE: CPT

## 2022-07-30 PROCEDURE — 85730 THROMBOPLASTIN TIME PARTIAL: CPT

## 2022-07-30 PROCEDURE — 80076 HEPATIC FUNCTION PANEL: CPT

## 2022-07-30 PROCEDURE — 74011000250 HC RX REV CODE- 250: Performed by: INTERNAL MEDICINE

## 2022-07-30 PROCEDURE — 65610000006 HC RM INTENSIVE CARE

## 2022-07-30 PROCEDURE — 85027 COMPLETE CBC AUTOMATED: CPT

## 2022-07-30 PROCEDURE — 74011000636 HC RX REV CODE- 636: Performed by: INTERNAL MEDICINE

## 2022-07-30 PROCEDURE — 80048 BASIC METABOLIC PNL TOTAL CA: CPT

## 2022-07-30 RX ORDER — ATORVASTATIN CALCIUM 10 MG/1
10 TABLET, FILM COATED ORAL DAILY
Status: DISCONTINUED | OUTPATIENT
Start: 2022-07-31 | End: 2022-07-30

## 2022-07-30 RX ORDER — LEVOFLOXACIN 500 MG/1
500 TABLET, FILM COATED ORAL EVERY 24 HOURS
Status: DISCONTINUED | OUTPATIENT
Start: 2022-07-30 | End: 2022-08-02 | Stop reason: HOSPADM

## 2022-07-30 RX ADMIN — IOPAMIDOL 100 ML: 755 INJECTION, SOLUTION INTRAVENOUS at 13:53

## 2022-07-30 RX ADMIN — LEVOFLOXACIN 500 MG: 500 TABLET, FILM COATED ORAL at 17:34

## 2022-07-30 RX ADMIN — HEPARIN SODIUM 18 UNITS/KG/HR: 10000 INJECTION, SOLUTION INTRAVENOUS at 23:41

## 2022-07-30 RX ADMIN — SODIUM CHLORIDE, PRESERVATIVE FREE 5 ML: 5 INJECTION INTRAVENOUS at 17:38

## 2022-07-30 RX ADMIN — ACETAMINOPHEN 650 MG: 325 TABLET, FILM COATED ORAL at 17:33

## 2022-07-30 RX ADMIN — METOPROLOL SUCCINATE 25 MG: 25 TABLET, EXTENDED RELEASE ORAL at 09:54

## 2022-07-30 RX ADMIN — SODIUM CHLORIDE, PRESERVATIVE FREE 5 ML: 5 INJECTION INTRAVENOUS at 09:54

## 2022-07-30 RX ADMIN — DIATRIZOATE MEGLUMINE AND DIATRIZOATE SODIUM 30 ML: 660; 100 LIQUID ORAL; RECTAL at 12:00

## 2022-07-30 RX ADMIN — HEPARIN SODIUM 20 UNITS/KG/HR: 10000 INJECTION, SOLUTION INTRAVENOUS at 04:01

## 2022-07-30 RX ADMIN — SODIUM CHLORIDE, PRESERVATIVE FREE 10 ML: 5 INJECTION INTRAVENOUS at 21:22

## 2022-07-30 RX ADMIN — HEPARIN SODIUM 2000 UNITS: 1000 INJECTION, SOLUTION INTRAVENOUS; SUBCUTANEOUS at 04:00

## 2022-07-30 RX ADMIN — LOSARTAN POTASSIUM 25 MG: 50 TABLET, FILM COATED ORAL at 09:54

## 2022-07-30 NOTE — PROGRESS NOTES
Problem: Falls - Risk of  Goal: *Absence of Falls  Description: Document German Adam Fall Risk and appropriate interventions in the flowsheet.   Outcome: Progressing Towards Goal  Note: Fall Risk Interventions:  Mobility Interventions: Bed/chair exit alarm         Medication Interventions: Assess postural VS orthostatic hypotension, Bed/chair exit alarm    Elimination Interventions: Call light in reach    History of Falls Interventions: Bed/chair exit alarm, Room close to nurse's station         Problem: Patient Education: Go to Patient Education Activity  Goal: Patient/Family Education  Outcome: Progressing Towards Goal

## 2022-07-30 NOTE — PROGRESS NOTES
Pulmonology and Critical Care Progress Note    Subjective:     Chief Complaint:   Chief Complaint   Patient presents with    Chest Pain    Dizziness    Shortness of Breath        Patient seen and examined in her room in the ICU, no acute events overnight. She is awake and alert. On room air. Feels much better. On IV heparin. Status post thrombectomy by IR on 7/28/2022. Okay to transfer out of the ICU to telemetry floor from a pulmonary standpoint.   Patient has a follow-up visit with me at our [place] location on 9/13/2022 at 9:45 AM.              Current Facility-Administered Medications   Medication Dose Route Frequency Provider Last Rate Last Admin    levoFLOXacin (LEVAQUIN) tablet 500 mg  500 mg Oral Q24H Karen Joseph MD        pitavastatin calcium (LIVALO) tablet 4 mg (Patient Supplied)  4 mg Oral DAILY WITH DINNER Grisel Melo MD        fentaNYL citrate (PF) injection 12.5-50 mcg  12.5-50 mcg IntraVENous Multiple Jaden Park MD        heparin 25,000 units in D5W 250 ml infusion  12-25 Units/kg/hr (Adjusted) IntraVENous TITRATE Chris Davis MD 14.4 mL/hr at 07/30/22 0401 20 Units/kg/hr at 07/30/22 0401    heparin (porcine) 1,000 unit/mL injection 4,000 Units  4,000 Units IntraVENous PRN Kary Jones MD        Or    heparin (porcine) 1,000 unit/mL injection 2,000 Units  2,000 Units IntraVENous PRN Chris Davis MD   2,000 Units at 07/30/22 0400    sodium chloride (NS) flush 5-40 mL  5-40 mL IntraVENous Q8H Kary Jones MD   5 mL at 07/30/22 0954    sodium chloride (NS) flush 5-40 mL  5-40 mL IntraVENous PRN Chris Davis MD        acetaminophen (TYLENOL) tablet 650 mg  650 mg Oral Q6H PRN Chris Davis MD   650 mg at 07/29/22 2032    Or    acetaminophen (TYLENOL) suppository 650 mg  650 mg Rectal Q6H PRN Kary Jones MD        polyethylene glycol (MIRALAX) packet 17 g  17 g Oral DAILY PRN Kary Jones MD        ondansetron (ZOFRAN ODT) tablet 4 mg  4 mg Oral Q8H PRN Karen Triston Lang MD        Or    ondansetron (ZOFRAN) injection 4 mg  4 mg IntraVENous Q6H PRN Triston Jones MD        nitroglycerin (NITROSTAT) tablet 0.4 mg  0.4 mg SubLINGual PRN Triston Jones MD        metoprolol succinate (TOPROL-XL) XL tablet 25 mg  25 mg Oral DAILY Triston Jones MD   25 mg at 22 0954    losartan (COZAAR) tablet 25 mg  25 mg Oral DAILY Rosy Woodward MD   25 mg at 22 8654          Allergies   Allergen Reactions    Crestor [Rosuvastatin] Other (comments)     Cramping of muscles    Lipitor [Atorvastatin] Other (comments)     Cramping of muscles    Codeine Other (comments)     Gets real dizzy and nauseated. Nsaids (Non-Steroidal Anti-Inflammatory Drug) Other (comments)     Instant h/a and stomach burning. Sulfa (Sulfonamide Antibiotics) Hives     Rash that looks like measles. Review of Systems:  A comprehensive review of systems was negative except for that written in the HPI. Objective:     Blood pressure 139/81, pulse 81, temperature 98.2 °F (36.8 °C), resp. rate 26, height 5' 6.14\" (1.68 m), weight 90.7 kg (199 lb 15.3 oz), SpO2 96 %. Temp (24hrs), Av.2 °F (36.8 °C), Min:98 °F (36.7 °C), Max:98.2 °F (36.8 °C)      Intake and Output:  Current Shift: No intake/output data recorded. Last 3 Shifts: 1901 -  0700  In: 1400 [P.O.:600; I.V.:800]  Out: 1650 [Urine:1650]    Physical Exam:   General appearance: alert, cooperative, no distress, appears stated age  Head: Normocephalic, without obvious abnormality, atraumatic  Eyes: negative  Neck: supple, symmetrical, trachea midline, no adenopathy, and no JVD  Lungs: clear to auscultation bilaterally, currently on room air  Heart: regular rate and rhythm, S1, S2 normal, no murmur, click, rub or gallop  Abdomen: soft, non-tender.  Bowel sounds normal. No masses,  no organomegaly  Extremities: extremities normal, atraumatic, no cyanosis or edema  Pulses: 2+ and symmetric  Skin: Skin color, texture, turgor normal. No rashes or lesions  Lymph nodes: Cervical, supraclavicular, and axillary nodes normal.  Neurologic: Grossly normal, alert and oriented x3    Additional comments:None    Lab/Data Review: All lab results for the last 24 hours reviewed. Recent Results (from the past 24 hour(s))   PTT    Collection Time: 07/29/22  5:03 PM   Result Value Ref Range    aPTT 66.0 (H) 21.2 - 34.1 sec    aPTT, therapeutic range   82 - 109 sec   CBC W/O DIFF    Collection Time: 07/30/22  2:30 AM   Result Value Ref Range    WBC 7.2 3.6 - 11.0 K/uL    RBC 3.56 (L) 3.80 - 5.20 M/uL    HGB 10.4 (L) 11.5 - 16.0 g/dL    HCT 31.7 (L) 35.0 - 47.0 %    MCV 89.0 80.0 - 99.0 FL    MCH 29.2 26.0 - 34.0 PG    MCHC 32.8 30.0 - 36.5 g/dL    RDW 14.3 11.5 - 14.5 %    PLATELET 095 (L) 497 - 400 K/uL    MPV 12.4 8.9 - 12.9 FL    NRBC 0.0 0.0  WBC    ABSOLUTE NRBC 0.00 0.00 - 0.54 K/uL   METABOLIC PANEL, BASIC    Collection Time: 07/30/22  2:30 AM   Result Value Ref Range    Sodium 141 136 - 145 mmol/L    Potassium 3.4 (L) 3.5 - 5.1 mmol/L    Chloride 110 (H) 97 - 108 mmol/L    CO2 22 21 - 32 mmol/L    Anion gap 9 5 - 15 mmol/L    Glucose 94 65 - 100 mg/dL    BUN 13 6 - 20 mg/dL    Creatinine 0.56 0.55 - 1.02 mg/dL    BUN/Creatinine ratio 23 (H) 12 - 20      GFR est AA >60 >60 ml/min/1.73m2    GFR est non-AA >60 >60 ml/min/1.73m2    Calcium 8.4 (L) 8.5 - 10.1 mg/dL   HEPATIC FUNCTION PANEL    Collection Time: 07/30/22  2:30 AM   Result Value Ref Range    Protein, total 5.9 (L) 6.4 - 8.2 g/dL    Albumin 2.6 (L) 3.5 - 5.0 g/dL    Globulin 3.3 2.0 - 4.0 g/dL    A-G Ratio 0.8 (L) 1.1 - 2.2      Bilirubin, total 1.0 0.2 - 1.0 mg/dL    Bilirubin, direct 0.2 0.0 - 0.2 mg/dL    Alk.  phosphatase 83 45 - 117 U/L    AST (SGOT) 24 15 - 37 U/L    ALT (SGPT) 66 12 - 78 U/L   PTT    Collection Time: 07/30/22  2:30 AM   Result Value Ref Range    aPTT 81.7 (H) 21.2 - 34.1 sec    aPTT, therapeutic range   82 - 109 sec   PTT    Collection Time: 07/30/22 10:00 AM   Result Value Ref Range    aPTT 97.4 (H) 21.2 - 34.1 sec    aPTT, therapeutic range   82 - 109 sec         Chest X-Ray:   CT ABD PELV W CONT   Final Result   Mild sigmoid colonic diverticulitis. There is no abscess or drainable fluid   collection. Minimal inflammatory stranding in the right inguinal region are likely   postprocedural in nature. Additional incidental/nonemergent findings are as described above. IR THROMB MECH ARTERIAL PRIM NON PAULINE OR INTRACRANIAL ADDL   Final Result      Successful aspiration thrombectomy of acute to subacute thrombus in the right   lower lobe, right main pulmonary artery, left lower lobe, and left main   pulmonary artery, with marked improvement of bilateral lower lobe perfusion post   thrombectomy. DUPLEX LOWER EXT VENOUS BILAT   Final Result      CTA CHEST W OR W WO CONT   Final Result   1. Bilateral central, segmental, and subsegmental pulmonary emboli with   moderate-severe clot burden. 2.  Elevated RV/LV ratio consistent with significant right heart strain. 3.  No acute airspace disease. CT HEAD WO CONT   Final Result   No acute intracranial process. XR CHEST PORT   Final Result   No acute process. IR ANGIO PULMONARY BI    (Results Pending)   IR INTRO CATH PUL ART SEG / SUBSEGMENT    (Results Pending)   IR INTRO CATH PUL ART SEG / SUBSEGMENT    (Results Pending)       CT imaging:  CT Results  (Last 48 hours)                 07/30/22 1352  CT ABD PELV W CONT Final result    Impression:  Mild sigmoid colonic diverticulitis. There is no abscess or drainable fluid   collection. Minimal inflammatory stranding in the right inguinal region are likely   postprocedural in nature. Additional incidental/nonemergent findings are as described above. Narrative:  EXAM: CT ABD PELV W CONT   Clinical history: PE, anemia, elevated LFTs, status post thrombolysis. INDICATION: PE,anemia. Elevated lfts       COMPARISON: 2007 CONTRAST: 100 mL of Isovue-370. ORAL CONTRAST: Was administered       TECHNIQUE:    Following the uneventful intravenous administration of contrast, thin axial   images were obtained through the abdomen and pelvis. Coronal and sagittal   reconstructions were generated. CT dose reduction was achieved through use of a   standardized protocol tailored for this examination and automatic exposure   control for dose modulation. FINDINGS:    LOWER THORAX: Small left effusion. LIVER: Hepatic steatosis. BILIARY TREE: Cholecystectomy. CBD is not dilated. SPLEEN: within normal limits. PANCREAS: No mass or ductal dilatation. ADRENALS: Unremarkable. KIDNEYS: Nonobstructive calculus on the left. Nonobstructive calculus on the   right. STOMACH: Small hiatal hernia. SMALL BOWEL: No dilatation or wall thickening. COLON: Minimal sigmoid colonic wall thickening with pericolonic inflammatory   stranding and diverticula. APPENDIX: Normal   PERITONEUM: No ascites or pneumoperitoneum. RETROPERITONEUM: There is minimal postprocedural inflammatory change in the   right inguinal region. Fusiform abdominal aortic aneurysm 35 x 34 mm in size. REPRODUCTIVE ORGANS:   URINARY BLADDER: No mass or calculus. BONES: No destructive bone lesion. ABDOMINAL WALL: No mass or hernia. ADDITIONAL COMMENTS: N/A                   PFTs:   PFT Results  (Last 3 results in the past 10 years)      None              Assessment:     Patient is a 80-year-old -American female with history of obesity, hypertension, and hyperlipidemia who presented to the hospital on 7/26/2022 with worsening shortness of breath, left-sided chest pressure, and dizziness and was found to have large volume bilateral pulmonary emboli.     Plan:     1.)  Acute hypoxic respiratory failure  -Secondary to submassive acute pulmonary emboli, now improved status post thrombectomy with IR on 7/28/2022  -Currently on room air weaned off for goal sats greater than 90% on room air  -Can transfer out of the ICU today to the telemetry floor  -Lung parenchyma otherwise clear on recent CT scan of the chest  -No history of smoking  -Patient has a follow-up visit at our [place] location on 9/13/2022 at 9:45 AM.    2.)  Bilateral pulmonary emboli with right lower extremity DVT  -Moderate to severe clot burden in bilateral pulmonary arteries, now status post thrombectomy with adequate response  -Continue on heparin drip, likely transition to Eliquis at discharge. May need lifelong anticoagulation.  -Does have a brother with clots, recent trip to [place] in early July  -Lower extremity venous Doppler demonstrating thrombus in the right popliteal vein/posterior tibial vein  -Close follow-up with hematology in the outpatient setting, hypercoagulable work-up pending    3.)  Elevated troponin  -Troponin elevated to 341 on admission, peaked at 386, now down to 152  -Secondary to acute PE, doubt ACS  -TTE revealed an EF of 65 to 70%, abnormal diastolic function, moderately dilated RV, decreased systolic function of the RV, and moderate to severe tricuspid valve regurgitation.  -Radiology following closely, continue on heparin drip. Transition to Eliquis at discharge. 4.)  Transaminitis  -,  on admission; doubt viral hepatitis  -Suspect secondary to poor perfusion from pulmonary emboli  -Repeat LFTs this morning are normalizing    5.)  Thrombocytopenia  -Platelet count 97 on admission, suspect secondary to clot formation  -Now improving to 115 today  -Hematology following closely, appreciate their assistance      CODE STATUS: Full Code    Disposition and Family: Stable to transfer to floor.     Total time spent with patient: 30 minutes      Piper Dumont MD  [facility]  7/30/2022

## 2022-07-30 NOTE — PROGRESS NOTES
Progress Note      7/30/2022 12:50 PM  NAME: Jose Falcon   MRN:  [id number]   Admit Diagnosis: NSTEMI (non-ST elevated myocardial infarction) [facility]) [I21.4]      Subjective:   Chart reviewed. Discussed with the patient. Patient has a shortness of breath and has some improvement. She is found to have pulmonary embolism with a moderate burden of the clot. Patient had a thrombectomy performed and feels better. Review of Systems:    Symptom Y/N Comments  Symptom Y/N Comments   Fever/Chills n   Chest Pain n    Poor Appetite    Edema     Cough    Abdominal Pain n    Sputum    Joint Pain     SOB/RAHMAN y Has some improvement  Pruritis/Rash     Nausea/vomit    Other     Diarrhea         Constipation           Could NOT obtain due to:      Objective:          Physical Exam:    Last 24hrs VS reviewed since prior progress note. Most recent are:    Visit Vitals  /81   Pulse 81   Temp 98.2 °F (36.8 °C)   Resp 26   Ht 5' 6.14\" (1.68 m)   Wt 90.7 kg (199 lb 15.3 oz)   SpO2 96%   BMI 32.14 kg/m²       Intake/Output Summary (Last 24 hours) at 7/30/2022 1403  Last data filed at 7/30/2022 0600  Gross per 24 hour   Intake 800.02 ml   Output 1300 ml   Net -499.98 ml          General Appearance: Well developed, well nourished, alert & oriented x 3,    no acute distress. Ears/Nose/Mouth/Throat: Hearing grossly normal.  Neck: Supple. Chest: Lungs clear to auscultation bilaterally. Cardiovascular: Regular rate and rhythm, S1,S2 normal, no murmur. Abdomen: Soft, non-tender, bowel sounds are active. Extremities: No edema bilaterally. Skin: Warm and dry. []         Post-cath site without hematoma, bruit, tenderness, or thrill. Distal pulses intact.     PMH/SH reviewed - no change compared to H&P    Data Review    Telemetry: normal sinus rhythm     EKG:   []  No new EKG for review    Lab Data Personally Reviewed:    Recent Labs     07/30/22  0230 07/29/22  0218   WBC 7.2 7.7   HGB 10.4* 10.4*   HCT 31.7* 31.5* * 115*       Recent Labs     07/30/22  1000 07/30/22  0230 07/29/22  1703 07/28/22  0651 07/28/22 0312   INR  --   --   --   --  1.1   PTP  --   --   --   --  14.2   APTT 97.4* 81.7* 66.0*   < > 39.4*    < > = values in this interval not displayed. Recent Labs     07/30/22  0230 07/29/22  0218 07/28/22 0312    143 141   K 3.4* 3.3* 3.7   * 110* 109*   CO2 22 26 25   BUN 13 19 31*   CREA 0.56 0.60 0.97   GLU 94 93 108*   CA 8.4* 8.2* 8.9       No results for input(s): CPK, CKNDX, TROIQ in the last 72 hours. No lab exists for component: CPKMB  No results found for: CHOL, CHOLX, CHLST, CHOLV, HDL, HDLP, LDL, LDLC, DLDLP, TGLX, TRIGL, TRIGP, CHHD, CHHDX    Recent Labs     07/30/22  0230 07/29/22 0218   AP 83 81   TP 5.9* 5.7*   ALB 2.6* 2.7*   GLOB 3.3 3.0       No results for input(s): PH, PCO2, PO2 in the last 72 hours.     Medications Personally Reviewed:    Current Facility-Administered Medications   Medication Dose Route Frequency    fentaNYL citrate (PF) injection 12.5-50 mcg  12.5-50 mcg IntraVENous Multiple    rosuvastatin (CRESTOR) tablet 20 mg  20 mg Oral QHS    heparin 25,000 units in D5W 250 ml infusion  12-25 Units/kg/hr (Adjusted) IntraVENous TITRATE    heparin (porcine) 1,000 unit/mL injection 4,000 Units  4,000 Units IntraVENous PRN    Or    heparin (porcine) 1,000 unit/mL injection 2,000 Units  2,000 Units IntraVENous PRN    sodium chloride (NS) flush 5-40 mL  5-40 mL IntraVENous Q8H    sodium chloride (NS) flush 5-40 mL  5-40 mL IntraVENous PRN    acetaminophen (TYLENOL) tablet 650 mg  650 mg Oral Q6H PRN    Or    acetaminophen (TYLENOL) suppository 650 mg  650 mg Rectal Q6H PRN    polyethylene glycol (MIRALAX) packet 17 g  17 g Oral DAILY PRN    ondansetron (ZOFRAN ODT) tablet 4 mg  4 mg Oral Q8H PRN    Or    ondansetron (ZOFRAN) injection 4 mg  4 mg IntraVENous Q6H PRN    nitroglycerin (NITROSTAT) tablet 0.4 mg  0.4 mg SubLINGual PRN    metoprolol succinate (TOPROL-XL) XL tablet 25 mg  25 mg Oral DAILY    losartan (COZAAR) tablet 25 mg  25 mg Oral DAILY             Problem List:   Patient has a pulmonary embolism. Troponin I is minimally elevated and pulmonary pressure is about 47 mmHg and right ventricle is mildly dilated with mild hypokinesis. Hypertension. Patient had a thrombectomy. Patient is having CT scan of abdomen and pelvis to evaluate for occult malignancy. Assessment/Plan:   Recommend anticoagulation. We will follow recommendation of work-up by the hematologist to evaluate for hypercoagulable state. Because she has a relatively low risk factors for pulmonary embolism I believe she should have work-up to also evaluate for occult malignancy. I will leave this work-up up to medical doctor. Patient is having CT scan of abdomen and pelvis for occult malignancy evaluation. Discussed extensively with the patient. Thank you. []       High complexity decision making was performed in this patient at high risk for decompensation with multiple organ involvement.     Cinda Poole MD

## 2022-07-30 NOTE — PROGRESS NOTES
Progress Note  Date:2022       Room:[facility]  Patient Name:Leann Hayden     YOB: 1950     Age:72 y.o. Subjective    Subjective :Pt feeling better. SOB and dizziness getting better. Denies bleeding. pt has mild abdominal pain. Review of Systems  Objective         Vitals Last 24 Hours:  TEMPERATURE:  Temp  Av.2 °F (36.8 °C)  Min: 98 °F (36.7 °C)  Max: 98.2 °F (36.8 °C)  RESPIRATIONS RANGE: Resp  Av.8  Min: 18  Max: 37  PULSE OXIMETRY RANGE: SpO2  Av.1 %  Min: 93 %  Max: 96 %  PULSE RANGE: Pulse  Av.8  Min: 81  Max: 97  BLOOD PRESSURE RANGE: Systolic (47ICZ), XVM:411 , Min:127 , PTK:007   ; Diastolic (79VLO), XEW:41, Min:75, Max:96    I/O (24Hr): Intake/Output Summary (Last 24 hours) at 2022 1550  Last data filed at 2022 0600  Gross per 24 hour   Intake 800.02 ml   Output 1300 ml   Net -499.98 ml       Objective:  Vital signs: (most recent): Blood pressure 139/81, pulse 81, temperature 98.2 °F (36.8 °C), resp. rate 26, height 5' 6.14\" (1.68 m), weight 90.7 kg (199 lb 15.3 oz), SpO2 96 %.     Pt sleepy  HEENT:ALYSHA  LUNGS:CTA  HEART:RRR  ABDOMEN:Soft,NT  EXT:Edema of right LE more than left  Labs/Imaging/Diagnostics    Labs:  CBC:  Recent Labs     22   WBC 7.2 7.7 9.3   RBC 3.56* 3.51* 3.90   HGB 10.4* 10.4* 11.5   HCT 31.7* 31.5* 34.9*   MCV 89.0 89.7 89.5   RDW 14.3 14.5 14.7*   * 115* 129*       CHEMISTRIES:  Recent Labs     22  02322    143 141   K 3.4* 3.3* 3.7   * 110* 109*   CO2 22 26 25   BUN 13 19 31*   CA 8.4* 8.2* 8.9     PT/INR:  Recent Labs     22  031   INR 1.1       APTT:  Recent Labs     22  1000 22  0230 22  1703   APTT 97.4* 81.7* 66.0*       LIVER PROFILE:  Recent Labs     22  0230 22  0218   AST 24 36   ALT 66 87*       Lab Results   Component Value Date/Time    ALT (SGPT) 66 07/30/2022 02:30 AM    AST (SGOT) 24 07/30/2022 02:30 AM    Alk. phosphatase 83 07/30/2022 02:30 AM    Bilirubin, direct 0.2 07/30/2022 02:30 AM    Bilirubin, total 1.0 07/30/2022 02:30 AM       Imaging Last 24 Hours:  CT ABD PELV W CONT    Result Date: 7/30/2022  EXAM: CT ABD PELV W CONT Clinical history: PE, anemia, elevated LFTs, status post thrombolysis. INDICATION: PE,anemia. Elevated lfts COMPARISON: 2007 CONTRAST: 100 mL of Isovue-370. ORAL CONTRAST: Was administered TECHNIQUE: Following the uneventful intravenous administration of contrast, thin axial images were obtained through the abdomen and pelvis. Coronal and sagittal reconstructions were generated. CT dose reduction was achieved through use of a standardized protocol tailored for this examination and automatic exposure control for dose modulation. FINDINGS: LOWER THORAX: Small left effusion. LIVER: Hepatic steatosis. BILIARY TREE: Cholecystectomy. CBD is not dilated. SPLEEN: within normal limits. PANCREAS: No mass or ductal dilatation. ADRENALS: Unremarkable. KIDNEYS: Nonobstructive calculus on the left. Nonobstructive calculus on the right. STOMACH: Small hiatal hernia. SMALL BOWEL: No dilatation or wall thickening. COLON: Minimal sigmoid colonic wall thickening with pericolonic inflammatory stranding and diverticula. APPENDIX: Normal PERITONEUM: No ascites or pneumoperitoneum. RETROPERITONEUM: There is minimal postprocedural inflammatory change in the right inguinal region. Fusiform abdominal aortic aneurysm 35 x 34 mm in size. REPRODUCTIVE ORGANS: URINARY BLADDER: No mass or calculus. BONES: No destructive bone lesion. ABDOMINAL WALL: No mass or hernia. ADDITIONAL COMMENTS: N/A     Mild sigmoid colonic diverticulitis. There is no abscess or drainable fluid collection. Minimal inflammatory stranding in the right inguinal region are likely postprocedural in nature. Additional incidental/nonemergent findings are as described above.    Assessment//Plan Active Problems:    NSTEMI (non-ST elevated myocardial infarction) ([facility] [address] (7/26/2022)    Assessment & Plan  Thrombocytopenia:Platelets stable. Monitor. Most likely due to platelet consumption from thrombosis. B12 normal.    Bilateral  pulmonary emboli :moderate to severe with right ventricular strain:pt on IV heparin. S/p mechanical thrombectomy noted. D/w pt nurse. Hypercoagulable work up as out pt. H/o recent travel. CT abdomen,pelvis negative for malignancy. Mild sigmoid colonic diverticulitis. There is no abscess or drainable fluid  collection. Start levoquin. GI consult. Pt needs life long anticogulation due to severe PE unless any bleeding. Xarelto as out pt. Anemia:Stable. Check iron studies. Monitor. Elevated troponin:Due to PE. Cardiology following. Elevated liver enzymes:Improving. Monitor.         Electronically signed by Percy Lindsay MD on 7/30/2022 at 5:00 PM

## 2022-07-30 NOTE — PROGRESS NOTES
[facility] PICC team consulted for US-guided PIV insertion. PIV inserted x 1 attempt, client tolerated well, daughter at bedside. Education completed and client verbalized understanding. Blue top tube drawn & sent from new IV site on right arm, Heparin gtt infusing on left arm. Primary care nurse, Kristina May, notified.

## 2022-07-30 NOTE — PROGRESS NOTES
[facility] Hospitalist Progress Note      Date:7/30/2022       Room:[facility]  Patient Name:Leann Hayden     YOB: 1950     Age:69 y.o.    7/26/22 admission course  Ras Tavares is a 67 y.o. female with PMH of hypertension, HLP presented to the ED with a chief complaint of shortness of breath started yesterday. Worsening with mild exertion, associated with dizziness and left-sided chest pressure. Chest pressure is rated 5/10. Also has LE swelling, but attributes to previous knee surgery. Otherwise denies productive cough, wheezing, fever or chills. In ED, placed on 2 L nasal cannula. Troponin elevated at 300s. EKG showed no ST changes. Hemoglobin 8.9, plt 97, no baseline to compare. 7/27/22 no new complaints, tolerating medications well  Tolerating heparin drip well  Case discussed at bedside with her cardiologist Dr Jeremy Mcdaniel by telephone    7/27/22 echocardiogram  Result status: Final result     Left Ventricle: Normal left ventricular systolic function with a visually estimated EF of 65 - 70%. Left ventricle size is normal. Increased wall thickness. Normal wall motion. Abnormal diastolic function. Right Ventricle: Right ventricle is moderately dilated. Normal wall thickness. Mildly hypokinetic. Pulmonary pressure is 47 mmHg. Mitral Valve: Mild regurgitation. Tricuspid Valve: Moderate to severe regurgitation. 7/27/22 CTA CHEST  IMPRESSION  1. Bilateral central, segmental, and subsegmental pulmonary emboli with  moderate-severe clot burden. 2.  Elevated RV/LV ratio consistent with significant right heart strain. 3.  No acute airspace disease. 7/28/22 INTERVENTIONAL RADIOLOGY  This is a 67 y.o. female with submassive bilateral PE with mild right heart strain who remains hemodynamically stable however has had minimal improvement in her symptoms (chest pressure, shortness of breath) since initiating Heparin gtt. BLE venous duplex is pending.   Procedure to be performed:  PE mechanical thrombectomy  Patient to remain NPO  Plan for sedation:  moderate  Post procedure plan:  observation per protocol  Informed consent:  risks, benefits, and alternatives reviewed with the patient / family who agree to proceed  Code status:  Full Code    22 Venous duplex lower extremities  Thrombus present in the right popliteal vein. Thrombus present in the right posterior tibial vein. No evidence of deep vein thrombosis in the left lower extremity. 22 she is to undergo interventional radiology procedure today      Subjective      Feels well. Got up to the bathroom. No significant CP, SOB. Objective         Vitals Last 24 Hours:  TEMPERATURE:  Temp  Av.2 °F (36.8 °C)  Min: 98 °F (36.7 °C)  Max: 98.2 °F (36.8 °C)  RESPIRATIONS RANGE: Resp  Av.8  Min: 18  Max: 37  PULSE OXIMETRY RANGE: SpO2  Av.2 %  Min: 93 %  Max: 96 %  PULSE RANGE: Pulse  Av.3  Min: 81  Max: 99  BLOOD PRESSURE RANGE: Systolic (95UVR), GGT:833 , Min:127 , XGX:022   ; Diastolic (66MAW), VJA:42, Min:74, Max:98    I/O (24Hr): Intake/Output Summary (Last 24 hours) at 2022 1220  Last data filed at 2022 0600  Gross per 24 hour   Intake 800.02 ml   Output 1300 ml   Net -499.98 ml       Objective:  General Appearance:  Comfortable. Vital signs: (most recent): Blood pressure 139/81, pulse 81, temperature 98.2 °F (36.8 °C), resp. rate 26, height 5' 6.14\" (1.68 m), weight 90.7 kg (199 lb 15.3 oz), SpO2 96 %. HEENT: Normal HEENT exam.    Lungs:  Normal effort. Heart: Normal rate. Regular rhythm. Abdomen: Abdomen is soft. Hyperactive bowel sounds. Skin:  Warm and dry.     Labs/Imaging/Diagnostics    Labs:  CBC:  Recent Labs     22  0230 22  0218 22  0312   WBC 7.2 7.7 9.3   RBC 3.56* 3.51* 3.90   HGB 10.4* 10.4* 11.5   HCT 31.7* 31.5* 34.9*   MCV 89.0 89.7 89.5   RDW 14.3 14.5 14.7*   * 115* 129*       CHEMISTRIES:  Recent Labs     22  0230 22  5526 07/28/22 0312    143 141   K 3.4* 3.3* 3.7   * 110* 109*   CO2 22 26 25   BUN 13 19 31*   CA 8.4* 8.2* 8.9     PT/INR:  Recent Labs     07/28/22 0312   INR 1.1       APTT:  Recent Labs     07/30/22  1000 07/30/22  0230 07/29/22  1703   APTT 97.4* 81.7* 66.0*       LIVER PROFILE:  Recent Labs     07/30/22  0230 07/29/22  0218   AST 24 36   ALT 66 87*       Lab Results   Component Value Date/Time    ALT (SGPT) 66 07/30/2022 02:30 AM    AST (SGOT) 24 07/30/2022 02:30 AM    Alk. phosphatase 83 07/30/2022 02:30 AM    Bilirubin, direct 0.2 07/30/2022 02:30 AM    Bilirubin, total 1.0 07/30/2022 02:30 AM         Assessment & Plan  7/26/22 admission course  Ashlyn Palm is a 67 y.o. female with PMH of hypertension, HLP presented to the ED with a chief complaint of shortness of breath started yesterday. Worsening with mild exertion, associated with dizziness and left-sided chest pressure. Chest pressure is rated 5/10. Also has LE swelling, but attributes to previous knee surgery. Otherwise denies productive cough, wheezing, fever or chills. In ED, placed on 2 L nasal cannula. Troponin elevated at 300s. EKG showed no ST changes. Hemoglobin 8.9, plt 97, no baseline to compare. 7/27/22 echocardiogram  Result status: Final result     Left Ventricle: Normal left ventricular systolic function with a visually estimated EF of 65 - 70%. Left ventricle size is normal. Increased wall thickness. Normal wall motion. Abnormal diastolic function. Right Ventricle: Right ventricle is moderately dilated. Normal wall thickness. Mildly hypokinetic. Pulmonary pressure is 47 mmHg. Mitral Valve: Mild regurgitation. Tricuspid Valve: Moderate to severe regurgitation. 7/27/22 CTA CHEST  IMPRESSION  1. Bilateral central, segmental, and subsegmental pulmonary emboli with  moderate-severe clot burden. 2.  Elevated RV/LV ratio consistent with significant right heart strain.   3.  No acute airspace disease. 7/28/22 INTERVENTIONAL RADIOLOGY  This is a 67 y.o. female with submassive bilateral PE with mild right heart strain who remains hemodynamically stable however has had minimal improvement in her symptoms (chest pressure, shortness of breath) since initiating Heparin gtt. BLE venous duplex is pending. 7/28/22 Venous duplex lower extremities  Thrombus present in the right popliteal vein. Thrombus present in the right posterior tibial vein. No evidence of deep vein thrombosis in the left lower extremity. ASSESSMENT AND PLAN    1) Venous thromboembolism with submassive pulmonary embolism.      Heparin drip   S/p IR thrombectomy 7/28/22    2) Cardiovascular issues including chest pain at admission, likely a consequence of submassive PE as above.    ==> Transfer to Tele/Floor  ==> Prefers Xarelto when Hep transitioned         # Full code  Electronically signed by Remedios Delgado MD on 7/30/2022 at 7:51 AM

## 2022-07-31 LAB
ANION GAP SERPL CALC-SCNC: 6 MMOL/L (ref 5–15)
APTT PPP: 75.8 SEC (ref 21.2–34.1)
APTT PPP: 96.2 SEC (ref 21.2–34.1)
BUN SERPL-MCNC: 11 MG/DL (ref 6–20)
BUN/CREAT SERPL: 21 (ref 12–20)
CA-I BLD-MCNC: 8.2 MG/DL (ref 8.5–10.1)
CHLORIDE SERPL-SCNC: 111 MMOL/L (ref 97–108)
CO2 SERPL-SCNC: 23 MMOL/L (ref 21–32)
CREAT SERPL-MCNC: 0.52 MG/DL (ref 0.55–1.02)
ERYTHROCYTE [DISTWIDTH] IN BLOOD BY AUTOMATED COUNT: 14.6 % (ref 11.5–14.5)
GLUCOSE SERPL-MCNC: 117 MG/DL (ref 65–100)
HCT VFR BLD AUTO: 31.4 % (ref 35–47)
HGB BLD-MCNC: 10.3 G/DL (ref 11.5–16)
MCH RBC QN AUTO: 29.2 PG (ref 26–34)
MCHC RBC AUTO-ENTMCNC: 32.8 G/DL (ref 30–36.5)
MCV RBC AUTO: 89 FL (ref 80–99)
NRBC # BLD: 0 K/UL (ref 0–0.01)
NRBC BLD-RTO: 0 PER 100 WBC
PLATELET # BLD AUTO: 124 K/UL (ref 150–400)
PMV BLD AUTO: 12.6 FL (ref 8.9–12.9)
POTASSIUM SERPL-SCNC: 3.6 MMOL/L (ref 3.5–5.1)
POTASSIUM SERPL-SCNC: ABNORMAL MMOL/L (ref 3.5–5.1)
RBC # BLD AUTO: 3.53 M/UL (ref 3.8–5.2)
SODIUM SERPL-SCNC: 140 MMOL/L (ref 136–145)
THERAPEUTIC RANGE,PTTT: ABNORMAL SEC (ref 82–109)
THERAPEUTIC RANGE,PTTT: ABNORMAL SEC (ref 82–109)
WBC # BLD AUTO: 6.9 K/UL (ref 3.6–11)

## 2022-07-31 PROCEDURE — 74011250637 HC RX REV CODE- 250/637: Performed by: INTERNAL MEDICINE

## 2022-07-31 PROCEDURE — 85730 THROMBOPLASTIN TIME PARTIAL: CPT

## 2022-07-31 PROCEDURE — 74011000250 HC RX REV CODE- 250: Performed by: INTERNAL MEDICINE

## 2022-07-31 PROCEDURE — 85027 COMPLETE CBC AUTOMATED: CPT

## 2022-07-31 PROCEDURE — 74011250636 HC RX REV CODE- 250/636: Performed by: INTERNAL MEDICINE

## 2022-07-31 PROCEDURE — 36415 COLL VENOUS BLD VENIPUNCTURE: CPT

## 2022-07-31 PROCEDURE — 84132 ASSAY OF SERUM POTASSIUM: CPT

## 2022-07-31 PROCEDURE — 65270000032 HC RM SEMIPRIVATE

## 2022-07-31 RX ORDER — METOPROLOL SUCCINATE 25 MG/1
50 TABLET, EXTENDED RELEASE ORAL DAILY
Status: DISCONTINUED | OUTPATIENT
Start: 2022-08-01 | End: 2022-08-02 | Stop reason: HOSPADM

## 2022-07-31 RX ORDER — METOPROLOL SUCCINATE 25 MG/1
25 TABLET, EXTENDED RELEASE ORAL ONCE
Status: COMPLETED | OUTPATIENT
Start: 2022-07-31 | End: 2022-07-31

## 2022-07-31 RX ADMIN — LEVOFLOXACIN 500 MG: 500 TABLET, FILM COATED ORAL at 15:48

## 2022-07-31 RX ADMIN — SODIUM CHLORIDE, PRESERVATIVE FREE 10 ML: 5 INJECTION INTRAVENOUS at 05:48

## 2022-07-31 RX ADMIN — SODIUM CHLORIDE, PRESERVATIVE FREE 10 ML: 5 INJECTION INTRAVENOUS at 20:20

## 2022-07-31 RX ADMIN — METOPROLOL SUCCINATE 25 MG: 25 TABLET, EXTENDED RELEASE ORAL at 09:40

## 2022-07-31 RX ADMIN — RIVAROXABAN 15 MG: 15 TABLET, FILM COATED ORAL at 20:20

## 2022-07-31 RX ADMIN — METOPROLOL SUCCINATE 25 MG: 25 TABLET, EXTENDED RELEASE ORAL at 15:49

## 2022-07-31 RX ADMIN — HEPARIN SODIUM 2000 UNITS: 1000 INJECTION, SOLUTION INTRAVENOUS; SUBCUTANEOUS at 14:23

## 2022-07-31 RX ADMIN — SODIUM CHLORIDE, PRESERVATIVE FREE 10 ML: 5 INJECTION INTRAVENOUS at 14:28

## 2022-07-31 RX ADMIN — LOSARTAN POTASSIUM 25 MG: 50 TABLET, FILM COATED ORAL at 09:40

## 2022-07-31 NOTE — PROGRESS NOTES
Progress Note      7/31/2022 12:50 PM  NAME: Rome Lucero   MRN:  [id number]   Admit Diagnosis: [facility]STEM[facility] (non-ST elevated [facility] [I21.4]      Subjective:   Chart reviewed. Discussed with the patient. Patient has a shortness of breath and has some improvement. She is found to have pulmonary embolism with a moderate burden of the clot. Patient had a thrombectomy performed and feels better. Review of Systems:    Symptom Y/N Comments  Symptom Y/N Comments   Fever/Chills n   Chest Pain n    Poor Appetite    Edema     Cough    Abdominal Pain n    Sputum    Joint Pain     SOB/RAHMAN y Has significant improvement  Pruritis/Rash     Nausea/vomit    Other     Diarrhea         Constipation           Could NOT obtain due to:      Objective:          Physical Exam:    Last 24hrs VS reviewed since prior progress note. Most recent are:    Visit Vitals  /87   Pulse 81   Temp 98.5 °F (36.9 °C)   Resp 25   Ht 5' 6.14\" (1.68 m)   Wt 90.7 kg (199 lb 15.3 oz)   SpO2 95%   BMI 32.14 kg/m²       Intake/Output Summary (Last 24 hours) at 7/31/2022 1109  Last data filed at 7/31/2022 1014  Gross per 24 hour   Intake --   Output 900 ml   Net -900 ml          General Appearance: Well developed, well nourished, alert & oriented x 3,    no acute distress. Ears/Nose/Mouth/Throat: Hearing grossly normal.  Neck: Supple. Chest: Lungs clear to auscultation bilaterally. Cardiovascular: Regular rate and rhythm, S1,S2 normal, no murmur. Abdomen: Soft, non-tender, bowel sounds are active. Extremities: No edema bilaterally. Skin: Warm and dry. []         Post-cath site without hematoma, bruit, tenderness, or thrill. Distal pulses intact.     PMH/SH reviewed - no change compared to H&P    Data Review    Telemetry: normal sinus rhythm     EKG:   []  No new EKG for review    Lab Data Personally Reviewed:    Recent Labs     07/31/22  0547 07/30/22  0230   WBC 6.9 7.2   HGB 10.3* 10.4*   HCT 31.4* 31.7*   PLT 124* 115*       Recent Labs     07/31/22  0547 07/30/22  2258 07/30/22 2127   APTT 96.2* 116.9* >153.0*        Recent Labs     07/31/22  0800 07/31/22  0547 07/30/22  0230 07/29/22  0218   NA  --  140 141 143   K 3.6 Hemolyzed, recollect requested 3.4* 3.3*   CL  --  111* 110* 110*   CO2  --  23 22 26   BUN  --  11 13 19   CREA  --  0.52* 0.56 0.60   GLU  --  117* 94 93   CA  --  8.2* 8.4* 8.2*       No results for input(s): CPK, CKNDX, TROIQ in the last 72 hours. No lab exists for component: CPKMB  No results found for: CHOL, CHOLX, CHLST, CHOLV, HDL, HDLP, LDL, LDLC, DLDLP, TGLX, TRIGL, TRIGP, CHHD, CHHDX    Recent Labs     07/30/22  0230 07/29/22  0218   AP 83 81   TP 5.9* 5.7*   ALB 2.6* 2.7*   GLOB 3.3 3.0       No results for input(s): PH, PCO2, PO2 in the last 72 hours.     Medications Personally Reviewed:    Current Facility-Administered Medications   Medication Dose Route Frequency    [START ON 8/1/2022] metoprolol succinate (TOPROL-XL) XL tablet 50 mg  50 mg Oral DAILY    levoFLOXacin (LEVAQUIN) tablet 500 mg  500 mg Oral Q24H    pitavastatin calcium (LIVALO) tablet 4 mg (Patient Supplied)  4 mg Oral DAILY WITH DINNER    fentaNYL citrate (PF) injection 12.5-50 mcg  12.5-50 mcg IntraVENous Multiple    heparin 25,000 units in D5W 250 ml infusion  12-25 Units/kg/hr (Adjusted) IntraVENous TITRATE    heparin (porcine) 1,000 unit/mL injection 4,000 Units  4,000 Units IntraVENous PRN    Or    heparin (porcine) 1,000 unit/mL injection 2,000 Units  2,000 Units IntraVENous PRN    sodium chloride (NS) flush 5-40 mL  5-40 mL IntraVENous Q8H    sodium chloride (NS) flush 5-40 mL  5-40 mL IntraVENous PRN    acetaminophen (TYLENOL) tablet 650 mg  650 mg Oral Q6H PRN    Or    acetaminophen (TYLENOL) suppository 650 mg  650 mg Rectal Q6H PRN    polyethylene glycol (MIRALAX) packet 17 g  17 g Oral DAILY PRN    ondansetron (ZOFRAN ODT) tablet 4 mg  4 mg Oral Q8H PRN    Or    ondansetron (ZOFRAN) injection 4 mg  4 mg IntraVENous Q6H PRN    nitroglycerin (NITROSTAT) tablet 0.4 mg  0.4 mg SubLINGual PRN    losartan (COZAAR) tablet 25 mg  25 mg Oral DAILY             Problem List:   Patient has a pulmonary embolism. Troponin I is minimally elevated and pulmonary pressure is about 47 mmHg and right ventricle is mildly dilated with mild hypokinesis. Hypertension. Patient had a thrombectomy. Patient has a small abdominal aortic aneurysm. Assessment/Plan:   Recommend anticoagulation. We will follow recommendation of work-up by the hematologist to evaluate for hypercoagulable state. Discussed extensively with the patient. Patient can be started on oral anticoagulation and can be transferred out of ICU. Thank you. []       High complexity decision making was performed in this patient at high risk for decompensation with multiple organ involvement.     Pillo Wynn MD

## 2022-07-31 NOTE — PROGRESS NOTES
Pulmonology and Critical Care Progress Note    Subjective:     Patient seen and examined in her room in the ICU, no acute events overnight. She is awake and alert. On room air. Feels much better. Has been out of bed to chair. No sputum production. On IV heparin. Status post thrombectomy by IR on 7/28/2022. Okay to transfer out of the ICU to telemetry floor from a pulmonary standpoint.   Patient has a follow-up visit with me at our [place] location on 9/13/2022 at 9:45 AM.          Current Facility-Administered Medications   Medication Dose Route Frequency Provider Last Rate Last Admin    [START ON 8/1/2022] metoprolol succinate (TOPROL-XL) XL tablet 50 mg  50 mg Oral DAILY Mellisa Elliott MD        metoprolol succinate (TOPROL-XL) XL tablet 25 mg  25 mg Oral ONCE Mellisa Elliott MD        levoFLOXacin (LEVAQUIN) tablet 500 mg  500 mg Oral Q24H Myranda Ribeiro MD   500 mg at 07/30/22 1734    pitavastatin calcium (LIVALO) tablet 4 mg (Patient Supplied)  4 mg Oral DAILY WITH DINNER Mellisa Elliott MD   4 mg at 07/30/22 1734    fentaNYL citrate (PF) injection 12.5-50 mcg  12.5-50 mcg IntraVENous Multiple Radha Sadler MD        heparin 25,000 units in D5W 250 ml infusion  12-25 Units/kg/hr (Adjusted) IntraVENous TITRATE Miguel Zelaya MD 14.4 mL/hr at 07/31/22 1423 20 Units/kg/hr at 07/31/22 1423    heparin (porcine) 1,000 unit/mL injection 4,000 Units  4,000 Units IntraVENous PRN Migeul Zelaya MD        Or    heparin (porcine) 1,000 unit/mL injection 2,000 Units  2,000 Units IntraVENous PRN Miguel Zelaya MD   2,000 Units at 07/31/22 1423    sodium chloride (NS) flush 5-40 mL  5-40 mL IntraVENous Constance Post MD   10 mL at 07/31/22 1428    sodium chloride (NS) flush 5-40 mL  5-40 mL IntraVENous PRN Miguel Zelaya MD        acetaminophen (TYLENOL) tablet 650 mg  650 mg Oral Q6H PRN Miguel Zelaya MD   650 mg at 07/30/22 1733    Or    acetaminophen (TYLENOL) suppository 650 mg  650 mg Rectal Q6H PRN Triston Jones MD        polyethylene glycol (MIRALAX) packet 17 g  17 g Oral DAILY PRN Triston Jones MD        ondansetron (ZOFRAN ODT) tablet 4 mg  4 mg Oral Q8H PRN Triston Jones MD        Or    ondansetron (ZOFRAN) injection 4 mg  4 mg IntraVENous Q6H PRN Triston Jones MD        nitroglycerin (NITROSTAT) tablet 0.4 mg  0.4 mg SubLINGual PRN Rosy Woodward MD        losartan (COZAAR) tablet 25 mg  25 mg Oral DAILY Triston Jones MD   25 mg at 22 0940          Allergies   Allergen Reactions    Crestor [Rosuvastatin] Other (comments)     Cramping of muscles    Lipitor [Atorvastatin] Other (comments)     Cramping of muscles    Codeine Other (comments)     Gets real dizzy and nauseated. Nsaids (Non-Steroidal Anti-Inflammatory Drug) Other (comments)     Instant h/a and stomach burning. Sulfa (Sulfonamide Antibiotics) Hives     Rash that looks like measles. Review of Systems:  A comprehensive review of systems was negative except for that written in the HPI. Objective:     Blood pressure (!) 180/79, pulse 89, temperature 98.5 °F (36.9 °C), resp. rate 23, height 5' 6.14\" (1.68 m), weight 90.7 kg (199 lb 15.3 oz), SpO2 98 %. Temp (24hrs), Av.3 °F (36.8 °C), Min:98.2 °F (36.8 °C), Max:98.5 °F (36.9 °C)      Intake and Output:  Current Shift: 701 - 1900  In: -   Out: 400 [Urine:400]  Last 3 Shifts: 1901 - 700  In: 800 [I.V.:800]  Out: 1400 [Urine:1400]    Physical Exam:   General appearance: alert, cooperative, no distress, appears stated age  Head: Normocephalic, without obvious abnormality, atraumatic  Eyes: negative  Neck: supple, symmetrical, trachea midline, no adenopathy, and no JVD  Lungs: clear to auscultation bilaterally, currently on room air  Heart: regular rate and rhythm, S1, S2 normal, no murmur, click, rub or gallop  Abdomen: soft, non-tender.  Bowel sounds normal. No masses,  no organomegaly  Extremities: extremities normal, atraumatic, no cyanosis or edema  Pulses: 2+ and symmetric  Skin: Skin color, texture, turgor normal. No rashes or lesions  Lymph nodes: Cervical, supraclavicular, and axillary nodes normal.  Neurologic: Grossly normal, alert and oriented x3    Additional comments:None    Lab/Data Review: All lab results for the last 24 hours reviewed.   Recent Results (from the past 24 hour(s))   PTT    Collection Time: 07/30/22  4:50 PM   Result Value Ref Range    aPTT 60.4 (H) 21.2 - 34.1 sec    aPTT, therapeutic range   82 - 109 sec   PTT    Collection Time: 07/30/22  9:27 PM   Result Value Ref Range    aPTT >153.0 (HH) 21.2 - 34.1 sec    aPTT, therapeutic range   82 - 109 sec   PTT    Collection Time: 07/30/22 10:58 PM   Result Value Ref Range    aPTT 116.9 (HH) 21.2 - 34.1 sec    aPTT, therapeutic range   82 - 109 sec   CBC W/O DIFF    Collection Time: 07/31/22  5:47 AM   Result Value Ref Range    WBC 6.9 3.6 - 11.0 K/uL    RBC 3.53 (L) 3.80 - 5.20 M/uL    HGB 10.3 (L) 11.5 - 16.0 g/dL    HCT 31.4 (L) 35.0 - 47.0 %    MCV 89.0 80.0 - 99.0 FL    MCH 29.2 26.0 - 34.0 PG    MCHC 32.8 30.0 - 36.5 g/dL    RDW 14.6 (H) 11.5 - 14.5 %    PLATELET 352 (L) 706 - 400 K/uL    MPV 12.6 8.9 - 12.9 FL    NRBC 0.0 0.0  WBC    ABSOLUTE NRBC 0.00 0.00 - 4.93 K/uL   METABOLIC PANEL, BASIC    Collection Time: 07/31/22  5:47 AM   Result Value Ref Range    Sodium 140 136 - 145 mmol/L    Potassium Hemolyzed, recollect requested 3.5 - 5.1 mmol/L    Chloride 111 (H) 97 - 108 mmol/L    CO2 23 21 - 32 mmol/L    Anion gap 6 5 - 15 mmol/L    Glucose 117 (H) 65 - 100 mg/dL    BUN 11 6 - 20 mg/dL    Creatinine 0.52 (L) 0.55 - 1.02 mg/dL    BUN/Creatinine ratio 21 (H) 12 - 20      GFR est AA >60 >60 ml/min/1.73m2    GFR est non-AA >60 >60 ml/min/1.73m2    Calcium 8.2 (L) 8.5 - 10.1 mg/dL   PTT    Collection Time: 07/31/22  5:47 AM   Result Value Ref Range    aPTT 96.2 (H) 21.2 - 34.1 sec    aPTT, therapeutic range   82 - 109 sec   POTASSIUM    Collection Time: 07/31/22  8:00 AM   Result Value Ref Range    Potassium 3.6 3.5 - 5.1 mmol/L   PTT    Collection Time: 07/31/22  1:20 PM   Result Value Ref Range    aPTT 75.8 (H) 21.2 - 34.1 sec    aPTT, therapeutic range   82 - 109 sec         Chest X-Ray:   CT ABD PELV W CONT   Final Result   Mild sigmoid colonic diverticulitis. There is no abscess or drainable fluid   collection. Minimal inflammatory stranding in the right inguinal region are likely   postprocedural in nature. Additional incidental/nonemergent findings are as described above. IR THROMB MECH ARTERIAL PRIM NON PAULINE OR INTRACRANIAL ADDL   Final Result      Successful aspiration thrombectomy of acute to subacute thrombus in the right   lower lobe, right main pulmonary artery, left lower lobe, and left main   pulmonary artery, with marked improvement of bilateral lower lobe perfusion post   thrombectomy. DUPLEX LOWER EXT VENOUS BILAT   Final Result      CTA CHEST W OR W WO CONT   Final Result   1. Bilateral central, segmental, and subsegmental pulmonary emboli with   moderate-severe clot burden. 2.  Elevated RV/LV ratio consistent with significant right heart strain. 3.  No acute airspace disease. CT HEAD WO CONT   Final Result   No acute intracranial process. XR CHEST PORT   Final Result   No acute process. IR ANGIO PULMONARY BI    (Results Pending)   IR INTRO CATH PUL ART SEG / SUBSEGMENT    (Results Pending)   IR INTRO CATH PUL ART SEG / SUBSEGMENT    (Results Pending)       CT imaging:  CT Results  (Last 48 hours)                 07/30/22 1352  CT ABD PELV W CONT Final result    Impression:  Mild sigmoid colonic diverticulitis. There is no abscess or drainable fluid   collection. Minimal inflammatory stranding in the right inguinal region are likely   postprocedural in nature. Additional incidental/nonemergent findings are as described above.        Narrative:  EXAM: CT ABD PELV W CONT   Clinical history: PE, anemia, elevated LFTs, status post thrombolysis. INDICATION: PE,anemia. Elevated lfts       COMPARISON: 2007        CONTRAST: 100 mL of Isovue-370. ORAL CONTRAST: Was administered       TECHNIQUE:    Following the uneventful intravenous administration of contrast, thin axial   images were obtained through the abdomen and pelvis. Coronal and sagittal   reconstructions were generated. CT dose reduction was achieved through use of a   standardized protocol tailored for this examination and automatic exposure   control for dose modulation. FINDINGS:    LOWER THORAX: Small left effusion. LIVER: Hepatic steatosis. BILIARY TREE: Cholecystectomy. CBD is not dilated. SPLEEN: within normal limits. PANCREAS: No mass or ductal dilatation. ADRENALS: Unremarkable. KIDNEYS: Nonobstructive calculus on the left. Nonobstructive calculus on the   right. STOMACH: Small hiatal hernia. SMALL BOWEL: No dilatation or wall thickening. COLON: Minimal sigmoid colonic wall thickening with pericolonic inflammatory   stranding and diverticula. APPENDIX: Normal   PERITONEUM: No ascites or pneumoperitoneum. RETROPERITONEUM: There is minimal postprocedural inflammatory change in the   right inguinal region. Fusiform abdominal aortic aneurysm 35 x 34 mm in size. REPRODUCTIVE ORGANS:   URINARY BLADDER: No mass or calculus. BONES: No destructive bone lesion. ABDOMINAL WALL: No mass or hernia. ADDITIONAL COMMENTS: N/A                   PFTs:   PFT Results  (Last 3 results in the past 10 years)      None              Assessment:     Patient is a 68-year-old -American female with history of obesity, hypertension, and hyperlipidemia who presented to the hospital on 7/26/2022 with worsening shortness of breath, left-sided chest pressure, and dizziness and was found to have large volume bilateral pulmonary emboli.     Plan:     1.)  Acute hypoxic respiratory failure  -Secondary to submassive acute pulmonary emboli, now improved status post thrombectomy with IR on 7/28/2022  -Currently on room air, weaned off for goal sats greater than 90% on room air  -Can transfer out of the ICU today to the telemetry floor  -Lung parenchyma otherwise clear on recent CT scan of the chest  -No history of smoking  -Patient has a follow-up visit at our [place] location on 9/13/2022 at 9:45 AM.    2.)  Bilateral pulmonary emboli with right lower extremity DVT  -Moderate to severe clot burden in bilateral pulmonary arteries, now status post thrombectomy with adequate response  -Continue on heparin drip, likely transition to Eliquis at discharge. May need lifelong anticoagulation.  -Does have a brother with clots, recent trip to [place] in early July  -Lower extremity venous Doppler demonstrating thrombus in the right popliteal vein/posterior tibial vein  -Close follow-up with hematology in the outpatient setting, hypercoagulable work-up pending    3.)  Elevated troponin  -Troponin elevated to 341 on admission, peaked at 386, now down to 152  -Secondary to acute PE, doubt ACS  -TTE revealed an EF of 65 to 70%, abnormal diastolic function, moderately dilated RV, decreased systolic function of the RV, and moderate to severe tricuspid valve regurgitation.  -Radiology following closely, continue on heparin drip. Transition to Xarelto at discharge. 4.)  Transaminitis  -,  on admission; doubt viral hepatitis  -Suspect secondary to poor perfusion from pulmonary emboli  -Repeat LFTs are normalizing    5.)  Thrombocytopenia  -Platelet count 97 on admission, suspect secondary to clot formation  -Now improving to 124 today  -Hematology following closely, appreciate their assistance      CODE STATUS: Full Code    Disposition and Family: Stable to transfer to floor.     Total time spent with patient: 30 minutes      Piper Dumont MD  [facility]  7/31/2022

## 2022-07-31 NOTE — PROGRESS NOTES
[facility]  receiving [facility]. Pt transported. No signs distress on remote tele.   Confirmed heparin rate and next ptt with RN at bedside

## 2022-07-31 NOTE — PROGRESS NOTES
Progress Note  Date:2022       [facility]  Patient Name:Leann Hayden     YOB: 1950     Age:72 y.o. Subjective    Subjective :Pt feeling better. SOB and dizziness getting better. Denies bleeding. pt has mild abdominal pain. No new complaints. Review of Systems  Objective         Vitals Last 24 Hours:  TEMPERATURE:  Temp  Av.4 °F (36.9 °C)  Min: 98.2 °F (36.8 °C)  Max: 98.5 °F (36.9 °C)  RESPIRATIONS RANGE: Resp  Av.9  Min: 17  Max: 38  PULSE OXIMETRY RANGE: SpO2  Av.8 %  Min: 94 %  Max: 98 %  PULSE RANGE: Pulse  Av.7  Min: 78  Max: 99  BLOOD PRESSURE RANGE: Systolic (32DPF), UTJ:549 , Min:115 , XTA:905   ; Diastolic (73HJD), SGH:73, Min:74, Max:96    I/O (24Hr): Intake/Output Summary (Last 24 hours) at 2022 1257  Last data filed at 2022 0711  Gross per 24 hour   Intake --   Output 900 ml   Net -900 ml       Objective:  Vital signs: (most recent): Blood pressure 130/87, pulse 81, temperature 98.5 °F (36.9 °C), resp. rate 25, height 5' 6.14\" (1.68 m), weight 90.7 kg (199 lb 15.3 oz), SpO2 95 %. Pt sleepy  HEENT:ALYSHA  LUNGS:CTA  HEART:RRR  ABDOMEN:Soft,NT  EXT:Edema of right LE more than left  Labs/Imaging/Diagnostics    Labs:  CBC:  Recent Labs     22  0547 22  0230 22  0218   WBC 6.9 7.2 7.7   RBC 3.53* 3.56* 3.51*   HGB 10.3* 10.4* 10.4*   HCT 31.4* 31.7* 31.5*   MCV 89.0 89.0 89.7   RDW 14.6* 14.3 14.5   * 115* 115*       CHEMISTRIES:  Recent Labs     22  0800 22  0547 22  0230 22  0218   NA  --  140 141 143   K 3.6 Hemolyzed, recollect requested 3.4* 3.3*   CL  --  111* 110* 110*   CO2  --  23 22 26   BUN  --  11 13 19   CA  --  8.2* 8.4* 8.2*     PT/INR:  No results for input(s): INR, INREXT, INREXT in the last 72 hours.     No lab exists for component: PROTIME    APTT:  Recent Labs     22  0547 22  2258 227   APTT 96.2* 116.9* >153.0*       LIVER PROFILE:  Recent Labs     07/30/22  0230 07/29/22  0218   AST 24 36   ALT 66 87*       Lab Results   Component Value Date/Time    ALT (SGPT) 66 07/30/2022 02:30 AM    AST (SGOT) 24 07/30/2022 02:30 AM    Alk. phosphatase 83 07/30/2022 02:30 AM    Bilirubin, direct 0.2 07/30/2022 02:30 AM    Bilirubin, total 1.0 07/30/2022 02:30 AM       Imaging Last 24 Hours:  CT ABD PELV W CONT    Result Date: 7/30/2022  EXAM: CT ABD PELV W CONT Clinical history: PE, anemia, elevated LFTs, status post thrombolysis. INDICATION: PE,anemia. Elevated lfts COMPARISON: 2007 CONTRAST: 100 mL of Isovue-370. ORAL CONTRAST: Was administered TECHNIQUE: Following the uneventful intravenous administration of contrast, thin axial images were obtained through the abdomen and pelvis. Coronal and sagittal reconstructions were generated. CT dose reduction was achieved through use of a standardized protocol tailored for this examination and automatic exposure control for dose modulation. FINDINGS: LOWER THORAX: Small left effusion. LIVER: Hepatic steatosis. BILIARY TREE: Cholecystectomy. CBD is not dilated. SPLEEN: within normal limits. PANCREAS: No mass or ductal dilatation. ADRENALS: Unremarkable. KIDNEYS: Nonobstructive calculus on the left. Nonobstructive calculus on the right. STOMACH: Small hiatal hernia. SMALL BOWEL: No dilatation or wall thickening. COLON: Minimal sigmoid colonic wall thickening with pericolonic inflammatory stranding and diverticula. APPENDIX: Normal PERITONEUM: No ascites or pneumoperitoneum. RETROPERITONEUM: There is minimal postprocedural inflammatory change in the right inguinal region. Fusiform abdominal aortic aneurysm 35 x 34 mm in size. REPRODUCTIVE ORGANS: URINARY BLADDER: No mass or calculus. BONES: No destructive bone lesion. ABDOMINAL WALL: No mass or hernia. ADDITIONAL COMMENTS: N/A     Mild sigmoid colonic diverticulitis. There is no abscess or drainable fluid collection.  Minimal inflammatory stranding in the right inguinal region are likely postprocedural in nature. Additional incidental/nonemergent findings are as described above. Assessment//Plan   Active Problems:    NSTEMI (non-ST elevated myocardial infarction) ([facility] [address] (7/26/2022)    Assessment & Plan    Thrombocytopenia:Platelets improving. Monitor. Most likely due to platelet consumption from thrombosis. B12 normal.    Bilateral  pulmonary emboli :moderate to severe with right ventricular strain:pt on IV heparin. S/p mechanical thrombectomy . Hypercoagulable work up as out pt. H/o recent travel. CT abdomen,pelvis negative for malignancy. Mild sigmoid colonic diverticulitis. There is no abscess or drainable fluid  collection. Continue levoquin. GI consult pending. Pt needs life long anticogulation due to severe PE unless any bleeding. Xarelto as out pt. Anemia:Stable. Check iron studies. Monitor. Elevated troponin:Due to PE. Cardiology following. Elevated liver enzymes:Improving. Monitor.         Electronically signed by Cheyenne Waddell MD on 7/31/2022 at 5:00 PM

## 2022-07-31 NOTE — CONSULTS
Consult    Patient: Tabatha Britt MRN: [id number]  SSN: [id number]    YOB: 1950  Age: 67 y.o. Sex: female      Subjective:      Tabatha Britt is a 67 y.o. female who is being seen for abdominal pain, has diverticulitis,  Patient was admitted hospital's emergency room yesterday because shortness of breath, have dizziness, with some cough, ER test showed patient had elevated troponin with mild anemia, patient has been hospital for the treatment of MI patient had severe thrombocytopenia anemia, GI consultation placed for possible sigmoid colon diverticulitis. She denies any severe abdominal pain, no constipation no diarrhea, no blood in stool, remember had a colonoscopy sometime ago,  Past Medical History:   Diagnosis Date    Hypertension      Past Surgical History:   Procedure Laterality Date    [facility] ARTERIAL PRIM NON PAULINE OR INTRACRANIAL ADDL  7/28/2022      History reviewed. No pertinent family history.   Social History     Tobacco Use    Smoking status: Never    Smokeless tobacco: Never   Substance Use Topics    Alcohol use: Never      Current Facility-Administered Medications   Medication Dose Route Frequency Provider Last Rate Last Admin    [START ON 8/1/2022] metoprolol succinate (TOPROL-XL) XL tablet 50 mg  50 mg Oral DAILY Yana Melo MD        rivaroxaban (XARELTO) tablet 15 mg  15 mg Oral BID WITH MEALS Yana Melo MD        levoFLOXacin (LEVAQUIN) tablet 500 mg  500 mg Oral Q24H Stacie Diane MD   500 mg at 07/31/22 1548    pitavastatin calcium (LIVALO) tablet 4 mg (Patient Supplied)  4 mg Oral DAILY WITH DINNER Vanesa Koenig MD   4 mg at 07/30/22 1734    fentaNYL citrate (PF) injection 12.5-50 mcg  12.5-50 mcg IntraVENous Multiple Los Chavez MD        heparin (porcine) 1,000 unit/mL injection 4,000 Units  4,000 Units IntraVENous PRN Kathia Carpenter MD        Or    heparin (porcine) 1,000 unit/mL injection 2,000 Units  2,000 Units IntraVENous PRN Alesha Heck MD   2,000 Units at 07/31/22 1423    sodium chloride (NS) flush 5-40 mL  5-40 mL IntraVENous Q8H Jyoti Jones MD   10 mL at 07/31/22 1428    sodium chloride (NS) flush 5-40 mL  5-40 mL IntraVENous PRN Jyoti Jones MD        acetaminophen (TYLENOL) tablet 650 mg  650 mg Oral Q6H PRN Alesha Heck MD   650 mg at 07/30/22 1733    Or    acetaminophen (TYLENOL) suppository 650 mg  650 mg Rectal Q6H PRN Jyoti Jones MD        polyethylene glycol (MIRALAX) packet 17 g  17 g Oral DAILY PRN Jyoti Jones MD        ondansetron (ZOFRAN ODT) tablet 4 mg  4 mg Oral Q8H PRN Jyoti Jones MD        Or    ondansetron (ZOFRAN) injection 4 mg  4 mg IntraVENous Q6H PRN Jyoti Jones MD        nitroglycerin (NITROSTAT) tablet 0.4 mg  0.4 mg SubLINGual PRN Alesha Heck MD        losartan (COZAAR) tablet 25 mg  25 mg Oral DAILY Jyoti Jones MD   25 mg at 07/31/22 0940        Allergies   Allergen Reactions    Crestor [Rosuvastatin] Other (comments)     Cramping of muscles    Lipitor [Atorvastatin] Other (comments)     Cramping of muscles    Codeine Other (comments)     Gets real dizzy and nauseated. Nsaids (Non-Steroidal Anti-Inflammatory Drug) Other (comments)     Instant h/a and stomach burning. Sulfa (Sulfonamide Antibiotics) Hives     Rash that looks like measles. Review of Systems:  Review of Systems   Constitutional:  Positive for malaise/fatigue and weight loss. HENT: Negative. Eyes: Negative. Respiratory: Negative. Cardiovascular:  Negative for chest pain. Gastrointestinal:  Negative for heartburn. Genitourinary:  Negative for dysuria. Musculoskeletal:  Negative for back pain. Skin: Negative. Psychiatric/Behavioral: Negative.         Objective:     Vitals:    07/31/22 1300 07/31/22 1400 07/31/22 1500 07/31/22 1549   BP: (!) 142/99 (!) 181/107 (!) 180/79 137/73   Pulse: 89 91 89 84   Resp: 30 15 23    Temp:   98.3 °F (36.8 °C)    SpO2: 97% 98% 98%    Weight: Height:            Physical Exam:  Physical Exam  Constitutional:       Appearance: She is ill-appearing. HENT:      Head: Atraumatic. Eyes:      General: No scleral icterus. Extraocular Movements: Extraocular movements intact. Cardiovascular:      Rate and Rhythm: Regular rhythm. Pulses: Normal pulses. Pulmonary:      Breath sounds: No stridor. Abdominal:      Palpations: Abdomen is soft. Tenderness: There is abdominal tenderness. There is no rebound. Musculoskeletal:         General: Normal range of motion. Cervical back: Neck supple. No tenderness. Neurological:      Mental Status: Mental status is at baseline. Motor: Weakness present.    Psychiatric:         Mood and Affect: Mood normal.        Recent Results (from the past 24 hour(s))   PTT    Collection Time: 07/30/22  9:27 PM   Result Value Ref Range    aPTT >153.0 (HH) 21.2 - 34.1 sec    aPTT, therapeutic range   82 - 109 sec   PTT    Collection Time: 07/30/22 10:58 PM   Result Value Ref Range    aPTT 116.9 (HH) 21.2 - 34.1 sec    aPTT, therapeutic range   82 - 109 sec   CBC W/O DIFF    Collection Time: 07/31/22  5:47 AM   Result Value Ref Range    WBC 6.9 3.6 - 11.0 K/uL    RBC 3.53 (L) 3.80 - 5.20 M/uL    HGB 10.3 (L) 11.5 - 16.0 g/dL    HCT 31.4 (L) 35.0 - 47.0 %    MCV 89.0 80.0 - 99.0 FL    MCH 29.2 26.0 - 34.0 PG    MCHC 32.8 30.0 - 36.5 g/dL    RDW 14.6 (H) 11.5 - 14.5 %    PLATELET 397 (L) 348 - 400 K/uL    MPV 12.6 8.9 - 12.9 FL    NRBC 0.0 0.0  WBC    ABSOLUTE NRBC 0.00 0.00 - 9.53 K/uL   METABOLIC PANEL, BASIC    Collection Time: 07/31/22  5:47 AM   Result Value Ref Range    Sodium 140 136 - 145 mmol/L    Potassium Hemolyzed, recollect requested 3.5 - 5.1 mmol/L    Chloride 111 (H) 97 - 108 mmol/L    CO2 23 21 - 32 mmol/L    Anion gap 6 5 - 15 mmol/L    Glucose 117 (H) 65 - 100 mg/dL    BUN 11 6 - 20 mg/dL    Creatinine 0.52 (L) 0.55 - 1.02 mg/dL    BUN/Creatinine ratio 21 (H) 12 - 20      GFR est AA >60 >60 ml/min/1.73m2    GFR est non-AA >60 >60 ml/min/1.73m2    Calcium 8.2 (L) 8.5 - 10.1 mg/dL   PTT    Collection Time: 07/31/22  5:47 AM   Result Value Ref Range    aPTT 96.2 (H) 21.2 - 34.1 sec    aPTT, therapeutic range   82 - 109 sec   POTASSIUM    Collection Time: 07/31/22  8:00 AM   Result Value Ref Range    Potassium 3.6 3.5 - 5.1 mmol/L   PTT    Collection Time: 07/31/22  1:20 PM   Result Value Ref Range    aPTT 75.8 (H) 21.2 - 34.1 sec    aPTT, therapeutic range   82 - 109 sec        CT ABD PELV W CONT   Final Result   Mild sigmoid colonic diverticulitis. There is no abscess or drainable fluid   collection. Minimal inflammatory stranding in the right inguinal region are likely   postprocedural in nature. Additional incidental/nonemergent findings are as described above. IR THROMB MECH ARTERIAL PRIM NON PAULINE OR INTRACRANIAL ADDL   Final Result      Successful aspiration thrombectomy of acute to subacute thrombus in the right   lower lobe, right main pulmonary artery, left lower lobe, and left main   pulmonary artery, with marked improvement of bilateral lower lobe perfusion post   thrombectomy. DUPLEX LOWER EXT VENOUS BILAT   Final Result      CTA CHEST W OR W WO CONT   Final Result   1. Bilateral central, segmental, and subsegmental pulmonary emboli with   moderate-severe clot burden. 2.  Elevated RV/LV ratio consistent with significant right heart strain. 3.  No acute airspace disease. CT HEAD WO CONT   Final Result   No acute intracranial process. XR CHEST PORT   Final Result   No acute process.          IR ANGIO PULMONARY BI    (Results Pending)   IR INTRO CATH PUL ART SEG / SUBSEGMENT    (Results Pending)   IR INTRO CATH PUL ART SEG / SUBSEGMENT    (Results Pending)      Assessment:     Hospital Problems  Never Reviewed            Codes Class Noted POA    NSTEMI (non-ST elevated myocardial infarction) ([facility] [address] ICD-10-CM: I21.4  ICD-9-CM: 410.70  7/26/2022 Unknown       Currently being treated for PE, troponin elevation,  Mild abdominal discomfort, sigmoid diverticulosis, diverticulitis,  Plan:   Continue current Xarelto treatment  Hematology to follow,  Antibiotic treatment  Stool softener    We will follow the patient periodically,  Likely patient need an outpatient colonoscopy after PE being treated after couple weeks    Signed By: Taye Covarrubias MD     July 31, 2022         Thank you for allowing me to participate in this patients care  Cc Referring Physician   Ricardo Goyal DO

## 2022-07-31 NOTE — Clinical Note
Received patient from ICU in stable condition. Patient oriented to room, bed in lowest position, call light within reach and bed alarm on.

## 2022-07-31 NOTE — PROGRESS NOTES
[facility] Hospitalist Progress Note      Date:7/31/2022       Room:[facility]  Patient Name:Leann Hayden     YOB: 1950     Age:69 y.o.    7/26/22 admission course  Manus Bridegshraddha is a 67 y.o. female with PMH of hypertension, HLP presented to the ED with a chief complaint of shortness of breath started yesterday. Worsening with mild exertion, associated with dizziness and left-sided chest pressure. Chest pressure is rated 5/10. Also has LE swelling, but attributes to previous knee surgery. Otherwise denies productive cough, wheezing, fever or chills. In ED, placed on 2 L nasal cannula. Troponin elevated at 300s. EKG showed no ST changes. Hemoglobin 8.9, plt 97, no baseline to compare. 7/27/22 no new complaints, tolerating medications well  Tolerating heparin drip well  Case discussed at bedside with her cardiologist Dr Aleksander Sharp by telephone    7/27/22 echocardiogram  Result status: Final result     Left Ventricle: Normal left ventricular systolic function with a visually estimated EF of 65 - 70%. Left ventricle size is normal. Increased wall thickness. Normal wall motion. Abnormal diastolic function. Right Ventricle: Right ventricle is moderately dilated. Normal wall thickness. Mildly hypokinetic. Pulmonary pressure is 47 mmHg. Mitral Valve: Mild regurgitation. Tricuspid Valve: Moderate to severe regurgitation. 7/27/22 CTA CHEST  IMPRESSION  1. Bilateral central, segmental, and subsegmental pulmonary emboli with  moderate-severe clot burden. 2.  Elevated RV/LV ratio consistent with significant right heart strain. 3.  No acute airspace disease. 7/28/22 INTERVENTIONAL RADIOLOGY  This is a 67 y.o. female with submassive bilateral PE with mild right heart strain who remains hemodynamically stable however has had minimal improvement in her symptoms (chest pressure, shortness of breath) since initiating Heparin gtt. BLE venous duplex is pending.   Procedure to be performed:  PE mechanical thrombectomy  Patient to remain NPO  Plan for sedation:  moderate  Post procedure plan:  observation per protocol  Informed consent:  risks, benefits, and alternatives reviewed with the patient / family who agree to proceed  Code status:  Full Code    22 Venous duplex lower extremities  Thrombus present in the right popliteal vein. Thrombus present in the right posterior tibial vein. No evidence of deep vein thrombosis in the left lower extremity. 22 she is to undergo interventional radiology procedure today      Subjective      Feels well. Got up to the bathroom without CP, SOB. Right groin \"knot\"; no overt bleeding. No RLE pain, discoloration. Objective         Vitals Last 24 Hours:  TEMPERATURE:  Temp  Av.3 °F (36.8 °C)  Min: 98.2 °F (36.8 °C)  Max: 98.5 °F (36.9 °C)  RESPIRATIONS RANGE: Resp  Av.6  Min: 15  Max: 38  PULSE OXIMETRY RANGE: SpO2  Av %  Min: 94 %  Max: 98 %  PULSE RANGE: Pulse  Av.8  Min: 78  Max: 91  BLOOD PRESSURE RANGE: Systolic (57KUP), CAROL:846 , Min:126 , JSZ:030   ; Diastolic (62UGF), SELAM:81, Min:73, Max:107    I/O (24Hr):     Intake/Output Summary (Last 24 hours) at 2022 1834  Last data filed at 2022 1545  Gross per 24 hour   Intake --   Output 1300 ml   Net -1300 ml       NAD, AxOx3  Neck supple  Lungs good bilat breath sounds; O2 Sat 99% RA  Heart S1S2, sinus  Abd soft; right groin minimal/small knot  Vascular no edema; Right Dorsalis palpable  Neuro no focal motor deficits  Psych good and appropriate affect    Labs/Imaging/Diagnostics    Labs:  CBC:  Recent Labs     22  0547 22  0230 22  0218   WBC 6.9 7.2 7.7   RBC 3.53* 3.56* 3.51*   HGB 10.3* 10.4* 10.4*   HCT 31.4* 31.7* 31.5*   MCV 89.0 89.0 89.7   RDW 14.6* 14.3 14.5   * 115* 115*       CHEMISTRIES:  Recent Labs     22  0800 22  0547 22  0230 22  0218   NA  --  140 141 143   K 3.6 Hemolyzed, recollect requested 3.4* 3.3*   CL --  111* 110* 110*   CO2  --  23 22 26   BUN  --  11 13 19   CA  --  8.2* 8.4* 8.2*       APTT:  Recent Labs     07/31/22  1320 07/31/22  0547 07/30/22  2258   APTT 75.8* 96.2* 116.9*       LIVER PROFILE:  Recent Labs     07/30/22  0230 07/29/22  0218   AST 24 36   ALT 66 87*       Lab Results   Component Value Date/Time    ALT (SGPT) 66 07/30/2022 02:30 AM    AST (SGOT) 24 07/30/2022 02:30 AM    Alk. phosphatase 83 07/30/2022 02:30 AM    Bilirubin, direct 0.2 07/30/2022 02:30 AM    Bilirubin, total 1.0 07/30/2022 02:30 AM         Assessment & Plan  7/26/22 admission course  Shea Araujo is a 67 y.o. female with PMH of hypertension, HLP presented to the ED with a chief complaint of shortness of breath started yesterday. Worsening with mild exertion, associated with dizziness and left-sided chest pressure. Chest pressure is rated 5/10. Also has LE swelling, but attributes to previous knee surgery. Otherwise denies productive cough, wheezing, fever or chills. In ED, placed on 2 L nasal cannula. Troponin elevated at 300s. EKG showed no ST changes. Hemoglobin 8.9, plt 97, no baseline to compare. 7/27/22 echocardiogram  Result status: Final result     Left Ventricle: Normal left ventricular systolic function with a visually estimated EF of 65 - 70%. Left ventricle size is normal. Increased wall thickness. Normal wall motion. Abnormal diastolic function. Right Ventricle: Right ventricle is moderately dilated. Normal wall thickness. Mildly hypokinetic. Pulmonary pressure is 47 mmHg. Mitral Valve: Mild regurgitation. Tricuspid Valve: Moderate to severe regurgitation. 7/27/22 CTA CHEST  IMPRESSION  1. Bilateral central, segmental, and subsegmental pulmonary emboli with  moderate-severe clot burden. 2.  Elevated RV/LV ratio consistent with significant right heart strain. 3.  No acute airspace disease.     7/28/22 Venous duplex lower extremities  Thrombus present in the right popliteal vein.  Thrombus present in the right posterior tibial vein. No evidence of deep vein thrombosis in the left lower extremity. ASSESSMENT AND PLAN    1) RLE Venous thromboembolism with submassive pulmonary embolism.      Heparin drip --> Xarelto transition ordered 7/31/22   S/p IR thrombectomy 7/28/22    2) Troponin Leak most likely due to PE    3) Questionable Mild Sigmoid Diverticulitis on CT 7/30/22   Levaquin    4) AAA 3x3 on CT 7/30/22    5) HTN   - Increase BB    Consultants: Pulm/Sophy, Heme/Yellpolyi    ==> Transfer to Tele/Floor  ==> Preferred Xarelto     # Full code  Electronically signed by Sowmya Mensah MD on 7/31/2022 at 7:51 AM

## 2022-07-31 NOTE — PROGRESS NOTES
Problem: Falls - Risk of  Goal: *Absence of Falls  Description: Document Leafy Vargas Fall Risk and appropriate interventions in the flowsheet.   Outcome: Progressing Towards Goal  Note: Fall Risk Interventions:  Mobility Interventions: Assess mobility with egress test         Medication Interventions: Patient to call before getting OOB    Elimination Interventions: Call light in reach    History of Falls Interventions: Room close to nurse's station         Problem: Patient Education: Go to Patient Education Activity  Goal: Patient/Family Education  Outcome: Progressing Towards Goal

## 2022-08-01 LAB
ALBUMIN SERPL ELPH-MCNC: 3.4 G/DL (ref 2.9–4.4)
ALBUMIN/GLOB SERPL: 1.2 {RATIO} (ref 0.7–1.7)
ALPHA1 GLOB SERPL ELPH-MCNC: 0.2 G/DL (ref 0–0.4)
ALPHA2 GLOB SERPL ELPH-MCNC: 0.8 G/DL (ref 0.4–1)
ANION GAP SERPL CALC-SCNC: 7 MMOL/L (ref 5–15)
B-GLOBULIN SERPL ELPH-MCNC: 0.9 G/DL (ref 0.7–1.3)
BUN SERPL-MCNC: 10 MG/DL (ref 6–20)
BUN/CREAT SERPL: 16 (ref 12–20)
CA-I BLD-MCNC: 9 MG/DL (ref 8.5–10.1)
CHLORIDE SERPL-SCNC: 109 MMOL/L (ref 97–108)
CO2 SERPL-SCNC: 26 MMOL/L (ref 21–32)
CREAT SERPL-MCNC: 0.63 MG/DL (ref 0.55–1.02)
ERYTHROCYTE [DISTWIDTH] IN BLOOD BY AUTOMATED COUNT: 14.4 % (ref 11.5–14.5)
FERRITIN SERPL-MCNC: 181 NG/ML (ref 8–252)
GAMMA GLOB SERPL ELPH-MCNC: 0.9 G/DL (ref 0.4–1.8)
GLOBULIN SER CALC-MCNC: 2.8 G/DL (ref 2.2–3.9)
GLUCOSE SERPL-MCNC: 101 MG/DL (ref 65–100)
HCT VFR BLD AUTO: 33.4 % (ref 35–47)
HGB BLD-MCNC: 10.9 G/DL (ref 11.5–16)
IRON SATN MFR SERPL: 19 % (ref 20–50)
IRON SERPL-MCNC: 52 UG/DL (ref 35–150)
M PROTEIN SERPL ELPH-MCNC: NORMAL G/DL
MCH RBC QN AUTO: 29.2 PG (ref 26–34)
MCHC RBC AUTO-ENTMCNC: 32.6 G/DL (ref 30–36.5)
MCV RBC AUTO: 89.5 FL (ref 80–99)
NRBC # BLD: 0 K/UL (ref 0–0.01)
NRBC BLD-RTO: 0 PER 100 WBC
PLATELET # BLD AUTO: 133 K/UL (ref 150–400)
PMV BLD AUTO: 12.4 FL (ref 8.9–12.9)
POTASSIUM SERPL-SCNC: 4 MMOL/L (ref 3.5–5.1)
PROT SERPL-MCNC: 6.2 G/DL (ref 6–8.5)
RBC # BLD AUTO: 3.73 M/UL (ref 3.8–5.2)
SODIUM SERPL-SCNC: 142 MMOL/L (ref 136–145)
TIBC SERPL-MCNC: 275 UG/DL (ref 250–450)
WBC # BLD AUTO: 5.9 K/UL (ref 3.6–11)

## 2022-08-01 PROCEDURE — 94761 N-INVAS EAR/PLS OXIMETRY MLT: CPT

## 2022-08-01 PROCEDURE — 80048 BASIC METABOLIC PNL TOTAL CA: CPT

## 2022-08-01 PROCEDURE — 82728 ASSAY OF FERRITIN: CPT

## 2022-08-01 PROCEDURE — 74011250637 HC RX REV CODE- 250/637: Performed by: INTERNAL MEDICINE

## 2022-08-01 PROCEDURE — 85027 COMPLETE CBC AUTOMATED: CPT

## 2022-08-01 PROCEDURE — 74011000250 HC RX REV CODE- 250: Performed by: INTERNAL MEDICINE

## 2022-08-01 PROCEDURE — 83090 ASSAY OF HOMOCYSTEINE: CPT

## 2022-08-01 PROCEDURE — 36415 COLL VENOUS BLD VENIPUNCTURE: CPT

## 2022-08-01 PROCEDURE — 65270000032 HC RM SEMIPRIVATE

## 2022-08-01 PROCEDURE — 83540 ASSAY OF IRON: CPT

## 2022-08-01 RX ADMIN — ACETAMINOPHEN 650 MG: 325 TABLET, FILM COATED ORAL at 22:55

## 2022-08-01 RX ADMIN — SODIUM CHLORIDE, PRESERVATIVE FREE 10 ML: 5 INJECTION INTRAVENOUS at 06:37

## 2022-08-01 RX ADMIN — RIVAROXABAN 15 MG: 15 TABLET, FILM COATED ORAL at 08:57

## 2022-08-01 RX ADMIN — METOPROLOL SUCCINATE 50 MG: 25 TABLET, EXTENDED RELEASE ORAL at 08:57

## 2022-08-01 RX ADMIN — LEVOFLOXACIN 500 MG: 500 TABLET, FILM COATED ORAL at 16:13

## 2022-08-01 RX ADMIN — LOSARTAN POTASSIUM 25 MG: 50 TABLET, FILM COATED ORAL at 08:57

## 2022-08-01 RX ADMIN — SODIUM CHLORIDE, PRESERVATIVE FREE 10 ML: 5 INJECTION INTRAVENOUS at 14:34

## 2022-08-01 RX ADMIN — RIVAROXABAN 15 MG: 15 TABLET, FILM COATED ORAL at 16:13

## 2022-08-01 RX ADMIN — SODIUM CHLORIDE, PRESERVATIVE FREE 10 ML: 5 INJECTION INTRAVENOUS at 21:00

## 2022-08-01 NOTE — PROGRESS NOTES
CM reviewed chart and met with patient to discuss discharge disposition. Patient will be returning home with her  and son. Her son will be transporting her home. CM will continue to follow.

## 2022-08-01 NOTE — PROGRESS NOTES
Hematology and Oncology Progress Note    Patient: Thurston Ahumada MRN: [id number]  SSN: [id number]    YOB: 1950  Age: 67 y.o. Sex: female      Admit Date: 7/26/2022    LOS: 6 days     Chief Complaint: Patient has fatigue    Subjective:     Patient says her breathing is much better. She has no mucosal bleeding. No palpitation or chest pain. Objective:     Vitals:    07/31/22 2015 08/01/22 0010 08/01/22 0827 08/01/22 0854   BP: (!) 158/63 (!) 150/85  135/85   Pulse: 85 76  82   Resp: 20 20  18   Temp: 98.5 °F (36.9 °C) 97.8 °F (36.6 °C)  98 °F (36.7 °C)   SpO2: 98% 97% 95% 97%   Weight:       Height:              Physical Exam:   Constitutional: Elderly female not in any acute distress or pain. Eyes: Sclerae anicteric. Conjunctivae shows pallor. ENMT: Oral mucosa is moist, no thrush, mucositis, or petechiae. Neck: No adenopathy. Respiratory: Lungs are clear bilaterally. Cardiovascular: Normal sinus rhythm. Abdomen: Soft. Nontender. No hepatosplenomegaly. No guarding or rigidity. Bowel sounds present. Extremities: No edema. Skin: No petechiae; no skin rash. Neurologic: Alert/oriented x 3. Lab/Data Review:    Recent Results (from the past 24 hour(s))   PTT    Collection Time: 07/31/22  1:20 PM   Result Value Ref Range    aPTT 75.8 (H) 21.2 - 34.1 sec    aPTT, therapeutic range   82 - 109 sec   CBC W/O DIFF    Collection Time: 08/01/22  7:48 AM   Result Value Ref Range    WBC 5.9 3.6 - 11.0 K/uL    RBC 3.73 (L) 3.80 - 5.20 M/uL    HGB 10.9 (L) 11.5 - 16.0 g/dL    HCT 33.4 (L) 35.0 - 47.0 %    MCV 89.5 80.0 - 99.0 FL    MCH 29.2 26.0 - 34.0 PG    MCHC 32.6 30.0 - 36.5 g/dL    RDW 14.4 11.5 - 14.5 %    PLATELET 230 (L) 833 - 400 K/uL    MPV 12.4 8.9 - 12.9 FL    NRBC 0.0 0.0  WBC    ABSOLUTE NRBC 0.00 0.00 - 0.01 K/uL           Assessment and plan:     1) Thrombocytopenia:Platelets improving. Monitor. Most likely due to platelet consumption from thrombosis. B12 normal.  -Platelet counts are slightly better at 133,000 today. Last 2 days it is slowly improving. No evidence of any petechiae or bleeding. 2) Bilateral  pulmonary emboli :moderate to severe with right ventricular strain:pt on IV heparin. S/p mechanical thrombectomy . Hypercoagulable work up as out pt. H/o recent travel. CT abdomen,pelvis negative for malignancy. Pt needs life long anticogulation due to severe PE unless any bleeding. Xarelto as out pt. 3) Anemia: Hemoglobin is 10.9 which is slightly better. -Iron studies shows vitamin B12 of 337, folate 17, iron 76, TIBC 284, iron saturation of 27% and ferritin of 280. Patient's haptoglobin was within normal limits.    -I have discussed with patient about pulmonary embolism and its natural history and what she needs to watch for. This dictation was done by dragon, computer voice recognition software. Often unanticipated grammatical, syntax, phones and other interpretive errors are inadvertently transcribed. Please excuse errors that have escaped final proofreading.        Signed By: Ross Archer MD     August 1, 2022

## 2022-08-01 NOTE — PROGRESS NOTES
Pulmonology and Critical Care Progress Note    Subjective:     Patient seen and examined  Overnight events noted    Lying in bed comfortably  Awake and alert  On room air  No acute distress  Stable respiratory status          Current Facility-Administered Medications   Medication Dose Route Frequency Provider Last Rate Last Admin    metoprolol succinate (TOPROL-XL) XL tablet 50 mg  50 mg Oral DAILY Cindy Pierce MD   50 mg at 08/01/22 0857    rivaroxaban (XARELTO) tablet 15 mg  15 mg Oral BID WITH MEALS Cindy Pierce MD   15 mg at 08/01/22 0857    levoFLOXacin (LEVAQUIN) tablet 500 mg  500 mg Oral Q24H Khai Frederick MD   500 mg at 07/31/22 1548    pitavastatin calcium (LIVALO) tablet 4 mg (Patient Supplied)  4 mg Oral DAILY WITH Dorita Park MD   4 mg at 07/31/22 1900    fentaNYL citrate (PF) injection 12.5-50 mcg  12.5-50 mcg IntraVENous Multiple Beatrice Sidhu MD        sodium chloride (NS) flush 5-40 mL  5-40 mL IntraVENous Q8H Rafael Jones MD   10 mL at 08/01/22 3775    sodium chloride (NS) flush 5-40 mL  5-40 mL IntraVENous PRN Sosa Wyatt MD        acetaminophen (TYLENOL) tablet 650 mg  650 mg Oral Q6H PRN Sosa Wyatt MD   650 mg at 07/30/22 1733    Or    acetaminophen (TYLENOL) suppository 650 mg  650 mg Rectal Q6H PRN Sosa Wyatt MD        polyethylene glycol (MIRALAX) packet 17 g  17 g Oral DAILY PRN Sosa Waytt MD        ondansetron (ZOFRAN ODT) tablet 4 mg  4 mg Oral Q8H PRN Sosa Wyatt MD        Or    ondansetron (ZOFRAN) injection 4 mg  4 mg IntraVENous Q6H PRN Rafael Jones MD        nitroglycerin (NITROSTAT) tablet 0.4 mg  0.4 mg SubLINGual PRN Sosa Wyatt MD        losartan (COZAAR) tablet 25 mg  25 mg Oral DAILY Sosa Wyatt MD   25 mg at 08/01/22 8937          Allergies   Allergen Reactions    Latex, Natural Rubber Hives    Crestor [Rosuvastatin] Other (comments)     Cramping of muscles    Lipitor [Atorvastatin] Other (comments)     Cramping of muscles Codeine Other (comments)     Gets real dizzy and nauseated. Nsaids (Non-Steroidal Anti-Inflammatory Drug) Other (comments)     Instant h/a and stomach burning. Sulfa (Sulfonamide Antibiotics) Hives     Rash that looks like measles. Review of Systems:  A comprehensive review of systems was negative except for that written in the HPI. Objective:     Blood pressure 135/85, pulse 82, temperature 98 °F (36.7 °C), resp. rate 18, height 5' 6.14\" (1.68 m), weight 90.7 kg (199 lb 15.3 oz), SpO2 97 %. Temp (24hrs), Av.2 °F (36.8 °C), Min:97.8 °F (36.6 °C), Max:98.5 °F (36.9 °C)      Intake and Output:  Current Shift: No intake/output data recorded. Last 3 Shifts:  1901 -  0700  In: -   Out: 1700 [Urine:1700]    Physical Exam:   General appearance: alert, cooperative, no distress, appears stated age  Head: Normocephalic, without obvious abnormality, atraumatic  Eyes: negative  Neck: supple, symmetrical, trachea midline, no adenopathy, and no JVD  Lungs: clear to auscultation bilaterally, currently on room air  Heart: regular rate and rhythm, S1, S2 normal, no murmur, click, rub or gallop  Abdomen: soft, non-tender. Bowel sounds normal. No masses,  no organomegaly  Extremities: extremities normal, atraumatic, no cyanosis or edema  Pulses: 2+ and symmetric  Skin: Skin color, texture, turgor normal. No rashes or lesions  Lymph nodes: Cervical, supraclavicular, and axillary nodes normal.  Neurologic: Grossly normal, alert and oriented x3    Additional comments:None    Lab/Data Review: All lab results for the last 24 hours reviewed.   Recent Results (from the past 24 hour(s))   PTT    Collection Time: 22  1:20 PM   Result Value Ref Range    aPTT 75.8 (H) 21.2 - 34.1 sec    aPTT, therapeutic range   82 - 109 sec   CBC W/O DIFF    Collection Time: 22  7:48 AM   Result Value Ref Range    WBC 5.9 3.6 - 11.0 K/uL    RBC 3.73 (L) 3.80 - 5.20 M/uL    HGB 10.9 (L) 11.5 - 16.0 g/dL    HCT 33.4 (L) 35.0 - 47.0 %    MCV 89.5 80.0 - 99.0 FL    MCH 29.2 26.0 - 34.0 PG    MCHC 32.6 30.0 - 36.5 g/dL    RDW 14.4 11.5 - 14.5 %    PLATELET 135 (L) 101 - 400 K/uL    MPV 12.4 8.9 - 12.9 FL    NRBC 0.0 0.0  WBC    ABSOLUTE NRBC 0.00 0.00 - 5.85 K/uL   METABOLIC PANEL, BASIC    Collection Time: 08/01/22  7:48 AM   Result Value Ref Range    Sodium 142 136 - 145 mmol/L    Potassium 4.0 3.5 - 5.1 mmol/L    Chloride 109 (H) 97 - 108 mmol/L    CO2 26 21 - 32 mmol/L    Anion gap 7 5 - 15 mmol/L    Glucose 101 (H) 65 - 100 mg/dL    BUN 10 6 - 20 mg/dL    Creatinine 0.63 0.55 - 1.02 mg/dL    BUN/Creatinine ratio 16 12 - 20      GFR est AA >60 >60 ml/min/1.73m2    GFR est non-AA >60 >60 ml/min/1.73m2    Calcium 9.0 8.5 - 10.1 mg/dL         Chest X-Ray:   CT ABD PELV W CONT   Final Result   Mild sigmoid colonic diverticulitis. There is no abscess or drainable fluid   collection. Minimal inflammatory stranding in the right inguinal region are likely   postprocedural in nature. Additional incidental/nonemergent findings are as described above. IR THROMB MECH ARTERIAL PRIM NON PAULINE OR INTRACRANIAL ADDL   Final Result      Successful aspiration thrombectomy of acute to subacute thrombus in the right   lower lobe, right main pulmonary artery, left lower lobe, and left main   pulmonary artery, with marked improvement of bilateral lower lobe perfusion post   thrombectomy. DUPLEX LOWER EXT VENOUS BILAT   Final Result      CTA CHEST W OR W WO CONT   Final Result   1. Bilateral central, segmental, and subsegmental pulmonary emboli with   moderate-severe clot burden. 2.  Elevated RV/LV ratio consistent with significant right heart strain. 3.  No acute airspace disease. CT HEAD WO CONT   Final Result   No acute intracranial process. XR CHEST PORT   Final Result   No acute process.          IR ANGIO PULMONARY BI    (Results Pending)   IR INTRO CATH PUL ART SEG / SUBSEGMENT    (Results Pending)   IR INTRO CATH PUL ART SEG / SUBSEGMENT    (Results Pending)       CT imaging:  CT Results  (Last 48 hours)                 07/30/22 1352  CT ABD PELV W CONT Final result    Impression:  Mild sigmoid colonic diverticulitis. There is no abscess or drainable fluid   collection. Minimal inflammatory stranding in the right inguinal region are likely   postprocedural in nature. Additional incidental/nonemergent findings are as described above. Narrative:  EXAM: CT ABD PELV W CONT   Clinical history: PE, anemia, elevated LFTs, status post thrombolysis. INDICATION: PE,anemia. Elevated lfts       COMPARISON: 2007        CONTRAST: 100 mL of Isovue-370. ORAL CONTRAST: Was administered       TECHNIQUE:    Following the uneventful intravenous administration of contrast, thin axial   images were obtained through the abdomen and pelvis. Coronal and sagittal   reconstructions were generated. CT dose reduction was achieved through use of a   standardized protocol tailored for this examination and automatic exposure   control for dose modulation. FINDINGS:    LOWER THORAX: Small left effusion. LIVER: Hepatic steatosis. BILIARY TREE: Cholecystectomy. CBD is not dilated. SPLEEN: within normal limits. PANCREAS: No mass or ductal dilatation. ADRENALS: Unremarkable. KIDNEYS: Nonobstructive calculus on the left. Nonobstructive calculus on the   right. STOMACH: Small hiatal hernia. SMALL BOWEL: No dilatation or wall thickening. COLON: Minimal sigmoid colonic wall thickening with pericolonic inflammatory   stranding and diverticula. APPENDIX: Normal   PERITONEUM: No ascites or pneumoperitoneum. RETROPERITONEUM: There is minimal postprocedural inflammatory change in the   right inguinal region. Fusiform abdominal aortic aneurysm 35 x 34 mm in size. REPRODUCTIVE ORGANS:   URINARY BLADDER: No mass or calculus. BONES: No destructive bone lesion.    ABDOMINAL WALL: No mass or hernia. ADDITIONAL COMMENTS: N/A                   PFTs:   PFT Results  (Last 3 results in the past 10 years)      None              Assessment:     Patient is a 60-year-old -American female with history of obesity, hypertension, and hyperlipidemia who presented to the hospital on 7/26/2022 with worsening shortness of breath, left-sided chest pressure, and dizziness and was found to have large volume bilateral pulmonary emboli. Plan:     1.)  Acute hypoxic respiratory failure  -Secondary to submassive acute pulmonary emboli, now improved status post thrombectomy with IR on 7/28/2022    Now on room air  Tolerating well     2.)  Bilateral pulmonary emboli with right lower extremity DVT  -Moderate to severe clot burden in bilateral pulmonary arteries, now status post thrombectomy with adequate response    -Does have a brother with clots, recent trip to [place] in early July  -Lower extremity venous Doppler demonstrating thrombus in the right popliteal vein/posterior tibial vein  -Close follow-up with hematology in the outpatient setting, hypercoagulable work-up pending    Off IV heparin drip  On Xarelto    3.)  Elevated troponin  -Troponin elevated to 341 on admission, peaked at 386, now down to 152  -Secondary to acute PE, doubt ACS  -TTE revealed an EF of 65 to 70%, abnormal diastolic function, moderately dilated RV, decreased systolic function of the RV, and moderate to severe tricuspid valve regurgitation.  -Radiology following closely, continue on heparin drip. Transition to Xarelto at discharge.     4.)  Transaminitis  -,  on admission; doubt viral hepatitis  -Suspect secondary to poor perfusion from pulmonary emboli  -Repeat LFTs are normalizing    5.)  Thrombocytopenia  -Platelet count 97 on admission, suspect secondary to clot formation  -Now improving to 124 today  -Hematology following closely, appreciate their assistance    PT OT evaluation  Out of bed to chair with support as tolerated      CODE STATUS: Full Code    Total time spent with patient: 30 minutes      Guillermo Conn MD  [facility]  8/1/2022

## 2022-08-01 NOTE — PROGRESS NOTES
Hospitalist Progress Note    Subjective:   Daily Progress Note: 8/1/2022 12:51 PM    Hospital Course:  67year old Female presented to ED with c/o shortness of breath, dizziness. CTA chest showes B/L pulmonary embolism with moderate to severe clot burden. Pulmonary and IR consulted. PE mechanical thrombectomy done on 7/28/22. GI was consulted for possible diverticulitis. DVT studies showed thrombus in right popliteal and right posterior tibial vein. Subjective:  Patient states that she has not been ambulating here in the hospital. I encouraged patient to ambulate with support as I am expecting to discharge her tomorrow. Patient states that she feels winded when she tries to walk.      Current Facility-Administered Medications   Medication Dose Route Frequency    metoprolol succinate (TOPROL-XL) XL tablet 50 mg  50 mg Oral DAILY    rivaroxaban (XARELTO) tablet 15 mg  15 mg Oral BID WITH MEALS    levoFLOXacin (LEVAQUIN) tablet 500 mg  500 mg Oral Q24H    pitavastatin calcium (LIVALO) tablet 4 mg (Patient Supplied)  4 mg Oral DAILY WITH DINNER    fentaNYL citrate (PF) injection 12.5-50 mcg  12.5-50 mcg IntraVENous Multiple    sodium chloride (NS) flush 5-40 mL  5-40 mL IntraVENous Q8H    sodium chloride (NS) flush 5-40 mL  5-40 mL IntraVENous PRN    acetaminophen (TYLENOL) tablet 650 mg  650 mg Oral Q6H PRN    Or    acetaminophen (TYLENOL) suppository 650 mg  650 mg Rectal Q6H PRN    polyethylene glycol (MIRALAX) packet 17 g  17 g Oral DAILY PRN    ondansetron (ZOFRAN ODT) tablet 4 mg  4 mg Oral Q8H PRN    Or    ondansetron (ZOFRAN) injection 4 mg  4 mg IntraVENous Q6H PRN    nitroglycerin (NITROSTAT) tablet 0.4 mg  0.4 mg SubLINGual PRN    losartan (COZAAR) tablet 25 mg  25 mg Oral DAILY        Review of Systems  Constitutional: No fevers, No chills, No sweats, No fatigue, No Weakness  Eyes: No redness  Ears, nose, mouth, throat, and face: No nasal congestion, No sore throat, No voice change  Respiratory: No Shortness of Breath, No cough, No wheezing  Cardiovascular: No chest pain, No palpitations, No extremity edema  Gastrointestinal: No nausea, No vomiting, No diarrhea, No abdominal pain  Genitourinary: No frequency, No dysuria, No hematuria  Integument/breast: No skin lesion(s)   Neurological: No Confusion, No headaches, No dizziness      Objective:     Visit Vitals  /85 (BP 1 Location: Left upper arm, BP Patient Position: Semi fowlers)   Pulse 82   Temp 98 °F (36.7 °C)   Resp 18   Ht 5' 6.14\" (1.68 m)   Wt 90.7 kg (199 lb 15.3 oz)   SpO2 97%   BMI 32.14 kg/m²    O2 Flow Rate (L/min): 2 l/min O2 Device: None (Room air)    Temp (24hrs), Av.2 °F (36.8 °C), Min:97.8 °F (36.6 °C), Max:98.5 °F (36.9 °C)      No intake/output data recorded.  1901 -  0700  In: -   Out: 1700 [Urine:1700]    PHYSICAL EXAM:  Constitutional: No acute distress  Skin: Extremities and face reveal no rashes. HEENT: Sclerae anicteric. Extra-occular muscles are intact. No oral ulcers. The neck is supple and no masses. Cardiovascular: Regular rate and rhythm. Respiratory:  Clear breath sounds bilaterally with no wheezes, rales, or rhonchi. GI: Abdomen nondistended, soft, and nontender. Normal active bowel sounds. Musculoskeletal: No pitting edema of the lower legs. Able to move all ext  Neurological:  Patient is alert and oriented.  Cranial nerves II-XII grossly intact  Psychiatric: Mood appears appropriate       Data Review    Recent Results (from the past 24 hour(s))   PTT    Collection Time: 22  1:20 PM   Result Value Ref Range    aPTT 75.8 (H) 21.2 - 34.1 sec    aPTT, therapeutic range   82 - 109 sec   CBC W/O DIFF    Collection Time: 22  7:48 AM   Result Value Ref Range    WBC 5.9 3.6 - 11.0 K/uL    RBC 3.73 (L) 3.80 - 5.20 M/uL    HGB 10.9 (L) 11.5 - 16.0 g/dL    HCT 33.4 (L) 35.0 - 47.0 %    MCV 89.5 80.0 - 99.0 FL    MCH 29.2 26.0 - 34.0 PG    MCHC 32.6 30.0 - 36.5 g/dL    RDW 14.4 11.5 - 14.5 % PLATELET 456 (L) 006 - 400 K/uL    MPV 12.4 8.9 - 12.9 FL    NRBC 0.0 0.0  WBC    ABSOLUTE NRBC 0.00 0.00 - 0.67 K/uL   METABOLIC PANEL, BASIC    Collection Time: 08/01/22  7:48 AM   Result Value Ref Range    Sodium 142 136 - 145 mmol/L    Potassium 4.0 3.5 - 5.1 mmol/L    Chloride 109 (H) 97 - 108 mmol/L    CO2 26 21 - 32 mmol/L    Anion gap 7 5 - 15 mmol/L    Glucose 101 (H) 65 - 100 mg/dL    BUN 10 6 - 20 mg/dL    Creatinine 0.63 0.55 - 1.02 mg/dL    BUN/Creatinine ratio 16 12 - 20      GFR est AA >60 >60 ml/min/1.73m2    GFR est non-AA >60 >60 ml/min/1.73m2    Calcium 9.0 8.5 - 10.1 mg/dL         Assessment / Plan:  Submassive pulmonary embolism:  S/p IR thrombectomy on 7/28/22  Off heparin drip, on xarelto since 7/31/22  Ambulate, out of bed to chair. NSTEMI:  S/s PE    Concern for mild sigmoid diverticulitis on CT 7/30/22  Continue levaquin  Outpatient GI follow up. AAA 3X3 on CT     Hypertension:  Continue metoprolol      30.0 - 39.9 Obese / Body mass index is 32.14 kg/m². Code status: Full  Prophylaxis:  on xarelto  Recommended Disposition: Home w/Family     time spent 35 minutes involving direct patient care as well as reviewing patient's labs and coordination of care with nursing staff     Care Plan discussed with: Patient/Family/RN/Case Management        Total time spent with patient: 35 minutes.

## 2022-08-01 NOTE — PROGRESS NOTES
Progress Note    Patient: Thurston Ahumada MRN: [id number]  SSN: [id number]    YOB: 1950  Age: 67 y.o.   Sex: female      Admit Date: 7/26/2022    LOS: 6 days     Subjective:   Less pain  Past Medical History:   Diagnosis Date    Hypertension         Current Facility-Administered Medications:     metoprolol succinate (TOPROL-XL) XL tablet 50 mg, 50 mg, Oral, DAILY, Cory Gonzalez MD, 50 mg at 08/01/22 7158    rivaroxaban (XARELTO) tablet 15 mg, 15 mg, Oral, BID WITH MEALS, Gautam Martin MD, 15 mg at 08/01/22 0857    levoFLOXacin (LEVAQUIN) tablet 500 mg, 500 mg, Oral, Q24H, Etta Pantoja MD, 500 mg at 07/31/22 1548    pitavastatin calcium (LIVALO) tablet 4 mg (Patient Supplied), 4 mg, Oral, DAILY WITH Brien Crane, Jayesh AMES MD, 4 mg at 07/31/22 1900    fentaNYL citrate (PF) injection 12.5-50 mcg, 12.5-50 mcg, IntraVENous, Multiple, Tana Vasquez MD    sodium chloride (NS) flush 5-40 mL, 5-40 mL, IntraVENous, Q8H, Leslie Jones MD, 10 mL at 08/01/22 5643    sodium chloride (NS) flush 5-40 mL, 5-40 mL, IntraVENous, PRN, Lisset RamirezLeslie reid MD    acetaminophen (TYLENOL) tablet 650 mg, 650 mg, Oral, Q6H PRN, 650 mg at 07/30/22 1733 **OR** acetaminophen (TYLENOL) suppository 650 mg, 650 mg, Rectal, Q6H PRN, Leslie Jones MD    polyethylene glycol (MIRALAX) packet 17 g, 17 g, Oral, DAILY PRN, Leslie Jones MD    ondansetron (ZOFRAN ODT) tablet 4 mg, 4 mg, Oral, Q8H PRN **OR** ondansetron (ZOFRAN) injection 4 mg, 4 mg, IntraVENous, Q6H PRN, Leslie Jones MD    nitroglycerin (NITROSTAT) tablet 0.4 mg, 0.4 mg, SubLINGual, PRN, Leslie Torres MD    losartan (COZAAR) tablet 25 mg, 25 mg, Oral, DAILY, Leslie Jones MD, 25 mg at 08/01/22 0857    Objective:     Vitals:    07/31/22 2015 08/01/22 0010 08/01/22 0827 08/01/22 0854   BP: (!) 158/63 (!) 150/85  135/85   Pulse: 85 76  82   Resp: 20 20  18   Temp: 98.5 °F (36.9 °C) 97.8 °F (36.6 °C)  98 °F (36.7 °C)   SpO2: 98% 97% 95% 97%   Weight: Height:            Intake and Output:  Current Shift: No intake/output data recorded. Last three shifts: 07/30 1901 - 08/01 0700  In: -   Out: 1700 [Urine:1700]    Physical Exam:   Physical Exam  HENT:      Head: Atraumatic. Cardiovascular:      Rate and Rhythm: Normal rate. Heart sounds: Normal heart sounds. Pulmonary:      Breath sounds: Normal breath sounds. Abdominal:      General: Abdomen is flat. There is no distension. Tenderness: There is no abdominal tenderness. Musculoskeletal:      Cervical back: Neck supple. Neurological:      General: No focal deficit present. Mental Status: Mental status is at baseline. Psychiatric:         Mood and Affect: Mood normal.        Lab/Data Review:  Recent Results (from the past 24 hour(s))   PTT    Collection Time: 07/31/22  1:20 PM   Result Value Ref Range    aPTT 75.8 (H) 21.2 - 34.1 sec    aPTT, therapeutic range   82 - 109 sec   CBC W/O DIFF    Collection Time: 08/01/22  7:48 AM   Result Value Ref Range    WBC 5.9 3.6 - 11.0 K/uL    RBC 3.73 (L) 3.80 - 5.20 M/uL    HGB 10.9 (L) 11.5 - 16.0 g/dL    HCT 33.4 (L) 35.0 - 47.0 %    MCV 89.5 80.0 - 99.0 FL    MCH 29.2 26.0 - 34.0 PG    MCHC 32.6 30.0 - 36.5 g/dL    RDW 14.4 11.5 - 14.5 %    PLATELET 227 (L) 149 - 400 K/uL    MPV 12.4 8.9 - 12.9 FL    NRBC 0.0 0.0  WBC    ABSOLUTE NRBC 0.00 0.00 - 8.09 K/uL   METABOLIC PANEL, BASIC    Collection Time: 08/01/22  7:48 AM   Result Value Ref Range    Sodium 142 136 - 145 mmol/L    Potassium 4.0 3.5 - 5.1 mmol/L    Chloride 109 (H) 97 - 108 mmol/L    CO2 26 21 - 32 mmol/L    Anion gap 7 5 - 15 mmol/L    Glucose 101 (H) 65 - 100 mg/dL    BUN 10 6 - 20 mg/dL    Creatinine 0.63 0.55 - 1.02 mg/dL    BUN/Creatinine ratio 16 12 - 20      GFR est AA >60 >60 ml/min/1.73m2    GFR est non-AA >60 >60 ml/min/1.73m2    Calcium 9.0 8.5 - 10.1 mg/dL        CT ABD PELV W CONT   Final Result   Mild sigmoid colonic diverticulitis.  There is no abscess or drainable fluid   collection. Minimal inflammatory stranding in the right inguinal region are likely   postprocedural in nature. Additional incidental/nonemergent findings are as described above. IR THROMB MECH ARTERIAL PRIM NON PAULINE OR INTRACRANIAL ADDL   Final Result      Successful aspiration thrombectomy of acute to subacute thrombus in the right   lower lobe, right main pulmonary artery, left lower lobe, and left main   pulmonary artery, with marked improvement of bilateral lower lobe perfusion post   thrombectomy. DUPLEX LOWER EXT VENOUS BILAT   Final Result      CTA CHEST W OR W WO CONT   Final Result   1. Bilateral central, segmental, and subsegmental pulmonary emboli with   moderate-severe clot burden. 2.  Elevated RV/LV ratio consistent with significant right heart strain. 3.  No acute airspace disease. CT HEAD WO CONT   Final Result   No acute intracranial process. XR CHEST PORT   Final Result   No acute process.          IR ANGIO PULMONARY BI    (Results Pending)   IR INTRO CATH PUL ART SEG / SUBSEGMENT    (Results Pending)   IR INTRO CATH PUL ART SEG / SUBSEGMENT    (Results Pending)        Assessment:     Active Problems:    NSTEMI (non-ST elevated myocardial infarction) ([facility] [address] (7/26/2022)         Currently being treated for PE, troponin elevation,  Mild abdominal discomfort, sigmoid diverticulosis, diverticulitis,  Plan:   Continue current Xarelto treatment  Hematology to follow,  Antibiotic treatment  Stool softener     We will follow the patient periodically,  Sign off     Recommend patient need an outpatient colonoscopy           Signed By: Mini Pearl MD     August 1, 2022        Thank you for allowing me to participate in this patients care  Cc Referring Physician   Jose A Martell DO

## 2022-08-01 NOTE — PROGRESS NOTES
Progress Note      8/1/2022 12:50 PM  NAME: Marylin Roe   MRN:  [id number]   Admit Diagnosis: NSTEMI (non-ST elevated [facility] [I21.4]      Subjective:   Chart reviewed. Discussed with the patient. Patient has a shortness of breath and has some improvement. She is found to have pulmonary embolism with a moderate burden of the clot. Patient had a thrombectomy performed and feels better. Abdominal CAT scan did reveal small abdominal aortic aneurysm. Review of Systems:    Symptom Y/N Comments  Symptom Y/N Comments   Fever/Chills n   Chest Pain n    Poor Appetite    Edema     Cough    Abdominal Pain n    Sputum    Joint Pain     SOB/RAHMAN y Has significant improvement  Pruritis/Rash     Nausea/vomit    Other     Diarrhea         Constipation           Could NOT obtain due to:      Objective:          Physical Exam:    Last 24hrs VS reviewed since prior progress note. Most recent are:    Visit Vitals  /85 (BP 1 Location: Left upper arm, BP Patient Position: Semi fowlers)   Pulse 82   Temp 98 °F (36.7 °C)   Resp 18   Ht 5' 6.14\" (1.68 m)   Wt 90.7 kg (199 lb 15.3 oz)   SpO2 97%   BMI 32.14 kg/m²       Intake/Output Summary (Last 24 hours) at 8/1/2022 1004  Last data filed at 8/1/2022 0224  Gross per 24 hour   Intake --   Output 1300 ml   Net -1300 ml          General Appearance: Well developed, well nourished, alert & oriented x 3,    no acute distress. Ears/Nose/Mouth/Throat: Hearing grossly normal.  Neck: Supple. Chest: Lungs clear to auscultation bilaterally. Cardiovascular: Regular rate and rhythm, S1,S2 normal, no murmur. Abdomen: Soft, non-tender, bowel sounds are active. Extremities: No edema bilaterally. Skin: Warm and dry. []         Post-cath site without hematoma, bruit, tenderness, or thrill. Distal pulses intact.     PMH/SH reviewed - no change compared to H&P    Data Review    Telemetry: normal sinus rhythm     EKG:   []  No new EKG for review    Lab Data Personally Reviewed:    Recent Labs     08/01/22  0748 07/31/22  0547   WBC 5.9 6.9   HGB 10.9* 10.3*   HCT 33.4* 31.4*   * 124*       Recent Labs     07/31/22  1320 07/31/22  0547 07/30/22  2258   APTT 75.8* 96.2* 116.9*        Recent Labs     08/01/22  0748 07/31/22  0800 07/31/22  0547 07/30/22  0230     --  140 141   K 4.0 3.6 Hemolyzed, recollect requested 3.4*   *  --  111* 110*   CO2 26  --  23 22   BUN 10  --  11 13   CREA 0.63  --  0.52* 0.56   *  --  117* 94   CA 9.0  --  8.2* 8.4*       No results for input(s): CPK, CKNDX, TROIQ in the last 72 hours. No lab exists for component: CPKMB  No results found for: CHOL, CHOLX, CHLST, CHOLV, HDL, HDLP, LDL, LDLC, DLDLP, TGLX, TRIGL, TRIGP, CHHD, CHHDX    Recent Labs     07/30/22  0230   AP 83   TP 5.9*   ALB 2.6*   GLOB 3.3       No results for input(s): PH, PCO2, PO2 in the last 72 hours.     Medications Personally Reviewed:    Current Facility-Administered Medications   Medication Dose Route Frequency    metoprolol succinate (TOPROL-XL) XL tablet 50 mg  50 mg Oral DAILY    rivaroxaban (XARELTO) tablet 15 mg  15 mg Oral BID WITH MEALS    levoFLOXacin (LEVAQUIN) tablet 500 mg  500 mg Oral Q24H    pitavastatin calcium (LIVALO) tablet 4 mg (Patient Supplied)  4 mg Oral DAILY WITH DINNER    fentaNYL citrate (PF) injection 12.5-50 mcg  12.5-50 mcg IntraVENous Multiple    sodium chloride (NS) flush 5-40 mL  5-40 mL IntraVENous Q8H    sodium chloride (NS) flush 5-40 mL  5-40 mL IntraVENous PRN    acetaminophen (TYLENOL) tablet 650 mg  650 mg Oral Q6H PRN    Or    acetaminophen (TYLENOL) suppository 650 mg  650 mg Rectal Q6H PRN    polyethylene glycol (MIRALAX) packet 17 g  17 g Oral DAILY PRN    ondansetron (ZOFRAN ODT) tablet 4 mg  4 mg Oral Q8H PRN    Or    ondansetron (ZOFRAN) injection 4 mg  4 mg IntraVENous Q6H PRN    nitroglycerin (NITROSTAT) tablet 0.4 mg  0.4 mg SubLINGual PRN    losartan (COZAAR) tablet 25 mg  25 mg Oral DAILY Problem List:   Patient has a pulmonary embolism. Troponin I is minimally elevated and pulmonary pressure is about 47 mmHg and right ventricle is mildly dilated with mild hypokinesis. Hypertension. Patient had a thrombectomy. Patient has a small abdominal aortic aneurysm. Assessment/Plan:   Recommend anticoagulation. We will follow recommendation of work-up by the hematologist to evaluate for hypercoagulable state. Discussed extensively with the patient. Patient can be started on oral anticoagulation and can be discharged from cardiac viewpoint and I will be happy to follow as an outpatient or she can see the cardiologist she has seen in the past.  Thank you. []       High complexity decision making was performed in this patient at high risk for decompensation with multiple organ involvement.     Aleta Topete MD

## 2022-08-02 VITALS
WEIGHT: 199.96 LBS | TEMPERATURE: 98.4 F | HEIGHT: 66 IN | OXYGEN SATURATION: 98 % | BODY MASS INDEX: 32.14 KG/M2 | DIASTOLIC BLOOD PRESSURE: 65 MMHG | HEART RATE: 77 BPM | SYSTOLIC BLOOD PRESSURE: 131 MMHG | RESPIRATION RATE: 18 BRPM

## 2022-08-02 PROBLEM — I26.99 BILATERAL PULMONARY EMBOLISM (HCC): Status: ACTIVE | Noted: 2022-08-02

## 2022-08-02 PROBLEM — I82.401 ACUTE DEEP VEIN THROMBOSIS (DVT) OF RIGHT LOWER EXTREMITY (HCC): Status: ACTIVE | Noted: 2022-08-02

## 2022-08-02 PROBLEM — I21.4 NSTEMI (NON-ST ELEVATED MYOCARDIAL INFARCTION) (HCC): Status: RESOLVED | Noted: 2022-07-26 | Resolved: 2022-08-02

## 2022-08-02 LAB
ALBUMIN SERPL-MCNC: 3 G/DL (ref 3.5–5)
ALBUMIN/GLOB SERPL: 0.9 {RATIO} (ref 1.1–2.2)
ALP SERPL-CCNC: 101 U/L (ref 45–117)
ALT SERPL-CCNC: 81 U/L (ref 12–78)
ANION GAP SERPL CALC-SCNC: 6 MMOL/L (ref 5–15)
AST SERPL W P-5'-P-CCNC: 45 U/L (ref 15–37)
BASOPHILS # BLD: 0 K/UL (ref 0–0.1)
BASOPHILS NFR BLD: 1 % (ref 0–1)
BILIRUB SERPL-MCNC: 0.9 MG/DL (ref 0.2–1)
BUN SERPL-MCNC: 17 MG/DL (ref 6–20)
BUN/CREAT SERPL: 20 (ref 12–20)
CA-I BLD-MCNC: 8.6 MG/DL (ref 8.5–10.1)
CHLORIDE SERPL-SCNC: 107 MMOL/L (ref 97–108)
CO2 SERPL-SCNC: 27 MMOL/L (ref 21–32)
CREAT SERPL-MCNC: 0.84 MG/DL (ref 0.55–1.02)
DIFFERENTIAL METHOD BLD: ABNORMAL
EOSINOPHIL # BLD: 0.2 K/UL (ref 0–0.4)
EOSINOPHIL NFR BLD: 3 % (ref 0–7)
ERYTHROCYTE [DISTWIDTH] IN BLOOD BY AUTOMATED COUNT: 14.7 % (ref 11.5–14.5)
GLOBULIN SER CALC-MCNC: 3.3 G/DL (ref 2–4)
GLUCOSE SERPL-MCNC: 92 MG/DL (ref 65–100)
HCT VFR BLD AUTO: 33.7 % (ref 35–47)
HCYS SERPL-SCNC: 1.9 UMOL/L (ref 3.7–13.9)
HGB BLD-MCNC: 11 G/DL (ref 11.5–16)
IMM GRANULOCYTES # BLD AUTO: 0 K/UL (ref 0–0.04)
IMM GRANULOCYTES NFR BLD AUTO: 1 % (ref 0–0.5)
LYMPHOCYTES # BLD: 2.1 K/UL (ref 0.8–3.5)
LYMPHOCYTES NFR BLD: 31 % (ref 12–49)
MCH RBC QN AUTO: 29.1 PG (ref 26–34)
MCHC RBC AUTO-ENTMCNC: 32.6 G/DL (ref 30–36.5)
MCV RBC AUTO: 89.2 FL (ref 80–99)
MONOCYTES # BLD: 0.4 K/UL (ref 0–1)
MONOCYTES NFR BLD: 6 % (ref 5–13)
NEUTS SEG # BLD: 4 K/UL (ref 1.8–8)
NEUTS SEG NFR BLD: 58 % (ref 32–75)
NRBC # BLD: 0 K/UL (ref 0–0.01)
NRBC BLD-RTO: 0 PER 100 WBC
PLATELET # BLD AUTO: 146 K/UL (ref 150–400)
PMV BLD AUTO: 12.9 FL (ref 8.9–12.9)
POTASSIUM SERPL-SCNC: 4 MMOL/L (ref 3.5–5.1)
PROT SERPL-MCNC: 6.3 G/DL (ref 6.4–8.2)
RBC # BLD AUTO: 3.78 M/UL (ref 3.8–5.2)
SODIUM SERPL-SCNC: 140 MMOL/L (ref 136–145)
WBC # BLD AUTO: 6.7 K/UL (ref 3.6–11)

## 2022-08-02 PROCEDURE — 80053 COMPREHEN METABOLIC PANEL: CPT

## 2022-08-02 PROCEDURE — 74011250637 HC RX REV CODE- 250/637: Performed by: INTERNAL MEDICINE

## 2022-08-02 PROCEDURE — 74011000250 HC RX REV CODE- 250: Performed by: INTERNAL MEDICINE

## 2022-08-02 PROCEDURE — 36415 COLL VENOUS BLD VENIPUNCTURE: CPT

## 2022-08-02 PROCEDURE — 85025 COMPLETE CBC W/AUTO DIFF WBC: CPT

## 2022-08-02 RX ORDER — RIVAROXABAN 15 MG-20MG
KIT ORAL
Qty: 1 DOSE PACK | Refills: 0 | Status: SHIPPED | OUTPATIENT
Start: 2022-08-02

## 2022-08-02 RX ORDER — METOPROLOL SUCCINATE 50 MG/1
50 TABLET, EXTENDED RELEASE ORAL DAILY
Qty: 30 TABLET | Refills: 0 | Status: SHIPPED | OUTPATIENT
Start: 2022-08-03 | End: 2022-09-02

## 2022-08-02 RX ADMIN — METOPROLOL SUCCINATE 50 MG: 25 TABLET, EXTENDED RELEASE ORAL at 08:57

## 2022-08-02 RX ADMIN — RIVAROXABAN 15 MG: 15 TABLET, FILM COATED ORAL at 08:57

## 2022-08-02 RX ADMIN — SODIUM CHLORIDE, PRESERVATIVE FREE 10 ML: 5 INJECTION INTRAVENOUS at 05:12

## 2022-08-02 RX ADMIN — LOSARTAN POTASSIUM 25 MG: 50 TABLET, FILM COATED ORAL at 08:58

## 2022-08-02 NOTE — PROGRESS NOTES
Pulmonology and Critical Care Progress Note    Subjective:     Patient seen and examined  Overnight events noted    Lying in bed comfortably  Awake and alert  On room air  No acute distress  Stable respiratory status          Current Facility-Administered Medications   Medication Dose Route Frequency Provider Last Rate Last Admin    metoprolol succinate (TOPROL-XL) XL tablet 50 mg  50 mg Oral DAILY Jimenez Patel MD   50 mg at 08/02/22 0857    rivaroxaban (XARELTO) tablet 15 mg  15 mg Oral BID WITH MEALS Jimenez Patel MD   15 mg at 08/02/22 0857    levoFLOXacin (LEVAQUIN) tablet 500 mg  500 mg Oral Q24H Elaine Ferrara MD   500 mg at 08/01/22 1613    pitavastatin calcium (LIVALO) tablet 4 mg (Patient Supplied)  4 mg Oral DAILY WITH Jarret Damon MD   4 mg at 07/31/22 1900    fentaNYL citrate (PF) injection 12.5-50 mcg  12.5-50 mcg IntraVENous Multiple Jose Luis Pacheco MD        sodium chloride (NS) flush 5-40 mL  5-40 mL IntraVENous Q8H Triston Jones MD   10 mL at 08/02/22 2215    sodium chloride (NS) flush 5-40 mL  5-40 mL IntraVENous PRN Rosy Woodward MD        acetaminophen (TYLENOL) tablet 650 mg  650 mg Oral Q6H PRN Rosy Woodward MD   650 mg at 08/01/22 2255    Or    acetaminophen (TYLENOL) suppository 650 mg  650 mg Rectal Q6H PRN Rosy Woodward MD        polyethylene glycol (MIRALAX) packet 17 g  17 g Oral DAILY PRN Rosy Woodward MD        ondansetron (ZOFRAN ODT) tablet 4 mg  4 mg Oral Q8H PRN Rosy Woodward MD        Or    ondansetron (ZOFRAN) injection 4 mg  4 mg IntraVENous Q6H PRN Triston Jones MD        nitroglycerin (NITROSTAT) tablet 0.4 mg  0.4 mg SubLINGual PRN Rosy Woodward MD        losartan (COZAAR) tablet 25 mg  25 mg Oral DAILY Rosy Woodward MD   25 mg at 08/02/22 8200          Allergies   Allergen Reactions    Latex, Natural Rubber Hives    Crestor [Rosuvastatin] Other (comments)     Cramping of muscles    Lipitor [Atorvastatin] Other (comments)     Cramping of muscles Codeine Other (comments)     Gets real dizzy and nauseated. Nsaids (Non-Steroidal Anti-Inflammatory Drug) Other (comments)     Instant h/a and stomach burning. Sulfa (Sulfonamide Antibiotics) Hives     Rash that looks like measles. Review of Systems:  A comprehensive review of systems was negative except for that written in the HPI. Objective:     Blood pressure 125/71, pulse 72, temperature 97.7 °F (36.5 °C), resp. rate 18, height 5' 6.14\" (1.68 m), weight 90.7 kg (199 lb 15.3 oz), SpO2 97 %. Temp (24hrs), Av.3 °F (36.8 °C), Min:97.7 °F (36.5 °C), Max:98.5 °F (36.9 °C)      Intake and Output:  Current Shift: No intake/output data recorded. Last 3 Shifts:  1901 -  0700  In: -   Out: 876 [Urine:875]    Physical Exam:   General appearance: alert, cooperative, no distress, appears stated age  Head: Normocephalic, without obvious abnormality, atraumatic  Eyes: negative  Neck: supple, symmetrical, trachea midline, no adenopathy, and no JVD  Lungs: clear to auscultation bilaterally, currently on room air  Heart: regular rate and rhythm, S1, S2 normal, no murmur, click, rub or gallop  Abdomen: soft, non-tender. Bowel sounds normal. No masses,  no organomegaly  Extremities: extremities normal, atraumatic, no cyanosis or edema  Pulses: 2+ and symmetric  Skin: Skin color, texture, turgor normal. No rashes or lesions  Lymph nodes: Cervical, supraclavicular, and axillary nodes normal.  Neurologic: Grossly normal, alert and oriented x3    Additional comments:None    Lab/Data Review: All lab results for the last 24 hours reviewed.   Recent Results (from the past 24 hour(s))   CBC WITH AUTOMATED DIFF    Collection Time: 22  5:53 AM   Result Value Ref Range    WBC 6.7 3.6 - 11.0 K/uL    RBC 3.78 (L) 3.80 - 5.20 M/uL    HGB 11.0 (L) 11.5 - 16.0 g/dL    HCT 33.7 (L) 35.0 - 47.0 %    MCV 89.2 80.0 - 99.0 FL    MCH 29.1 26.0 - 34.0 PG    MCHC 32.6 30.0 - 36.5 g/dL    RDW 14.7 (H) 11.5 - 14.5 % PLATELET 093 (L) 590 - 400 K/uL    MPV 12.9 8.9 - 12.9 FL    NRBC 0.0 0.0  WBC    ABSOLUTE NRBC 0.00 0.00 - 0.01 K/uL    NEUTROPHILS 58 32 - 75 %    LYMPHOCYTES 31 12 - 49 %    MONOCYTES 6 5 - 13 %    EOSINOPHILS 3 0 - 7 %    BASOPHILS 1 0 - 1 %    IMMATURE GRANULOCYTES 1 (H) 0 - 0.5 %    ABS. NEUTROPHILS 4.0 1.8 - 8.0 K/UL    ABS. LYMPHOCYTES 2.1 0.8 - 3.5 K/UL    ABS. MONOCYTES 0.4 0.0 - 1.0 K/UL    ABS. EOSINOPHILS 0.2 0.0 - 0.4 K/UL    ABS. BASOPHILS 0.0 0.0 - 0.1 K/UL    ABS. IMM. GRANS. 0.0 0.00 - 0.04 K/UL    DF AUTOMATED     METABOLIC PANEL, COMPREHENSIVE    Collection Time: 08/02/22  5:53 AM   Result Value Ref Range    Sodium 140 136 - 145 mmol/L    Potassium 4.0 3.5 - 5.1 mmol/L    Chloride 107 97 - 108 mmol/L    CO2 27 21 - 32 mmol/L    Anion gap 6 5 - 15 mmol/L    Glucose 92 65 - 100 mg/dL    BUN 17 6 - 20 mg/dL    Creatinine 0.84 0.55 - 1.02 mg/dL    BUN/Creatinine ratio 20 12 - 20      GFR est AA >60 >60 ml/min/1.73m2    GFR est non-AA >60 >60 ml/min/1.73m2    Calcium 8.6 8.5 - 10.1 mg/dL    Bilirubin, total 0.9 0.2 - 1.0 mg/dL    AST (SGOT) 45 (H) 15 - 37 U/L    ALT (SGPT) 81 (H) 12 - 78 U/L    Alk. phosphatase 101 45 - 117 U/L    Protein, total 6.3 (L) 6.4 - 8.2 g/dL    Albumin 3.0 (L) 3.5 - 5.0 g/dL    Globulin 3.3 2.0 - 4.0 g/dL    A-G Ratio 0.9 (L) 1.1 - 2.2           Chest X-Ray:   CT ABD PELV W CONT   Final Result   Mild sigmoid colonic diverticulitis. There is no abscess or drainable fluid   collection. Minimal inflammatory stranding in the right inguinal region are likely   postprocedural in nature. Additional incidental/nonemergent findings are as described above.       IR THROMB MECH ARTERIAL PRIM NON PAULINE OR INTRACRANIAL ADDL   Final Result      Successful aspiration thrombectomy of acute to subacute thrombus in the right   lower lobe, right main pulmonary artery, left lower lobe, and left main   pulmonary artery, with marked improvement of bilateral lower lobe perfusion post   thrombectomy. DUPLEX LOWER EXT VENOUS BILAT   Final Result      CTA CHEST W OR W WO CONT   Final Result   1. Bilateral central, segmental, and subsegmental pulmonary emboli with   moderate-severe clot burden. 2.  Elevated RV/LV ratio consistent with significant right heart strain. 3.  No acute airspace disease. CT HEAD WO CONT   Final Result   No acute intracranial process. XR CHEST PORT   Final Result   No acute process. IR ANGIO PULMONARY BI    (Results Pending)   IR INTRO CATH PUL ART SEG / SUBSEGMENT    (Results Pending)   IR INTRO CATH PUL ART SEG / SUBSEGMENT    (Results Pending)       CT imaging:  CT Results  (Last 48 hours)      None            PFTs:   PFT Results  (Last 3 results in the past 10 years)      None              Assessment:     Patient is a 70-year-old -American female with history of obesity, hypertension, and hyperlipidemia who presented to the hospital on 7/26/2022 with worsening shortness of breath, left-sided chest pressure, and dizziness and was found to have large volume bilateral pulmonary emboli.     Plan:     1.)  Acute hypoxic respiratory failure  -Secondary to submassive acute pulmonary emboli, now improved status post thrombectomy with IR on 7/28/2022    Now on room air  Tolerating well     2.)  Bilateral pulmonary emboli with right lower extremity DVT  -Moderate to severe clot burden in bilateral pulmonary arteries, now status post thrombectomy with adequate response    -Does have a brother with clots, recent trip to [place] in early July  -Lower extremity venous Doppler demonstrating thrombus in the right popliteal vein/posterior tibial vein  -Close follow-up with hematology in the outpatient setting, hypercoagulable work-up pending    Off IV heparin drip  On Xarelto    3.)  Elevated troponin  -Troponin elevated to 341 on admission, peaked at 386, now down to 152  -Secondary to acute PE, doubt ACS  -TTE revealed an EF of 65 to 70%, abnormal diastolic function, moderately dilated RV, decreased systolic function of the RV, and moderate to severe tricuspid valve regurgitation.  -Radiology following closely, continue on heparin drip. Transition to Xarelto at discharge.     4.)  Transaminitis  -,  on admission; doubt viral hepatitis  -Suspect secondary to poor perfusion from pulmonary emboli  -Repeat LFTs are normalizing    5.)  Thrombocytopenia  -Platelet count 97 on admission, suspect secondary to clot formation  -Now improving to 124 today  -Hematology following closely, appreciate their assistance    PT OT evaluation  Out of bed to chair with support as tolerated    Stable for discharge from pulmonary standpoint  Follow-up with pulmonary after discharge in 2 to 3 weeks      CODE STATUS: Full Code    Total time spent with patient: 30 minutes      Moe Brewer MD  [facility]  8/2/2022

## 2022-08-02 NOTE — PROGRESS NOTES
Hematology and Oncology Progress Note    Patient: Mariann Loredo MRN: [id number]  SSN: [id number]    YOB: 1950  Age: 67 y.o. Sex: female      Admit Date: 7/26/2022    LOS: 7 days     Chief Complaint: Patient has fatigue    Subjective:     Patient is feeling much better. No mucosal bleeding. Breathing is better. Objective:     Vitals:    08/01/22 1444 08/01/22 2100 08/01/22 2345 08/02/22 0708   BP: 131/76 (!) 142/83 138/78 125/71   Pulse: 80 80 78 72   Resp: 18 18 18 18   Temp: 98.4 °F (36.9 °C) 98.5 °F (36.9 °C) 98.4 °F (36.9 °C) 97.7 °F (36.5 °C)   SpO2: 94% 96% 98% 97%   Weight:       Height:              Physical Exam:   Constitutional: Elderly female not in any acute distress or pain. Eyes: Sclerae anicteric. Conjunctivae shows pallor. ENMT: Oral mucosa is moist, no thrush, mucositis, or petechiae. Neck: No adenopathy. Respiratory: Lungs are clear bilaterally. Cardiovascular: Normal sinus rhythm. Abdomen: Soft. Nontender. No hepatosplenomegaly. No guarding or rigidity. Bowel sounds present. Extremities: No edema. Skin: No petechiae; no skin rash. Neurologic: Alert/oriented x 3. Lab/Data Review:    Recent Results (from the past 24 hour(s))   CBC WITH AUTOMATED DIFF    Collection Time: 08/02/22  5:53 AM   Result Value Ref Range    WBC 6.7 3.6 - 11.0 K/uL    RBC 3.78 (L) 3.80 - 5.20 M/uL    HGB 11.0 (L) 11.5 - 16.0 g/dL    HCT 33.7 (L) 35.0 - 47.0 %    MCV 89.2 80.0 - 99.0 FL    MCH 29.1 26.0 - 34.0 PG    MCHC 32.6 30.0 - 36.5 g/dL    RDW 14.7 (H) 11.5 - 14.5 %    PLATELET 379 (L) 355 - 400 K/uL    MPV 12.9 8.9 - 12.9 FL    NRBC 0.0 0.0  WBC    ABSOLUTE NRBC 0.00 0.00 - 0.01 K/uL    NEUTROPHILS 58 32 - 75 %    LYMPHOCYTES 31 12 - 49 %    MONOCYTES 6 5 - 13 %    EOSINOPHILS 3 0 - 7 %    BASOPHILS 1 0 - 1 %    IMMATURE GRANULOCYTES 1 (H) 0 - 0.5 %    ABS. NEUTROPHILS 4.0 1.8 - 8.0 K/UL    ABS. LYMPHOCYTES 2.1 0.8 - 3.5 K/UL    ABS.  MONOCYTES 0.4 0.0 - 1.0 K/UL ABS. EOSINOPHILS 0.2 0.0 - 0.4 K/UL    ABS. BASOPHILS 0.0 0.0 - 0.1 K/UL    ABS. IMM. GRANS. 0.0 0.00 - 0.04 K/UL    DF AUTOMATED     METABOLIC PANEL, COMPREHENSIVE    Collection Time: 08/02/22  5:53 AM   Result Value Ref Range    Sodium 140 136 - 145 mmol/L    Potassium 4.0 3.5 - 5.1 mmol/L    Chloride 107 97 - 108 mmol/L    CO2 27 21 - 32 mmol/L    Anion gap 6 5 - 15 mmol/L    Glucose 92 65 - 100 mg/dL    BUN 17 6 - 20 mg/dL    Creatinine 0.84 0.55 - 1.02 mg/dL    BUN/Creatinine ratio 20 12 - 20      GFR est AA >60 >60 ml/min/1.73m2    GFR est non-AA >60 >60 ml/min/1.73m2    Calcium 8.6 8.5 - 10.1 mg/dL    Bilirubin, total 0.9 0.2 - 1.0 mg/dL    AST (SGOT) 45 (H) 15 - 37 U/L    ALT (SGPT) 81 (H) 12 - 78 U/L    Alk. phosphatase 101 45 - 117 U/L    Protein, total 6.3 (L) 6.4 - 8.2 g/dL    Albumin 3.0 (L) 3.5 - 5.0 g/dL    Globulin 3.3 2.0 - 4.0 g/dL    A-G Ratio 0.9 (L) 1.1 - 2.2             Assessment and plan:     1) Thrombocytopenia:Platelets improving. Monitor. Most likely due to platelet consumption from thrombosis. B12 normal.  -Patient's platelet count has improved to 146,000 today. 2) Bilateral  pulmonary emboli :moderate to severe with right ventricular strain:pt on IV heparin. S/p mechanical thrombectomy . Hypercoagulable work up as out pt. H/o recent travel. CT abdomen,pelvis negative for malignancy. Pt needs life long anticogulation due to severe PE unless any bleeding. Xarelto as out pt. 3) Anemia: Hemoglobin has improved to 11 today. -Iron studies shows vitamin B12 of 337, folate 17, iron 76, TIBC 284, iron saturation of 27% and ferritin of 280. Patient's haptoglobin was within normal limits.    -Patient to continue to follow Dr. Winda Eng on outpatient basis. This dictation was done by dragon, computer voice recognition software. Often unanticipated grammatical, syntax, phones and other interpretive errors are inadvertently transcribed.   Please excuse errors that have escaped final proofreading.        Signed By: Humza Car MD     August 2, 2022

## 2022-08-02 NOTE — PROGRESS NOTES
CM reviewed chart, noted discharge order. Patient is clear to discharge home self care by CM. Nurse is aware. Discharge plan of care/case management plan validated with provider discharge order. Medicare pt has received, reviewed, and signed 2nd IM letter informing them of their right to appeal the discharge. Signed copied has been placed on pt bedside chart.

## 2022-08-02 NOTE — PROGRESS NOTES
Problem: Falls - Risk of  Goal: *Absence of Falls  Description: Document Ethan Iraheta Fall Risk and appropriate interventions in the flowsheet.   Outcome: Progressing Towards Goal  Note: Fall Risk Interventions:  Mobility Interventions: Bed/chair exit alarm         Medication Interventions: Bed/chair exit alarm    Elimination Interventions: Bed/chair exit alarm    History of Falls Interventions: Bed/chair exit alarm, Evaluate medications/consider consulting pharmacy, Door open when patient unattended

## 2022-08-02 NOTE — PROGRESS NOTES
Spiritual Care Assessment/Progress Note  [facility]      NAME: Ashlyn Palm      MRN: [id number]  AGE: 67 y.o. SEX: female  Buddhist Affiliation: Episcopal   Language: English     8/2/2022     Total Time (in minutes): 10     Spiritual Assessment begun in [facility] through conversation with:         [x]Patient        [] Family    [] Friend(s)        Reason for Consult: Initial visit     Spiritual beliefs: (Please include comment if needed)     [] Identifies with a abril tradition:         [] Supported by a abril community:            [] Claims no spiritual orientation:           [] Seeking spiritual identity:                [] Adheres to an individual form of spirituality:           [x] Not able to assess:                           Identified resources for coping:      [] Prayer                               [] Music                  [] Guided Imagery     [] Family/friends                 [] Pet visits     [] Devotional reading                         [x] Unknown     [] Other:                                             Interventions offered during this visit: (See comments for more details)    Patient Interventions: Initial visit, Other (comment) (Silent support and prayer)           Plan of Care:     [] Support spiritual and/or cultural needs    [] Support AMD and/or advance care planning process      [] Support grieving process   [] Coordinate Rites and/or Rituals    [] Coordination with community clergy   [] No spiritual needs identified at this time   [] Detailed Plan of Care below (See Comments)  [] Make referral to Music Therapy  [] Make referral to Pet Therapy     [] Make referral to Addiction services  [] Make referral to [facility]  [] Make referral to Spiritual Care Partner  [] No future visits requested        [x] Contact Spiritual Care for further referrals     Comments:  Visited patient in [place] [address] for initial assessment.   Medical staff were providing care for patient during the visit. No visitors were present. Provided silent support and prayer. Contact chaplains for further referrals. Chaplain Karie Miller M.Div.     can be reached by calling the  at [facility]   [phone]

## 2022-08-02 NOTE — PROGRESS NOTES
Progress Note      8/2/2022 12:50 PM  NAME: Shailesh Nj   MRN:  [id number]   Admit Diagnosis: NSTEMI (non-ST elevated myocardial infarction) [facility]) [I21.4]      Subjective:   Chart reviewed. Discussed with the patient. Patient has a shortness of breath and has some improvement. She is found to have pulmonary embolism with a moderate burden of the clot. Patient had a thrombectomy performed and feels better. Abdominal CAT scan did reveal small abdominal aortic aneurysm. Patient is feeling much better. Review of Systems:    Symptom Y/N Comments  Symptom Y/N Comments   Fever/Chills n   Chest Pain n    Poor Appetite    Edema     Cough    Abdominal Pain n    Sputum    Joint Pain     SOB/RAHMAN y Has significant improvement  Pruritis/Rash     Nausea/vomit    Other     Diarrhea         Constipation           Could NOT obtain due to:      Objective:          Physical Exam:    Last 24hrs VS reviewed since prior progress note. Most recent are:    Visit Vitals  /71 (BP 1 Location: Left upper arm, BP Patient Position: At rest)   Pulse 72   Temp 97.7 °F (36.5 °C)   Resp 18   Ht 5' 6.14\" (1.68 m)   Wt 90.7 kg (199 lb 15.3 oz)   SpO2 97%   BMI 32.14 kg/m²       Intake/Output Summary (Last 24 hours) at 8/2/2022 1222  Last data filed at 8/2/2022 6462  Gross per 24 hour   Intake --   Output 476 ml   Net -476 ml          General Appearance: Well developed, well nourished, alert & oriented x 3,    no acute distress. Ears/Nose/Mouth/Throat: Hearing grossly normal.  Neck: Supple. Chest: Lungs clear to auscultation bilaterally. Cardiovascular: Regular rate and rhythm, S1,S2 normal, no murmur. Abdomen: Soft, non-tender, bowel sounds are active. Extremities: No edema bilaterally. Skin: Warm and dry. []         Post-cath site without hematoma, bruit, tenderness, or thrill. Distal pulses intact.     PMH/SH reviewed - no change compared to H&P    Data Review    Telemetry: normal sinus rhythm     EKG:   []  No new EKG for review    Lab Data Personally Reviewed:    Recent Labs     08/02/22  0553 08/01/22  0748   WBC 6.7 5.9   HGB 11.0* 10.9*   HCT 33.7* 33.4*   * 133*       Recent Labs     07/31/22  1320 07/31/22  0547 07/30/22  2258   APTT 75.8* 96.2* 116.9*        Recent Labs     08/02/22  0553 08/01/22  0748 07/31/22  0800 07/31/22  0547    142  --  140   K 4.0 4.0 3.6 Hemolyzed, recollect requested    109*  --  111*   CO2 27 26  --  23   BUN 17 10  --  11   CREA 0.84 0.63  --  0.52*   GLU 92 101*  --  117*   CA 8.6 9.0  --  8.2*       No results for input(s): CPK, CKNDX, TROIQ in the last 72 hours. No lab exists for component: CPKMB  No results found for: CHOL, CHOLX, CHLST, CHOLV, HDL, HDLP, LDL, LDLC, DLDLP, TGLX, TRIGL, TRIGP, CHHD, CHHDX    Recent Labs     08/02/22  0553      TP 6.3*   ALB 3.0*   GLOB 3.3       No results for input(s): PH, PCO2, PO2 in the last 72 hours.     Medications Personally Reviewed:    Current Facility-Administered Medications   Medication Dose Route Frequency    metoprolol succinate (TOPROL-XL) XL tablet 50 mg  50 mg Oral DAILY    rivaroxaban (XARELTO) tablet 15 mg  15 mg Oral BID WITH MEALS    levoFLOXacin (LEVAQUIN) tablet 500 mg  500 mg Oral Q24H    pitavastatin calcium (LIVALO) tablet 4 mg (Patient Supplied)  4 mg Oral DAILY WITH DINNER    fentaNYL citrate (PF) injection 12.5-50 mcg  12.5-50 mcg IntraVENous Multiple    sodium chloride (NS) flush 5-40 mL  5-40 mL IntraVENous Q8H    sodium chloride (NS) flush 5-40 mL  5-40 mL IntraVENous PRN    acetaminophen (TYLENOL) tablet 650 mg  650 mg Oral Q6H PRN    Or    acetaminophen (TYLENOL) suppository 650 mg  650 mg Rectal Q6H PRN    polyethylene glycol (MIRALAX) packet 17 g  17 g Oral DAILY PRN    ondansetron (ZOFRAN ODT) tablet 4 mg  4 mg Oral Q8H PRN    Or    ondansetron (ZOFRAN) injection 4 mg  4 mg IntraVENous Q6H PRN    nitroglycerin (NITROSTAT) tablet 0.4 mg  0.4 mg SubLINGual PRN    losartan (COZAAR) tablet 25 mg  25 mg Oral DAILY             Problem List:   Patient has a pulmonary embolism. Troponin I is minimally elevated and pulmonary pressure is about 47 mmHg and right ventricle is mildly dilated with mild hypokinesis. Hypertension. Patient had a thrombectomy. Patient has a small abdominal aortic aneurysm. Assessment/Plan:   Recommend anticoagulation. We will follow recommendation of work-up by the hematologist to evaluate for hypercoagulable state. Discussed extensively with the patient. Patient can be started on oral anticoagulation and can be discharged from cardiac viewpoint and I will be happy to follow as an outpatient or she can see the cardiologist she has seen in the past.  Thank you. []       High complexity decision making was performed in this patient at high risk for decompensation with multiple organ involvement.     Burke Villlaba MD

## 2022-08-02 NOTE — PROGRESS NOTES
No appointment made with Oncology this will be a new patient to them  so. New patient coordinator will call patient. For, appointment with Dr. Tariq Galdamez or Connor Mata.

## 2022-08-02 NOTE — DISCHARGE SUMMARY
Admit date: 7/26/2022   Admitting Provider: Alma Montilla MD    Discharge date: 8/2/2022  Discharging Provider: Darylene Rosier, MD      * Admission Diagnoses: NSTEMI (non-ST elevated myocardial infarction) [facility]) [I21.4]    * Discharge Diagnoses:    Hospital Problems as of 8/2/2022 Date Reviewed: 8/2/2022            Codes Class Noted - Resolved POA    * (Principal) Bilateral pulmonary embolism ([facility] [address] ICD-10-CM: I26.99  ICD-9-CM: 415.19  8/2/2022 - Present Yes        Acute deep vein thrombosis (DVT) of right lower extremity ([facility] [address] ICD-10-CM: I82.401  ICD-9-CM: 453.40  8/2/2022 - Present Yes        RESOLVED: NSTEMI (non-ST elevated myocardial infarction) ([facility] [address] ICD-10-CM: I21.4  ICD-9-CM: 410.70  7/26/2022 - 8/2/2022 Yes           * Hospital Course:   7/26/22 admission course  Reid Pena is a 67 y.o. female with PMH of hypertension, HLP presented to the ED with a chief complaint of shortness of breath started yesterday. Worsening with mild exertion, associated with dizziness and left-sided chest pressure. Chest pressure is rated 5/10. Also has LE swelling, but attributes to previous knee surgery. Otherwise denies productive cough, wheezing, fever or chills. In ED, placed on 2 L nasal cannula. Troponin elevated at 300s. EKG showed no ST changes. Hemoglobin 8.9, plt 97, no baseline to compare. 7/27/22 no new complaints, tolerating medications well  Tolerating heparin drip well  Case discussed at bedside with her cardiologist Dr Viola Juarez by telephone        7/27/22 echocardiogram  Result status: Final result     Left Ventricle: Normal left ventricular systolic function with a visually estimated EF of 65 - 70%. Left ventricle size is normal. Increased wall thickness. Normal wall motion. Abnormal diastolic function. Right Ventricle: Right ventricle is moderately dilated. Normal wall thickness. Mildly hypokinetic. Pulmonary pressure is 47 mmHg. Mitral Valve: Mild regurgitation.     Tricuspid Valve: Moderate to severe regurgitation. 7/27/22 CTA CHEST  IMPRESSION  1. Bilateral central, segmental, and subsegmental pulmonary emboli with  moderate-severe clot burden. 2.  Elevated RV/LV ratio consistent with significant right heart strain. 3.  No acute airspace disease. 7/28/22 INTERVENTIONAL RADIOLOGY  This is a 67 y.o. female with submassive bilateral PE with mild right heart strain who remains hemodynamically stable however has had minimal improvement in her symptoms (chest pressure, shortness of breath) since initiating Heparin gtt. BLE venous duplex is pending. Procedure to be performed:  PE mechanical thrombectomy  Patient to remain NPO  Plan for sedation:  moderate  Post procedure plan:  observation per protocol  Informed consent:  risks, benefits, and alternatives reviewed with the patient / family who agree to proceed  Code status:  Full Code     7/28/22 Venous duplex lower extremities  Thrombus present in the right popliteal vein. Thrombus present in the right posterior tibial vein. No evidence of deep vein thrombosis in the left lower extremity. 7/28/22 she is to undergo interventional radiology procedure today    7/28/22 IR thrombectomy  IMPRESSION     Successful aspiration thrombectomy of acute to subacute thrombus in the right  lower lobe, right main pulmonary artery, left lower lobe, and left main  pulmonary artery, with marked improvement of bilateral lower lobe perfusion post  thrombectomy.     8/1/22 GASTRO  Mild abdominal discomfort, sigmoid diverticulosis, diverticulitis,  Continue current Xarelto treatment  Hematology to follow,  Antibiotic treatment  Stool softener   We will follow the patient periodically,  Likely patient need an outpatient colonoscopy after PE being treated after couple weeks    8/2/22 discharge home on Xarelto  Outpatient follow up with PCP, cardiology, pulmonary, hematology, gastro      * Procedures:   * No surgery found *  Cardiology and IR Orders (From admission to next 24h)       Start     Ordered    07/28/22 1715  IR ANGIO PULMONARY BI  [[id number]]  ONE TIME         07/28/22 1700    07/28/22 1715  IR INTRO CATH PUL ART SEG / SUBSEGMENT  [[id number]]  ONE TIME         07/28/22 1701 07/28/22 1715  IR INTRO CATH PUL ART SEG / SUBSEGMENT  [[id number]]  ONE TIME         07/28/22 1702 07/28/22 1045  IR THROMB MECH ARTERIAL PRIM NON PAULINE OR INTRACRANIAL ADDL  [[id number]]  ONE TIME         07/28/22 1044                    Consults: Cardiology, Pulmonary/Intensive care, GI, and Hematology/Oncology    Significant Diagnostic Studies:   Recent Results (from the past 24 hour(s))   CBC WITH AUTOMATED DIFF    Collection Time: 08/02/22  5:53 AM   Result Value Ref Range    WBC 6.7 3.6 - 11.0 K/uL    RBC 3.78 (L) 3.80 - 5.20 M/uL    HGB 11.0 (L) 11.5 - 16.0 g/dL    HCT 33.7 (L) 35.0 - 47.0 %    MCV 89.2 80.0 - 99.0 FL    MCH 29.1 26.0 - 34.0 PG    MCHC 32.6 30.0 - 36.5 g/dL    RDW 14.7 (H) 11.5 - 14.5 %    PLATELET 888 (L) 497 - 400 K/uL    MPV 12.9 8.9 - 12.9 FL    NRBC 0.0 0.0  WBC    ABSOLUTE NRBC 0.00 0.00 - 0.01 K/uL    NEUTROPHILS 58 32 - 75 %    LYMPHOCYTES 31 12 - 49 %    MONOCYTES 6 5 - 13 %    EOSINOPHILS 3 0 - 7 %    BASOPHILS 1 0 - 1 %    IMMATURE GRANULOCYTES 1 (H) 0 - 0.5 %    ABS. NEUTROPHILS 4.0 1.8 - 8.0 K/UL    ABS. LYMPHOCYTES 2.1 0.8 - 3.5 K/UL    ABS. MONOCYTES 0.4 0.0 - 1.0 K/UL    ABS. EOSINOPHILS 0.2 0.0 - 0.4 K/UL    ABS. BASOPHILS 0.0 0.0 - 0.1 K/UL    ABS. IMM.  GRANS. 0.0 0.00 - 0.04 K/UL    DF AUTOMATED     METABOLIC PANEL, COMPREHENSIVE    Collection Time: 08/02/22  5:53 AM   Result Value Ref Range    Sodium 140 136 - 145 mmol/L    Potassium 4.0 3.5 - 5.1 mmol/L    Chloride 107 97 - 108 mmol/L    CO2 27 21 - 32 mmol/L    Anion gap 6 5 - 15 mmol/L    Glucose 92 65 - 100 mg/dL    BUN 17 6 - 20 mg/dL    Creatinine 0.84 0.55 - 1.02 mg/dL    BUN/Creatinine ratio 20 12 - 20      GFR est AA >60 >60 ml/min/1.73m2    GFR est non-AA >60 >60 ml/min/1.73m2    Calcium 8.6 8.5 - 10.1 mg/dL    Bilirubin, total 0.9 0.2 - 1.0 mg/dL    AST (SGOT) 45 (H) 15 - 37 U/L    ALT (SGPT) 81 (H) 12 - 78 U/L    Alk. phosphatase 101 45 - 117 U/L    Protein, total 6.3 (L) 6.4 - 8.2 g/dL    Albumin 3.0 (L) 3.5 - 5.0 g/dL    Globulin 3.3 2.0 - 4.0 g/dL    A-G Ratio 0.9 (L) 1.1 - 2.2         Discharge Exam:  Patient Vitals for the past 24 hrs:   Temp Pulse Resp BP SpO2   08/02/22 0708 97.7 °F (36.5 °C) 72 18 125/71 97 %   08/01/22 2345 98.4 °F (36.9 °C) 78 18 138/78 98 %   08/01/22 2100 98.5 °F (36.9 °C) 80 18 (!) 142/83 96 %   08/01/22 1444 98.4 °F (36.9 °C) 80 18 131/76 94 %   NAD, AxOx3  Neck supple  Lungs good bilat breath sounds; O2 Sat 99% RA  Heart S1S2, sinus  Abd soft; right groin minimal/small knot  Vascular no edema; Right Dorsalis palpable  Neuro no focal motor deficits  Psych good and appropriate affect      * Discharge Condition: stable  * Disposition: Home    Discharge Medications:  Current Discharge Medication List        START taking these medications    Details   rivaroxaban (Xarelto DVT-PE Treat 30d Start) 15 mg (42)- 20 mg (9) DsPk Take one 15 mg tablet twice a day with food for the first 21 days. Then, take one 20 mg tablet once a day with food for 9 days. Indications: blood clot in a deep vein of the extremities, a clot in the lung  Qty: 1 Dose Pack, Refills: 0  Start date: 8/2/2022           CONTINUE these medications which have CHANGED    Details   metoprolol succinate (TOPROL-XL) 50 mg XL tablet Take 1 Tablet by mouth in the morning for 30 days. Qty: 30 Tablet, Refills: 0  Start date: 8/3/2022, End date: 9/2/2022      pitavastatin calcium (Livalo) 4 mg tab tablet Take 1 Tablet by mouth daily (with dinner) for 30 days. Qty: 30 Tablet, Refills: 0  Start date: 8/2/2022, End date: 9/1/2022           CONTINUE these medications which have NOT CHANGED    Details   losartan (COZAAR) 25 mg tablet Take 25 mg by mouth in the morning.       icosapent ethyL (VASCEPA) 1 gram capsule Take 2 Capsules by mouth two (2) times daily (with meals). acetaminophen (TYLENOL) 500 mg capsule Take 500 mg by mouth as needed for Pain. * Follow-up Care/Patient Instructions:   Activity: Activity as tolerated and no driving for today  Diet: Cardiac Diet  Wound Care: Keep wound clean and dry    Follow-up Information       Follow up With Specialties Details Why Contact Info    Lokesh Davis DO [facility]   [address] [facility] [address] [phone]      [facility], [facility] Vascular Surgery, Cardiovascular Disease Physician Follow up in 1 week(s)  [address]  [phone]      Stephan Quiroz MD Hematology and Oncology Follow up in 2 week(s)  [address] [address]  [address], [address], DO Pulmonary Critical Care Follow up on 9/13/2022  Critical access hospital  [phone]              Signed:  Merlin Hopkins MD  8/2/2022  12:29 PM

## 2022-08-19 ENCOUNTER — TRANSCRIBE ORDER (OUTPATIENT)
Dept: SCHEDULING | Age: 72
End: 2022-08-19

## 2022-08-19 DIAGNOSIS — R22.43 LOCALIZED SWELLING OF BOTH LOWER LEGS: Primary | ICD-10-CM

## 2022-08-19 DIAGNOSIS — I82.401 DVT, RECURRENT, LOWER EXTREMITY, ACUTE, RIGHT (HCC): ICD-10-CM

## 2022-08-22 ENCOUNTER — HOSPITAL ENCOUNTER (OUTPATIENT)
Dept: NON INVASIVE DIAGNOSTICS | Age: 72
Discharge: HOME | End: 2022-08-22
Attending: INTERNAL MEDICINE
Payer: MEDICARE

## 2022-08-22 DIAGNOSIS — R22.43 LOCALIZED SWELLING OF BOTH LOWER LEGS: ICD-10-CM

## 2022-08-22 DIAGNOSIS — I82.401 DVT, RECURRENT, LOWER EXTREMITY, ACUTE, RIGHT (HCC): ICD-10-CM

## 2022-08-22 PROCEDURE — 93970 EXTREMITY STUDY: CPT

## 2022-09-21 ENCOUNTER — HOSPITAL ENCOUNTER (OUTPATIENT)
Dept: GENERAL RADIOLOGY | Age: 72
Discharge: HOME | End: 2022-09-21
Payer: MEDICARE

## 2022-09-21 ENCOUNTER — TRANSCRIBE ORDER (OUTPATIENT)
Dept: OTHER | Age: 72
End: 2022-09-21

## 2022-09-21 DIAGNOSIS — I26.99 IATROGENIC PULMONARY EMBOLISM AND INFARCTION (HCC): ICD-10-CM

## 2022-09-21 DIAGNOSIS — I82.401 DEEP VEIN THROMBOSIS OF RIGHT LOWER LIMB (HCC): ICD-10-CM

## 2022-09-21 DIAGNOSIS — I26.99 IATROGENIC PULMONARY EMBOLISM AND INFARCTION (HCC): Primary | ICD-10-CM

## 2022-09-21 DIAGNOSIS — T81.718A IATROGENIC PULMONARY EMBOLISM AND INFARCTION (HCC): Primary | ICD-10-CM

## 2022-09-21 DIAGNOSIS — T81.718A IATROGENIC PULMONARY EMBOLISM AND INFARCTION (HCC): ICD-10-CM

## 2022-09-21 PROCEDURE — 73502 X-RAY EXAM HIP UNI 2-3 VIEWS: CPT

## 2022-11-17 ENCOUNTER — TRANSCRIBE ORDER (OUTPATIENT)
Dept: SCHEDULING | Age: 72
End: 2022-11-17

## 2022-11-17 DIAGNOSIS — I82.401 DEEP VEIN THROMBOSIS OF RIGHT LOWER LIMB (HCC): ICD-10-CM

## 2022-11-17 DIAGNOSIS — T81.718A IATROGENIC PULMONARY EMBOLISM AND INFARCTION (HCC): Primary | ICD-10-CM

## 2022-11-17 DIAGNOSIS — I26.99 IATROGENIC PULMONARY EMBOLISM AND INFARCTION (HCC): Primary | ICD-10-CM

## 2022-12-02 ENCOUNTER — HOSPITAL ENCOUNTER (OUTPATIENT)
Dept: CT IMAGING | Age: 72
End: 2022-12-02
Attending: INTERNAL MEDICINE
Payer: MEDICARE

## 2022-12-02 DIAGNOSIS — T81.718A IATROGENIC PULMONARY EMBOLISM AND INFARCTION (HCC): ICD-10-CM

## 2022-12-02 DIAGNOSIS — I82.401 DEEP VEIN THROMBOSIS OF RIGHT LOWER LIMB (HCC): ICD-10-CM

## 2022-12-02 DIAGNOSIS — I26.99 IATROGENIC PULMONARY EMBOLISM AND INFARCTION (HCC): ICD-10-CM

## 2022-12-02 PROCEDURE — 74011000636 HC RX REV CODE- 636: Performed by: INTERNAL MEDICINE

## 2022-12-02 PROCEDURE — 71275 CT ANGIOGRAPHY CHEST: CPT

## 2022-12-02 RX ADMIN — IOPAMIDOL 100 ML: 755 INJECTION, SOLUTION INTRAVENOUS at 08:25

## 2022-12-05 ENCOUNTER — TRANSCRIBE ORDER (OUTPATIENT)
Dept: SCHEDULING | Age: 72
End: 2022-12-05

## 2022-12-05 DIAGNOSIS — Z12.31 SCREENING MAMMOGRAM FOR HIGH-RISK PATIENT: Primary | ICD-10-CM

## 2022-12-06 ENCOUNTER — HOSPITAL ENCOUNTER (OUTPATIENT)
Dept: MAMMOGRAPHY | Age: 72
Discharge: HOME | End: 2022-12-06
Payer: MEDICARE

## 2022-12-06 DIAGNOSIS — Z12.31 SCREENING MAMMOGRAM FOR HIGH-RISK PATIENT: ICD-10-CM

## 2022-12-06 PROCEDURE — 77063 BREAST TOMOSYNTHESIS BI: CPT

## 2022-12-09 ENCOUNTER — TRANSCRIBE ORDER (OUTPATIENT)
Dept: SCHEDULING | Age: 72
End: 2022-12-09

## 2022-12-09 DIAGNOSIS — Z86.711 HISTORY OF PULMONARY EMBOLISM: Primary | ICD-10-CM

## 2023-02-27 ENCOUNTER — TRANSCRIBE ORDER (OUTPATIENT)
Dept: SCHEDULING | Age: 73
End: 2023-02-27

## 2023-02-27 DIAGNOSIS — R06.02 SOB (SHORTNESS OF BREATH): Primary | ICD-10-CM

## 2023-03-08 ENCOUNTER — HOSPITAL ENCOUNTER (OUTPATIENT)
Dept: NON INVASIVE DIAGNOSTICS | Age: 73
Discharge: HOME | End: 2023-03-08
Attending: INTERNAL MEDICINE
Payer: MEDICARE

## 2023-03-08 DIAGNOSIS — R06.02 SOB (SHORTNESS OF BREATH): ICD-10-CM

## 2023-03-08 LAB
ECHO AO ASC DIAM: 3.3 CM
ECHO AO ROOT DIAM: 2.6 CM
ECHO AV AREA PEAK VELOCITY: 2 CM2
ECHO AV AREA VTI: 2 CM2
ECHO AV MEAN GRADIENT: 6 MMHG
ECHO AV MEAN VELOCITY: 1.1 M/S
ECHO AV PEAK GRADIENT: 12 MMHG
ECHO AV PEAK VELOCITY: 1.8 M/S
ECHO AV VELOCITY RATIO: 0.61
ECHO AV VTI: 42.6 CM
ECHO LA AREA 2C: 18.9 CM2
ECHO LA AREA 4C: 24 CM2
ECHO LA DIAMETER: 3.9 CM
ECHO LA MAJOR AXIS: 5.2 CM
ECHO LA MINOR AXIS: 5.2 CM
ECHO LA TO AORTIC ROOT RATIO: 1.5
ECHO LA VOL BP: 68 ML (ref 22–52)
ECHO LV E' LATERAL VELOCITY: 5 CM/S
ECHO LV E' SEPTAL VELOCITY: 6 CM/S
ECHO LV EDV A2C: 76 ML
ECHO LV EDV A4C: 74 ML
ECHO LV EJECTION FRACTION A2C: 72 %
ECHO LV EJECTION FRACTION A4C: 66 %
ECHO LV EJECTION FRACTION BIPLANE: 69 % (ref 55–100)
ECHO LV ESV A2C: 21 ML
ECHO LV ESV A4C: 25 ML
ECHO LV FRACTIONAL SHORTENING: 38 % (ref 28–44)
ECHO LV INTERNAL DIMENSION DIASTOLIC: 3.7 CM (ref 3.9–5.3)
ECHO LV INTERNAL DIMENSION SYSTOLIC: 2.3 CM
ECHO LV IVSD: 1.1 CM (ref 0.6–0.9)
ECHO LV MASS 2D: 120.8 G (ref 67–162)
ECHO LV POSTERIOR WALL DIASTOLIC: 1 CM (ref 0.6–0.9)
ECHO LV RELATIVE WALL THICKNESS RATIO: 0.54
ECHO LVOT AREA: 3.1 CM2
ECHO LVOT AV VTI INDEX: 0.64
ECHO LVOT DIAM: 2 CM
ECHO LVOT MEAN GRADIENT: 3 MMHG
ECHO LVOT PEAK GRADIENT: 5 MMHG
ECHO LVOT PEAK VELOCITY: 1.1 M/S
ECHO LVOT SV: 85.7 ML
ECHO LVOT VTI: 27.3 CM
ECHO MV A VELOCITY: 0.95 M/S
ECHO MV E DECELERATION TIME (DT): 296 MS
ECHO MV E VELOCITY: 0.76 M/S
ECHO MV E/A RATIO: 0.8
ECHO MV E/E' LATERAL: 15.2
ECHO MV E/E' RATIO (AVERAGED): 13.93
ECHO MV E/E' SEPTAL: 12.67
ECHO RV INTERNAL DIMENSION: 3.9 CM
ECHO TV REGURGITANT MAX VELOCITY: 2.85 M/S
ECHO TV REGURGITANT PEAK GRADIENT: 33 MMHG

## 2023-03-08 PROCEDURE — 93306 TTE W/DOPPLER COMPLETE: CPT

## 2023-07-28 ENCOUNTER — HOSPITAL ENCOUNTER (OUTPATIENT)
Facility: HOSPITAL | Age: 73
Discharge: HOME | End: 2023-07-28
Payer: MEDICARE

## 2023-07-28 DIAGNOSIS — E04.2 NONTOXIC MULTINODULAR GOITER: ICD-10-CM

## 2023-07-28 PROCEDURE — 76536 US EXAM OF HEAD AND NECK: CPT

## 2023-09-05 ENCOUNTER — TRANSCRIBE ORDERS (OUTPATIENT)
Facility: HOSPITAL | Age: 73
End: 2023-09-05

## 2023-09-05 DIAGNOSIS — I26.99 IATROGENIC PULMONARY EMBOLISM AND INFARCTION, INITIAL ENCOUNTER (HCC): ICD-10-CM

## 2023-09-05 DIAGNOSIS — I82.4Y1 DEEP VEIN THROMBOSIS (DVT) OF PROXIMAL VEIN OF RIGHT LOWER EXTREMITY, UNSPECIFIED CHRONICITY (HCC): Primary | ICD-10-CM

## 2023-09-05 DIAGNOSIS — T81.718A IATROGENIC PULMONARY EMBOLISM AND INFARCTION, INITIAL ENCOUNTER (HCC): ICD-10-CM

## 2023-09-13 ENCOUNTER — HOSPITAL ENCOUNTER (OUTPATIENT)
Facility: HOSPITAL | Age: 73
Discharge: HOME | End: 2023-09-15
Attending: INTERNAL MEDICINE
Payer: MEDICARE

## 2023-09-13 DIAGNOSIS — I82.4Y1 DEEP VEIN THROMBOSIS (DVT) OF PROXIMAL VEIN OF RIGHT LOWER EXTREMITY, UNSPECIFIED CHRONICITY (HCC): ICD-10-CM

## 2023-09-13 DIAGNOSIS — T81.718A IATROGENIC PULMONARY EMBOLISM AND INFARCTION, INITIAL ENCOUNTER (HCC): ICD-10-CM

## 2023-09-13 DIAGNOSIS — I26.99 IATROGENIC PULMONARY EMBOLISM AND INFARCTION, INITIAL ENCOUNTER (HCC): ICD-10-CM

## 2023-09-13 PROCEDURE — 93970 EXTREMITY STUDY: CPT

## 2023-10-31 ENCOUNTER — HOSPITAL ENCOUNTER (OUTPATIENT)
Facility: HOSPITAL | Age: 73
Discharge: HOME | End: 2023-11-03
Payer: MEDICARE

## 2023-10-31 DIAGNOSIS — Z78.0 ASYMPTOMATIC MENOPAUSAL STATE: ICD-10-CM

## 2023-10-31 PROCEDURE — 77080 DXA BONE DENSITY AXIAL: CPT

## 2023-12-05 ENCOUNTER — TRANSCRIBE ORDERS (OUTPATIENT)
Facility: HOSPITAL | Age: 73
End: 2023-12-05

## 2023-12-05 DIAGNOSIS — Z12.31 SCREENING MAMMOGRAM FOR HIGH-RISK PATIENT: Primary | ICD-10-CM

## 2023-12-12 ENCOUNTER — HOSPITAL ENCOUNTER (OUTPATIENT)
Facility: HOSPITAL | Age: 73
Discharge: HOME | End: 2023-12-15
Payer: MEDICARE

## 2023-12-12 DIAGNOSIS — Z12.31 SCREENING MAMMOGRAM FOR HIGH-RISK PATIENT: ICD-10-CM

## 2023-12-12 PROCEDURE — 77063 BREAST TOMOSYNTHESIS BI: CPT

## 2024-02-14 ENCOUNTER — HOSPITAL ENCOUNTER (OUTPATIENT)
Facility: HOSPITAL | Age: 74
Discharge: HOME | End: 2024-02-17
Attending: INTERNAL MEDICINE
Payer: MEDICARE

## 2024-02-14 DIAGNOSIS — R31.9 HEMATURIA SYNDROME: ICD-10-CM

## 2024-02-14 DIAGNOSIS — R30.0 DYSURIA: ICD-10-CM

## 2024-02-14 DIAGNOSIS — M54.50 LOW BACK PAIN, UNSPECIFIED BACK PAIN LATERALITY, UNSPECIFIED CHRONICITY, UNSPECIFIED WHETHER SCIATICA PRESENT: ICD-10-CM

## 2024-02-14 LAB — CREAT BLD-MCNC: 0.8 MG/DL (ref 0.6–1.3)

## 2024-02-14 PROCEDURE — 82565 ASSAY OF CREATININE: CPT

## 2024-02-14 PROCEDURE — 6360000004 HC RX CONTRAST MEDICATION: Performed by: INTERNAL MEDICINE

## 2024-02-14 PROCEDURE — 74177 CT ABD & PELVIS W/CONTRAST: CPT

## 2024-02-14 RX ADMIN — DIATRIZOATE MEGLUMINE AND DIATRIZOATE SODIUM 30 ML: 660; 100 LIQUID ORAL; RECTAL at 11:36

## 2024-02-14 RX ADMIN — IOPAMIDOL 100 ML: 755 INJECTION, SOLUTION INTRAVENOUS at 11:35

## 2024-03-20 PROBLEM — N20.1 LEFT URETERAL STONE: Status: ACTIVE | Noted: 2024-03-20

## 2024-03-21 ENCOUNTER — OFFICE VISIT (OUTPATIENT)
Age: 74
End: 2024-03-21
Payer: MEDICARE

## 2024-03-21 ENCOUNTER — TELEPHONE (OUTPATIENT)
Age: 74
End: 2024-03-21

## 2024-03-21 VITALS
HEIGHT: 67 IN | OXYGEN SATURATION: 98 % | BODY MASS INDEX: 31.39 KG/M2 | DIASTOLIC BLOOD PRESSURE: 74 MMHG | RESPIRATION RATE: 16 BRPM | HEART RATE: 84 BPM | WEIGHT: 200 LBS | SYSTOLIC BLOOD PRESSURE: 184 MMHG

## 2024-03-21 DIAGNOSIS — N20.1 LEFT URETERAL STONE: Primary | ICD-10-CM

## 2024-03-21 DIAGNOSIS — R31.0 GROSS HEMATURIA: ICD-10-CM

## 2024-03-21 DIAGNOSIS — I26.99 BILATERAL PULMONARY EMBOLISM (HCC): ICD-10-CM

## 2024-03-21 LAB
BILIRUBIN, URINE, POC: NEGATIVE
BLOOD URINE, POC: NORMAL
GLUCOSE URINE, POC: NEGATIVE
KETONES, URINE, POC: NEGATIVE
LEUKOCYTE ESTERASE, URINE, POC: NEGATIVE
NITRITE, URINE, POC: NEGATIVE
PH, URINE, POC: 7 (ref 4.6–8)
PROTEIN,URINE, POC: 30
SPECIFIC GRAVITY, URINE, POC: 1.01 (ref 1–1.03)
URINALYSIS CLARITY, POC: CLEAR
URINALYSIS COLOR, POC: YELLOW
UROBILINOGEN, POC: NORMAL

## 2024-03-21 PROCEDURE — 1090F PRES/ABSN URINE INCON ASSESS: CPT | Performed by: UROLOGY

## 2024-03-21 PROCEDURE — G8399 PT W/DXA RESULTS DOCUMENT: HCPCS | Performed by: UROLOGY

## 2024-03-21 PROCEDURE — G8417 CALC BMI ABV UP PARAM F/U: HCPCS | Performed by: UROLOGY

## 2024-03-21 PROCEDURE — 3017F COLORECTAL CA SCREEN DOC REV: CPT | Performed by: UROLOGY

## 2024-03-21 PROCEDURE — 1123F ACP DISCUSS/DSCN MKR DOCD: CPT | Performed by: UROLOGY

## 2024-03-21 PROCEDURE — 81003 URINALYSIS AUTO W/O SCOPE: CPT | Performed by: UROLOGY

## 2024-03-21 PROCEDURE — 99203 OFFICE O/P NEW LOW 30 MIN: CPT | Performed by: UROLOGY

## 2024-03-21 PROCEDURE — G8428 CUR MEDS NOT DOCUMENT: HCPCS | Performed by: UROLOGY

## 2024-03-21 PROCEDURE — G8484 FLU IMMUNIZE NO ADMIN: HCPCS | Performed by: UROLOGY

## 2024-03-21 PROCEDURE — 1036F TOBACCO NON-USER: CPT | Performed by: UROLOGY

## 2024-03-21 RX ORDER — ERGOCALCIFEROL 1.25 MG/1
50000 CAPSULE ORAL WEEKLY
COMMUNITY
Start: 2023-03-16

## 2024-03-21 RX ORDER — BUMETANIDE 1 MG/1
1 TABLET ORAL DAILY
COMMUNITY
Start: 2019-07-17

## 2024-03-21 RX ORDER — METOPROLOL SUCCINATE 50 MG/1
50 TABLET, EXTENDED RELEASE ORAL DAILY
COMMUNITY
Start: 2023-10-17

## 2024-03-21 RX ORDER — FOLIC ACID 1 MG/1
1 TABLET ORAL DAILY
COMMUNITY
Start: 2019-07-17

## 2024-03-21 NOTE — PROGRESS NOTES
Chief Complaint   Patient presents with    New Patient    Kidney Problem        BP (!) 184/74   Pulse 84   Resp 16   Ht 1.689 m (5' 6.5\")   Wt 90.7 kg (200 lb)   SpO2 98%   BMI 31.80 kg/m²      PHQ-9 score is    Negative      1. \"Have you been to the ER, urgent care clinic since your last visit?  Hospitalized since your last visit?\" No    2. \"Have you seen or consulted any other health care providers outside of the [facility] since your last visit?\" No     3. For patients aged 45-75: Has the patient had a colonoscopy / FIT/ Cologuard? Yes - no Care Gap present      If the patient is female:    4. For patients aged 40-74: Has the patient had a mammogram within the past 2 years? Yes - no Care Gap present      5. For patients aged 21-65: Has the patient had a pap smear? Yes - no Care Gap present

## 2024-03-21 NOTE — PROGRESS NOTES
HISTORY OF PRESENT ILLNESS  Stacie Atwood is a 73 y.o. female   Patient is relatively asymptomatic 7 mm stone.  She has not passed it yet.  She did have gross hematuria with it.  She is on blood thinners.  She denies fevers chills flank pain nausea vomiting.  Patient is able to go off blood thinners for procedure she says we will set her up for ESWL she is aware the risk of bleeding infection injury to the kidney ureter.  Will obtain another KUB to see if the stone is moved prior to the procedure.  She is also set for cystoscopy possible retrograde if we cannot see the stone on plain films  1. Left ureteral stone  Overview:  Ct on 2/17/2024 reveled:  7 mm stone left proximal ureter resulting in moderate left hydronephrosis and  slightly delayed nephrogram. Small nonobstructing right renal stone     She is s/p left  ESWL on 4/12/2024   KUB 4/19/2024 -Apparent interval passing of left ureteral stone.   Orders:  -     AMB POC URINALYSIS DIP STICK AUTO W/O MICRO  -     XR ABDOMEN (KUB) (SINGLE AP VIEW); Future  2. Bilateral pulmonary embolism (HCC)  3. Gross hematuria        PAST MEDICAL HISTORY  PMHx (including negatives):  has a past medical history of Arthritis, DVT (deep venous thrombosis) (HCC), Hx of blood clots, Hyperlipidemia, Hypertension, and Pulmonary embolus (HCC).   PSurgHx:  has a past surgical history that includes Hysterectomy; IR GUIDED THROMB MECH NONCOR ART SBSQ VESSEL (07/28/2022); joint replacement (Bilateral); Cholecystectomy; Bilateral oophorectomy; Colonoscopy; eye surgery (Bilateral); Multiple tooth extractions; and Lithotripsy (Left, 4/12/2024).  PSocHx:  reports that she has never smoked. She has never used smokeless tobacco. She reports that she does not drink alcohol and does not use drugs.   FamilyHX:   Family History   Problem Relation Age of Onset    Stroke Mother     Prostate Cancer Father     Stroke Father     Thyroid Cancer Sister     Heart Failure Brother        ROS  Review of

## 2024-03-22 ENCOUNTER — PREP FOR PROCEDURE (OUTPATIENT)
Age: 74
End: 2024-03-22

## 2024-03-29 ENCOUNTER — HOSPITAL ENCOUNTER (OUTPATIENT)
Facility: HOSPITAL | Age: 74
End: 2024-03-29
Payer: MEDICARE

## 2024-03-29 DIAGNOSIS — N20.1 LEFT URETERAL STONE: ICD-10-CM

## 2024-03-29 PROCEDURE — 74018 RADEX ABDOMEN 1 VIEW: CPT

## 2024-04-03 ENCOUNTER — HOSPITAL ENCOUNTER (OUTPATIENT)
Facility: HOSPITAL | Age: 74
Discharge: HOME | End: 2024-04-06
Payer: MEDICARE

## 2024-04-03 VITALS
OXYGEN SATURATION: 100 % | TEMPERATURE: 98.1 F | HEIGHT: 65 IN | BODY MASS INDEX: 35.89 KG/M2 | RESPIRATION RATE: 18 BRPM | DIASTOLIC BLOOD PRESSURE: 77 MMHG | SYSTOLIC BLOOD PRESSURE: 148 MMHG | WEIGHT: 215.4 LBS | HEART RATE: 75 BPM

## 2024-04-03 LAB
APTT PPP: 29.5 SEC (ref 21.2–34.1)
EST. AVERAGE GLUCOSE BLD GHB EST-MCNC: 105 MG/DL
HBA1C MFR BLD: 5.3 % (ref 4–5.6)
INR PPP: 1.1 (ref 0.9–1.1)
PROTHROMBIN TIME: 14.6 SEC (ref 11.9–14.6)
THERAPEUTIC RANGE: NORMAL SEC (ref 82–109)

## 2024-04-03 PROCEDURE — 85730 THROMBOPLASTIN TIME PARTIAL: CPT

## 2024-04-03 PROCEDURE — 83036 HEMOGLOBIN GLYCOSYLATED A1C: CPT

## 2024-04-03 PROCEDURE — 36415 COLL VENOUS BLD VENIPUNCTURE: CPT

## 2024-04-03 PROCEDURE — 85610 PROTHROMBIN TIME: CPT

## 2024-04-03 RX ORDER — POTASSIUM CHLORIDE 750 MG/1
10 TABLET, EXTENDED RELEASE ORAL DAILY
COMMUNITY

## 2024-04-03 NOTE — PERIOP NOTE
Patient has hematology clearance on file that states they want primary and cardiac clearance as well.  Faxing forms to Dr. Siegrist (PCP) and Dr. Hu (cardiologist). Will follow up.

## 2024-04-04 NOTE — PERIOP NOTE
Dr. Hu office called, [facility] patient needs an Echo prior to getting cardiac clearance - office has tried to reach patient    Called patient LVM to call [facility] dept    1100 - Patient returned call [facility] has appointment for Echo 4/8/24

## 2024-04-09 ENCOUNTER — ANESTHESIA EVENT (OUTPATIENT)
Facility: HOSPITAL | Age: 74
End: 2024-04-09
Payer: MEDICARE

## 2024-04-11 LAB
AMMONIA 24H UR-SRATE: 12 MMOL/24 HR (ref 15–60)
CA H2 PHOS DIHYD 24H SATFR UR: 0.49 (ref 0.5–2)
CALCIUM 24H UR-MRATE: 77 MG/24 HR
CALCIUM/CREAT 24H UR: 84 MG/G CREAT (ref 51–262)
CALCIUM/KG BODY WEIGHT: 0.8 MG/24 HR/KG
CAOX INDEX 24H UR-RTO: 3.17 (ref 6–10)
CHLORIDE 24H UR-SRATE: 105 MMOL/24 HR (ref 70–250)
CITRATE 24H UR-MRATE: 528 MG/24 HR
CREAT 24H UR-MRATE: 915 MG/24 HR
CREAT/BW 24H UR-RELMRAT: 10.1 MG/24 HR/KG (ref 8.7–20.3)
CYSTINE 24H UR QL: ABNORMAL
LABORATORY COMMENT REPORT: ABNORMAL
MAGNESIUM 24H UR-MRATE: 51 MG/24 HR (ref 30–120)
OXALATE 24H UR-MRATE: 26 MG/24 HR (ref 20–40)
PH 24H UR: 6.11 [PH] (ref 5.8–6.2)
PHOSPHATE 24H UR-MRATE: 814 MG/24 HR (ref 600–1200)
POTASSIUM 24H UR-SRATE: 55 MMOL/24 HR (ref 20–100)
PROTEIN CATABOLIC RATE 24H UR-MRATE: 0.6 G/KG/24 HR (ref 0.8–1.4)
SODIUM 24H UR-SRATE: 109 MMOL/24 HR (ref 50–150)
SPECIMEN VOL 24H UR: 1730 ML/24 HR (ref 500–4000)
SULFATE 24H UR-SRATE: 20 MEQ/24 HR (ref 20–80)
URATE 24H SATFR UR: 0.31
URATE 24H UR-MRATE: 317 MG/24 HR
UUN 24H UR-MRATE: 5.6 G/24 HR (ref 6–14)

## 2024-04-12 ENCOUNTER — HOSPITAL ENCOUNTER (OUTPATIENT)
Facility: HOSPITAL | Age: 74
Discharge: HOME | End: 2024-04-12
Attending: UROLOGY | Admitting: UROLOGY
Payer: MEDICARE

## 2024-04-12 ENCOUNTER — ANESTHESIA (OUTPATIENT)
Facility: HOSPITAL | Age: 74
End: 2024-04-12
Payer: MEDICARE

## 2024-04-12 VITALS
HEART RATE: 70 BPM | TEMPERATURE: 97.8 F | SYSTOLIC BLOOD PRESSURE: 126 MMHG | OXYGEN SATURATION: 96 % | BODY MASS INDEX: 33.32 KG/M2 | RESPIRATION RATE: 16 BRPM | HEIGHT: 65 IN | WEIGHT: 200 LBS | DIASTOLIC BLOOD PRESSURE: 79 MMHG

## 2024-04-12 DIAGNOSIS — G89.18 POST-OP PAIN: Primary | ICD-10-CM

## 2024-04-12 PROBLEM — N20.1 CALCULUS OF URETER: Status: ACTIVE | Noted: 2024-04-12

## 2024-04-12 LAB
CA-I BLD-MCNC: 1.13 MMOL/L (ref 1.12–1.32)
CHLORIDE BLD-SCNC: 102 MMOL/L (ref 98–107)
CREAT UR-MCNC: 0.43 MG/DL (ref 0.6–1.3)
GLUCOSE BLD STRIP.AUTO-MCNC: 93 MG/DL (ref 65–100)
POTASSIUM BLD-SCNC: 3.6 MMOL/L (ref 3.5–5.5)
SODIUM BLD-SCNC: 144 MMOL/L (ref 136–145)

## 2024-04-12 PROCEDURE — 3700000001 HC ADD 15 MINUTES (ANESTHESIA): Performed by: UROLOGY

## 2024-04-12 PROCEDURE — 6370000000 HC RX 637 (ALT 250 FOR IP)

## 2024-04-12 PROCEDURE — 6360000002 HC RX W HCPCS

## 2024-04-12 PROCEDURE — 80047 BASIC METABLC PNL IONIZED CA: CPT

## 2024-04-12 PROCEDURE — 7100000000 HC PACU RECOVERY - FIRST 15 MIN: Performed by: UROLOGY

## 2024-04-12 PROCEDURE — 7100000001 HC PACU RECOVERY - ADDTL 15 MIN: Performed by: UROLOGY

## 2024-04-12 PROCEDURE — 7100000010 HC PHASE II RECOVERY - FIRST 15 MIN: Performed by: UROLOGY

## 2024-04-12 PROCEDURE — 3600000002 HC SURGERY LEVEL 2 BASE: Performed by: UROLOGY

## 2024-04-12 PROCEDURE — 3700000000 HC ANESTHESIA ATTENDED CARE: Performed by: UROLOGY

## 2024-04-12 PROCEDURE — 2709999900 HC NON-CHARGEABLE SUPPLY: Performed by: UROLOGY

## 2024-04-12 PROCEDURE — 7100000011 HC PHASE II RECOVERY - ADDTL 15 MIN: Performed by: UROLOGY

## 2024-04-12 PROCEDURE — 50590 FRAGMENTING OF KIDNEY STONE: CPT | Performed by: UROLOGY

## 2024-04-12 PROCEDURE — 2580000003 HC RX 258: Performed by: UROLOGY

## 2024-04-12 PROCEDURE — 2500000003 HC RX 250 WO HCPCS

## 2024-04-12 PROCEDURE — 6360000002 HC RX W HCPCS: Performed by: UROLOGY

## 2024-04-12 PROCEDURE — 3600000012 HC SURGERY LEVEL 2 ADDTL 15MIN: Performed by: UROLOGY

## 2024-04-12 RX ORDER — PROPOFOL 10 MG/ML
INJECTION, EMULSION INTRAVENOUS PRN
Status: DISCONTINUED | OUTPATIENT
Start: 2024-04-12 | End: 2024-04-12 | Stop reason: SDUPTHER

## 2024-04-12 RX ORDER — SODIUM CHLORIDE, SODIUM LACTATE, POTASSIUM CHLORIDE, CALCIUM CHLORIDE 600; 310; 30; 20 MG/100ML; MG/100ML; MG/100ML; MG/100ML
INJECTION, SOLUTION INTRAVENOUS ONCE
Status: DISCONTINUED | OUTPATIENT
Start: 2024-04-12 | End: 2024-04-12 | Stop reason: HOSPADM

## 2024-04-12 RX ORDER — CIPROFLOXACIN 500 MG/1
500 TABLET, FILM COATED ORAL 2 TIMES DAILY
Qty: 10 TABLET | Refills: 0 | Status: SHIPPED | OUTPATIENT
Start: 2024-04-12 | End: 2024-04-17

## 2024-04-12 RX ORDER — SODIUM CHLORIDE 9 MG/ML
INJECTION, SOLUTION INTRAVENOUS PRN
Status: DISCONTINUED | OUTPATIENT
Start: 2024-04-12 | End: 2024-04-12 | Stop reason: HOSPADM

## 2024-04-12 RX ORDER — LORAZEPAM 2 MG/ML
0.5 INJECTION INTRAMUSCULAR
Status: DISCONTINUED | OUTPATIENT
Start: 2024-04-12 | End: 2024-04-12 | Stop reason: HOSPADM

## 2024-04-12 RX ORDER — ONDANSETRON 2 MG/ML
4 INJECTION INTRAMUSCULAR; INTRAVENOUS
Status: DISCONTINUED | OUTPATIENT
Start: 2024-04-12 | End: 2024-04-12 | Stop reason: HOSPADM

## 2024-04-12 RX ORDER — IPRATROPIUM BROMIDE AND ALBUTEROL SULFATE 2.5; .5 MG/3ML; MG/3ML
1 SOLUTION RESPIRATORY (INHALATION)
Status: DISCONTINUED | OUTPATIENT
Start: 2024-04-12 | End: 2024-04-12 | Stop reason: HOSPADM

## 2024-04-12 RX ORDER — MEPERIDINE HYDROCHLORIDE 25 MG/ML
12.5 INJECTION INTRAMUSCULAR; INTRAVENOUS; SUBCUTANEOUS EVERY 5 MIN PRN
Status: DISCONTINUED | OUTPATIENT
Start: 2024-04-12 | End: 2024-04-12 | Stop reason: HOSPADM

## 2024-04-12 RX ORDER — ONDANSETRON 2 MG/ML
INJECTION INTRAMUSCULAR; INTRAVENOUS PRN
Status: DISCONTINUED | OUTPATIENT
Start: 2024-04-12 | End: 2024-04-12 | Stop reason: SDUPTHER

## 2024-04-12 RX ORDER — GLUCAGON 1 MG/ML
1 KIT INJECTION PRN
Status: DISCONTINUED | OUTPATIENT
Start: 2024-04-12 | End: 2024-04-12 | Stop reason: HOSPADM

## 2024-04-12 RX ORDER — DEXTROSE MONOHYDRATE 100 MG/ML
INJECTION, SOLUTION INTRAVENOUS CONTINUOUS PRN
Status: DISCONTINUED | OUTPATIENT
Start: 2024-04-12 | End: 2024-04-12 | Stop reason: HOSPADM

## 2024-04-12 RX ORDER — HYDRALAZINE HYDROCHLORIDE 20 MG/ML
10 INJECTION INTRAMUSCULAR; INTRAVENOUS
Status: DISCONTINUED | OUTPATIENT
Start: 2024-04-12 | End: 2024-04-12 | Stop reason: HOSPADM

## 2024-04-12 RX ORDER — SCOLOPAMINE TRANSDERMAL SYSTEM 1 MG/1
1 PATCH, EXTENDED RELEASE TRANSDERMAL ONCE
Status: DISCONTINUED | OUTPATIENT
Start: 2024-04-12 | End: 2024-04-12

## 2024-04-12 RX ORDER — HYDROMORPHONE HYDROCHLORIDE 1 MG/ML
0.5 INJECTION, SOLUTION INTRAMUSCULAR; INTRAVENOUS; SUBCUTANEOUS EVERY 5 MIN PRN
Status: DISCONTINUED | OUTPATIENT
Start: 2024-04-12 | End: 2024-04-12 | Stop reason: HOSPADM

## 2024-04-12 RX ORDER — LIDOCAINE 4 G/G
1 PATCH TOPICAL AS NEEDED
Status: DISCONTINUED | OUTPATIENT
Start: 2024-04-12 | End: 2024-04-12 | Stop reason: HOSPADM

## 2024-04-12 RX ORDER — CIPROFLOXACIN 2 MG/ML
400 INJECTION, SOLUTION INTRAVENOUS ONCE
Status: COMPLETED | OUTPATIENT
Start: 2024-04-12 | End: 2024-04-12

## 2024-04-12 RX ORDER — OXYCODONE HYDROCHLORIDE 5 MG/1
10 TABLET ORAL PRN
Status: DISCONTINUED | OUTPATIENT
Start: 2024-04-12 | End: 2024-04-12 | Stop reason: HOSPADM

## 2024-04-12 RX ORDER — OXYCODONE HYDROCHLORIDE 5 MG/1
5 TABLET ORAL PRN
Status: DISCONTINUED | OUTPATIENT
Start: 2024-04-12 | End: 2024-04-12 | Stop reason: HOSPADM

## 2024-04-12 RX ORDER — LABETALOL HYDROCHLORIDE 5 MG/ML
10 INJECTION, SOLUTION INTRAVENOUS
Status: DISCONTINUED | OUTPATIENT
Start: 2024-04-12 | End: 2024-04-12 | Stop reason: HOSPADM

## 2024-04-12 RX ORDER — DEXAMETHASONE SODIUM PHOSPHATE 4 MG/ML
INJECTION, SOLUTION INTRA-ARTICULAR; INTRALESIONAL; INTRAMUSCULAR; INTRAVENOUS; SOFT TISSUE PRN
Status: DISCONTINUED | OUTPATIENT
Start: 2024-04-12 | End: 2024-04-12 | Stop reason: SDUPTHER

## 2024-04-12 RX ORDER — SODIUM CHLORIDE 0.9 % (FLUSH) 0.9 %
5-40 SYRINGE (ML) INJECTION EVERY 12 HOURS SCHEDULED
Status: DISCONTINUED | OUTPATIENT
Start: 2024-04-12 | End: 2024-04-12 | Stop reason: HOSPADM

## 2024-04-12 RX ORDER — DIPHENHYDRAMINE HYDROCHLORIDE 50 MG/ML
12.5 INJECTION INTRAMUSCULAR; INTRAVENOUS
Status: DISCONTINUED | OUTPATIENT
Start: 2024-04-12 | End: 2024-04-12 | Stop reason: HOSPADM

## 2024-04-12 RX ORDER — NALOXONE HYDROCHLORIDE 0.4 MG/ML
INJECTION, SOLUTION INTRAMUSCULAR; INTRAVENOUS; SUBCUTANEOUS PRN
Status: DISCONTINUED | OUTPATIENT
Start: 2024-04-12 | End: 2024-04-12 | Stop reason: HOSPADM

## 2024-04-12 RX ORDER — SODIUM CHLORIDE, SODIUM LACTATE, POTASSIUM CHLORIDE, CALCIUM CHLORIDE 600; 310; 30; 20 MG/100ML; MG/100ML; MG/100ML; MG/100ML
INJECTION, SOLUTION INTRAVENOUS CONTINUOUS
Status: DISCONTINUED | OUTPATIENT
Start: 2024-04-12 | End: 2024-04-12 | Stop reason: HOSPADM

## 2024-04-12 RX ORDER — OXYCODONE HYDROCHLORIDE AND ACETAMINOPHEN 5; 325 MG/1; MG/1
1 TABLET ORAL EVERY 6 HOURS PRN
Qty: 12 TABLET | Refills: 0 | Status: SHIPPED | OUTPATIENT
Start: 2024-04-12 | End: 2024-04-15

## 2024-04-12 RX ORDER — METOCLOPRAMIDE HYDROCHLORIDE 5 MG/ML
10 INJECTION INTRAMUSCULAR; INTRAVENOUS
Status: DISCONTINUED | OUTPATIENT
Start: 2024-04-12 | End: 2024-04-12 | Stop reason: HOSPADM

## 2024-04-12 RX ORDER — FENTANYL CITRATE 50 UG/ML
INJECTION, SOLUTION INTRAMUSCULAR; INTRAVENOUS PRN
Status: DISCONTINUED | OUTPATIENT
Start: 2024-04-12 | End: 2024-04-12 | Stop reason: SDUPTHER

## 2024-04-12 RX ORDER — SODIUM CHLORIDE 0.9 % (FLUSH) 0.9 %
5-40 SYRINGE (ML) INJECTION PRN
Status: DISCONTINUED | OUTPATIENT
Start: 2024-04-12 | End: 2024-04-12 | Stop reason: HOSPADM

## 2024-04-12 RX ORDER — LIDOCAINE HYDROCHLORIDE 20 MG/ML
INJECTION, SOLUTION EPIDURAL; INFILTRATION; INTRACAUDAL; PERINEURAL PRN
Status: DISCONTINUED | OUTPATIENT
Start: 2024-04-12 | End: 2024-04-12 | Stop reason: SDUPTHER

## 2024-04-12 RX ORDER — FENTANYL CITRATE 50 UG/ML
50 INJECTION, SOLUTION INTRAMUSCULAR; INTRAVENOUS EVERY 5 MIN PRN
Status: DISCONTINUED | OUTPATIENT
Start: 2024-04-12 | End: 2024-04-12 | Stop reason: HOSPADM

## 2024-04-12 RX ADMIN — SODIUM CHLORIDE, POTASSIUM CHLORIDE, SODIUM LACTATE AND CALCIUM CHLORIDE: 600; 310; 30; 20 INJECTION, SOLUTION INTRAVENOUS at 11:47

## 2024-04-12 RX ADMIN — CIPROFLOXACIN 400 MG: 2 INJECTION, SOLUTION INTRAVENOUS at 11:26

## 2024-04-12 RX ADMIN — ONDANSETRON 4 MG: 2 INJECTION INTRAMUSCULAR; INTRAVENOUS at 12:34

## 2024-04-12 RX ADMIN — DEXAMETHASONE SODIUM PHOSPHATE 8 MG: 4 INJECTION, SOLUTION INTRA-ARTICULAR; INTRALESIONAL; INTRAMUSCULAR; INTRAVENOUS; SOFT TISSUE at 11:56

## 2024-04-12 RX ADMIN — FENTANYL CITRATE 25 MCG: 50 INJECTION, SOLUTION INTRAMUSCULAR; INTRAVENOUS at 12:45

## 2024-04-12 RX ADMIN — FENTANYL CITRATE 25 MCG: 50 INJECTION, SOLUTION INTRAMUSCULAR; INTRAVENOUS at 12:24

## 2024-04-12 RX ADMIN — PROPOFOL 130 MG: 10 INJECTION, EMULSION INTRAVENOUS at 11:53

## 2024-04-12 RX ADMIN — LIDOCAINE HYDROCHLORIDE 100 MG: 20 INJECTION, SOLUTION EPIDURAL; INFILTRATION; INTRACAUDAL; PERINEURAL at 11:53

## 2024-04-12 RX ADMIN — FENTANYL CITRATE 50 MCG: 50 INJECTION, SOLUTION INTRAMUSCULAR; INTRAVENOUS at 12:05

## 2024-04-12 RX ADMIN — SODIUM CHLORIDE, POTASSIUM CHLORIDE, SODIUM LACTATE AND CALCIUM CHLORIDE: 600; 310; 30; 20 INJECTION, SOLUTION INTRAVENOUS at 11:04

## 2024-04-12 ASSESSMENT — PAIN - FUNCTIONAL ASSESSMENT
PAIN_FUNCTIONAL_ASSESSMENT: 0-10
PAIN_FUNCTIONAL_ASSESSMENT: 0-10
PAIN_FUNCTIONAL_ASSESSMENT: NONE - DENIES PAIN

## 2024-04-12 NOTE — DISCHARGE INSTRUCTIONS
ESWL  Extracorporeal Shock Wave Lithotripsy    WHAT IS ESWL?  ESWL involves the administration of a series of shock waves generated by a machine called a lithotripter. X-RAY is used to target the stone, and shock waves travel through the tissue, to the stone, and break it into smaller fragments.  For several weeks following treatment, those small fragments are passed out of the body via the urine.     The advantage to ESWL is that it is non-invasive.  That means there are no cuts/incisions or open areas of your skin. You will be asleep during the procedure.  It is considered outpatient; so you will go home the same day.  You will not need to stay in the hospital overnight.    HOW DO I KNOW IF IT WORKED?  Several weeks following your ESWL proceudre, your urologist will perform a follow-up X-RAY to determine whether the stone was broken up into small pieces and if those small pieces were able to pass in the urine. If the stone has broken up into small fragments but the fragments have not cleared, you may need another X-RAY in several weeks to reassess.  If the stone has not broken up into small fragments, your urologist will likely recommend further treatment.    WHAT HAPPENS WHEN I GO HOME?  Pain control: You can expect to have some discomfort that necessitates pain medication for a few days after discharge. Tylenol should be enough to control your pain.  However, in some cases, you may need additional pain medication, which will be up to your urologist.  If you require narcotic pain medication, take it as directed.  Do not exceed the recommended dosage.  It is meant for short-term usage.  Do not drive while you are taking narcotics.  Avoid ibuprofen or naproxen as they can cause bleeding.  Bleeding: It is normal to see blood in the urine for several weeks after surgery.  Do not be alarmed if your urine is pink tinged, or you are passing blood or gravel (sand like substance).  Hydration: It is important after your

## 2024-04-12 NOTE — PERIOP NOTE
Patient alert and oriented x4, VS stable, no complaints of pain at this time. Family at bedside. Bed in low position, call bell within reach.    Patient states okay to review and give discharge instructions to david Coatssin.

## 2024-04-12 NOTE — OP NOTE
Operative Note      Patient: Stacie Atwood  YOB: 1950  MRN: [id number]    Date of Procedure: 4/12/2024    Pre-Op Diagnosis Codes:     * Left ureteral stone [N20.1]    Post-Op Diagnosis: Same       Procedure(s):  LEFT URETERAL  EXTRACORPOREAL SHOCK WAVE LITHOTRIPSY POSSIBLE CYSTOURETHROSCOPY WITH RETROGRADE PYELOGRAM  .    Surgeon(s):  Lopez Reyes MD    Assistant:   * No surgical staff found *    Anesthesia: General    Estimated Blood Loss (mL): Minimal    Complications: None    Specimens:   * No specimens in log *    Implants:  * No implants in log *      Drains: * No LDAs found *    Findings:  Infection Present At Time Of Surgery (PATOS) (choose all levels that have infection present):  No infection present  Other Findings: left mid ureteral stone    Detailed Description of Procedure:   Pt for eswl. Aware of risks of bleeding infection , injury to organs , pe , mi death. She has no questions.  Name:     Stacie Atwood    MR #: [id number]      Surgery Date: 4/12/2024                                              OPERATIVE REPORT      PREOPERATIVE DIAGNOSIS:  nephrolithiasis     POSTOPERATIVE DIAGNOSIS:  same    OPERATIVE PROCEDURE:   left Extracorporeal shockwave lithotripsy      SURGEON:   LOPEZ REYES MD    ANESTHESIA:   General    EBL: none    SPECIMENS: none  DRAINS: none  COMPLICATIONS: none    INDICATIONS:  A  73 y.o. with a history of nephrolithiasis.     PROCEDURE IN DETAIL:  The patient underwent a general anesthetic. A time out was called and the planned operation verified.      The patient was placed supine on the procedure table.  Using multiplanar flouroscopy the stone was visualized and targeted.  Using the Dornier Compact Delta 2 lithotriptor, shockwaves were delivered from a power level of 1 to a level of 5 for a total of 2500 shocks.    There was apparent fragmentation seen.  The need for further intervention cannot be excluded.     The patient tolerated the procedure  well and was transported in stable to the recovery.        MARINE CALLOWAY MD      Electronically signed by MARINE CALLOWAY MD on 4/12/2024 at 12:08 PM

## 2024-04-12 NOTE — PERIOP NOTE
Discharge instructions printed and provided to patient with all questions answered in full. PIV x1 removed with cath tip intact. Pt VSS and no signs of distress noted. Pt ambulating to restroom and voiding  with no issues. Pt tolerating liquids and solids with no issues. Nausea has subsided at this time. Pt wheeled down to main entrance accompanied by staff and safely transferred to vehicle. Pt condition stable at time of discharge.

## 2024-04-12 NOTE — ANESTHESIA PRE PROCEDURE
Department of Anesthesiology  Preprocedure Note       Name:  Stacie Atwood   Age:  73 y.o.  :  1950                                          MRN:  [id number]         Date:  2024      Surgeon: Surgeon(s):  Lopez Reyes MD    Procedure: Procedure(s):  LEFT URETERAL  EXTRACORPOREAL SHOCK WAVE LITHOTRIPSY POSSIBLE CYSTOURETHROSCOPY WITH RETROGRADE PYELOGRAM  .    Medications prior to admission:   Prior to Admission medications    Medication Sig Start Date End Date Taking? Authorizing Provider   potassium chloride (KLOR-CON M10) 10 MEQ extended release tablet Take 1 tablet by mouth daily    Latisha Mendez MD   metoprolol succinate (TOPROL XL) 50 MG extended release tablet Take 1 tablet by mouth daily 10/17/23   Latisha Mendez MD   folic acid (FOLVITE) 1 MG tablet Take 1 tablet by mouth daily 19   Latisha Mendez MD   ergocalciferol (ERGOCALCIFEROL) 1.25 MG (48476 UT) capsule Take 1 capsule by mouth once a week Tuesdays 3/16/23   Latisha Mendez MD   bumetanide (BUMEX) 1 MG tablet Take 1 tablet by mouth daily 19   Latisha Mendez MD   acetaminophen (TYLENOL) 500 MG CAPS capsule Take 1 capsule by mouth every 6 hours as needed for Pain    Automatic Reconciliation, Ar   rivaroxaban (XARELTO STARTER PACK) 15 & 20 MG Starter Pack Take 1 each by mouth See Admin Instructions 22   Automatic Reconciliation, Ar       Current medications:    Current Facility-Administered Medications   Medication Dose Route Frequency Provider Last Rate Last Admin    lactated ringers IV soln infusion   IntraVENous Continuous Lopez Reyes MD 20 mL/hr at 24 1104 New Bag at 24 1104    ciprofloxacin (CIPRO) IVPB 400 mg  400 mg IntraVENous Once Lopez Reyes MD           Allergies:    Allergies   Allergen Reactions    Latex Hives    Atorvastatin Other (See Comments)     Cramping of muscles    Rosuvastatin Other (See Comments)     Cramping of muscles    Codeine Other  kg (215 lb 6.4 oz)   03/21/24 90.7 kg (200 lb)     Body mass index is 33.28 kg/m².    CBC:   Lab Results   Component Value Date/Time    WBC 6.7 08/02/2022 05:53 AM    RBC 3.78 08/02/2022 05:53 AM    HGB 11.0 08/02/2022 05:53 AM    HCT 33.7 08/02/2022 05:53 AM    MCV 89.2 08/02/2022 05:53 AM    RDW 14.7 08/02/2022 05:53 AM     08/02/2022 05:53 AM       CMP:   Lab Results   Component Value Date/Time     08/02/2022 05:53 AM    K 4.0 08/02/2022 05:53 AM     08/02/2022 05:53 AM    CO2 27 08/02/2022 05:53 AM    BUN 17 08/02/2022 05:53 AM    CREATININE 0.43 04/12/2024 11:06 AM    CREATININE 0.84 08/02/2022 05:53 AM    GFRAA >60 08/02/2022 05:53 AM    AGRATIO 0.9 08/02/2022 05:53 AM    GLUCOSE 92 08/02/2022 05:53 AM    PROT 6.3 08/02/2022 05:53 AM    CALCIUM 8.6 08/02/2022 05:53 AM    BILITOT 0.9 08/02/2022 05:53 AM    ALKPHOS 101 08/02/2022 05:53 AM    AST 45 08/02/2022 05:53 AM    ALT 81 08/02/2022 05:53 AM       POC Tests:   Recent Labs     04/12/24  1106   POCGLU 93   POCNA 144   POCK 3.6   POCCL 102       Coags:   Lab Results   Component Value Date/Time    PROTIME 14.6 04/03/2024 02:30 PM    INR 1.1 04/03/2024 02:30 PM    APTT 29.5 04/03/2024 02:30 PM    APTT 75.8 07/31/2022 01:20 PM       HCG (If Applicable): No results found for: \"PREGTESTUR\", \"PREGSERUM\", \"HCG\", \"HCGQUANT\"     ABGs: No results found for: \"PHART\", \"PO2ART\", \"YOA6AFR\", \"BBE5WRV\", \"BEART\", \"F1JEFYCM\"     Type & Screen (If Applicable):  No results found for: \"LABABO\", \"LABRH\"    Drug/Infectious Status (If Applicable):  No results found for: \"HIV\", \"HEPCAB\"    COVID-19 Screening (If Applicable): No results found for: \"COVID19\"        Anesthesia Evaluation  Patient summary reviewed and Nursing notes reviewed   no history of anesthetic complications:   Airway: Mallampati: I  TM distance: >3 FB   Neck ROM: full  Mouth opening: > = 3 FB   Dental: normal exam         Pulmonary:Negative Pulmonary ROS and normal exam  breath sounds clear to

## 2024-04-18 DIAGNOSIS — N20.0 KIDNEY STONES: Primary | ICD-10-CM

## 2024-04-19 ENCOUNTER — HOSPITAL ENCOUNTER (OUTPATIENT)
Facility: HOSPITAL | Age: 74
End: 2024-04-19
Payer: MEDICARE

## 2024-04-19 ENCOUNTER — TELEPHONE (OUTPATIENT)
Age: 74
End: 2024-04-19

## 2024-04-19 DIAGNOSIS — N20.1 LEFT URETERAL STONE: ICD-10-CM

## 2024-04-19 PROCEDURE — 74018 RADEX ABDOMEN 1 VIEW: CPT

## 2024-04-22 ENCOUNTER — OFFICE VISIT (OUTPATIENT)
Age: 74
End: 2024-04-22
Payer: MEDICARE

## 2024-04-22 VITALS — SYSTOLIC BLOOD PRESSURE: 134 MMHG | HEART RATE: 71 BPM | DIASTOLIC BLOOD PRESSURE: 68 MMHG

## 2024-04-22 DIAGNOSIS — R82.991 HYPOCITRATURIA: ICD-10-CM

## 2024-04-22 DIAGNOSIS — N20.1 LEFT URETERAL STONE: Primary | ICD-10-CM

## 2024-04-22 PROCEDURE — 1090F PRES/ABSN URINE INCON ASSESS: CPT | Performed by: UROLOGY

## 2024-04-22 PROCEDURE — 3017F COLORECTAL CA SCREEN DOC REV: CPT | Performed by: UROLOGY

## 2024-04-22 PROCEDURE — G8399 PT W/DXA RESULTS DOCUMENT: HCPCS | Performed by: UROLOGY

## 2024-04-22 PROCEDURE — 1036F TOBACCO NON-USER: CPT | Performed by: UROLOGY

## 2024-04-22 PROCEDURE — G8419 CALC BMI OUT NRM PARAM NOF/U: HCPCS | Performed by: UROLOGY

## 2024-04-22 PROCEDURE — 1123F ACP DISCUSS/DSCN MKR DOCD: CPT | Performed by: UROLOGY

## 2024-04-22 PROCEDURE — 99214 OFFICE O/P EST MOD 30 MIN: CPT | Performed by: UROLOGY

## 2024-04-22 PROCEDURE — G8427 DOCREV CUR MEDS BY ELIG CLIN: HCPCS | Performed by: UROLOGY

## 2024-04-22 RX ORDER — POTASSIUM CITRATE 15 MEQ/1
15 TABLET, EXTENDED RELEASE ORAL 2 TIMES DAILY WITH MEALS
Qty: 90 TABLET | Refills: 5
Start: 2024-04-22

## 2024-04-22 NOTE — PROGRESS NOTES
Chief Complaint   Patient presents with    Follow-up        /68 (Site: Left Upper Arm, Position: Sitting, Cuff Size: Medium Adult)   Pulse 71      PHQ-9 score is    Negative      1. \"Have you been to the ER, urgent care clinic since your last visit?  Hospitalized since your last visit?\" No    2. \"Have you seen or consulted any other health care providers outside of the [facility] since your last visit?\" No     3. For patients aged 45-75: Has the patient had a colonoscopy / FIT/ Cologuard? Yes - no Care Gap present      If the patient is female:    4. For patients aged 40-74: Has the patient had a mammogram within the past 2 years? Yes - no Care Gap present      5. For patients aged 21-65: Has the patient had a pap smear? Yes - no Care Gap present

## 2024-04-22 NOTE — PROGRESS NOTES
HISTORY OF PRESENT ILLNESS  Stacie Atwood is a 73 y.o. female   Patient has recurrent history of stones.  Litholink shows she has a slight low level of citrate in her urine otherwise unremarkable.  I suggested she could take some potassium citrate once a day but 15 mill equivalents probably during that level high enough to prevent stones long she pushes fluids.  Also will obtain a KUB in 6 months prior to seeing her again  1. Left ureteral stone  Overview:  Ct on 2/17/2024 reveled:  7 mm stone left proximal ureter resulting in moderate left hydronephrosis and  slightly delayed nephrogram. Small nonobstructing right renal stone     She is s/p left  ESWL on 4/12/2024   KUB 4/19/2024 -Apparent interval passing of left ureteral stone.   Orders:  -     AMB POC URINALYSIS DIP STICK AUTO W/O MICRO  -     XR ABDOMEN (KUB) (SINGLE AP VIEW); Future  -     Potassium Citrate ER (UROCIT-K) 15 MEQ (1620 MG) TBCR extended release tablet; Take 1 tablet by mouth 2 times daily (with meals), Disp-90 tablet, R-5NO PRINT  2. Hypocitraturia  -     Potassium Citrate ER (UROCIT-K) 15 MEQ (1620 MG) TBCR extended release tablet; Take 1 tablet by mouth 2 times daily (with meals), Disp-90 tablet, R-5NO PRINT        PAST MEDICAL HISTORY  PMHx (including negatives):  has a past medical history of Arthritis, DVT (deep venous thrombosis) (HCC), Hx of blood clots, Hyperlipidemia, Hypertension, and Pulmonary embolus (HCC).   PSurgHx:  has a past surgical history that includes Hysterectomy; IR GUIDED THROMB MECH NONCOR ART SBSQ VESSEL (07/28/2022); joint replacement (Bilateral); Cholecystectomy; Bilateral oophorectomy; Colonoscopy; eye surgery (Bilateral); Multiple tooth extractions; and Lithotripsy (Left, 4/12/2024).  PSocHx:  reports that she has never smoked. She has never used smokeless tobacco. She reports that she does not drink alcohol and does not use drugs.   FamilyHX:   Family History   Problem Relation Age of Onset    Stroke Mother

## 2024-05-29 NOTE — ANESTHESIA POSTPROCEDURE EVALUATION
Department of Anesthesiology  Postprocedure Note    Patient: Stacie Atwood  MRN: [id number]  YOB: 1950    Procedure Summary       Date: 04/12/24 Room / Location: [facility] MAIN CYSTO / [facility] MAIN OR    Anesthesia Start: 1147 Anesthesia Stop: 1252    Procedure: LEFT URETERAL  EXTRACORPOREAL SHOCK WAVE LITHOTRIPSY (Left) Diagnosis:       Left ureteral stone      (Left ureteral stone [N20.1])    Surgeons: Lopez Reyes MD Responsible Provider: River Benitez MD    Anesthesia Type: General ASA Status: 2            Anesthesia Type: General    Nya Phase I: Nya Score: 8    Nya Phase II: Nya Score: 10    Anesthesia Post Evaluation    Patient location during evaluation: PACU  Patient participation: complete - patient cannot participate  Level of consciousness: awake  Pain score: 0  Airway patency: patent  Nausea & Vomiting: no nausea and no vomiting  Cardiovascular status: hemodynamically stable  Respiratory status: acceptable  Hydration status: stable  Multimodal analgesia pain management approach        No notable events documented.

## 2024-08-02 ENCOUNTER — HOSPITAL ENCOUNTER (OUTPATIENT)
Facility: HOSPITAL | Age: 74
End: 2024-08-02
Payer: MEDICARE

## 2024-08-02 DIAGNOSIS — E04.2 MULTINODULAR NON-TOXIC GOITER: ICD-10-CM

## 2024-08-02 PROCEDURE — 76536 US EXAM OF HEAD AND NECK: CPT

## 2024-10-16 ENCOUNTER — HOSPITAL ENCOUNTER (OUTPATIENT)
Facility: HOSPITAL | Age: 74
Discharge: HOME | End: 2024-10-19
Attending: INTERNAL MEDICINE
Payer: MEDICARE

## 2024-10-16 ENCOUNTER — HOSPITAL ENCOUNTER (OUTPATIENT)
Facility: HOSPITAL | Age: 74
Discharge: HOME | End: 2024-10-18
Attending: INTERNAL MEDICINE
Payer: MEDICARE

## 2024-10-16 DIAGNOSIS — I26.99 PULMONARY INFARCTION (HCC): ICD-10-CM

## 2024-10-16 DIAGNOSIS — R06.02 SHORTNESS OF BREATH: ICD-10-CM

## 2024-10-16 DIAGNOSIS — R22.43 LOCALIZED SWELLING, MASS AND LUMP, LOWER LIMB, BILATERAL: ICD-10-CM

## 2024-10-16 PROCEDURE — 93970 EXTREMITY STUDY: CPT

## 2024-10-18 ENCOUNTER — TELEPHONE (OUTPATIENT)
Age: 74
End: 2024-10-18

## 2024-10-18 ENCOUNTER — HOSPITAL ENCOUNTER (OUTPATIENT)
Facility: HOSPITAL | Age: 74
Discharge: HOME | End: 2024-10-21
Payer: MEDICARE

## 2024-10-18 DIAGNOSIS — N20.0 KIDNEY STONES: ICD-10-CM

## 2024-10-18 DIAGNOSIS — N20.0 KIDNEY STONES: Primary | ICD-10-CM

## 2024-10-18 PROCEDURE — 74018 RADEX ABDOMEN 1 VIEW: CPT

## 2024-10-21 ENCOUNTER — OFFICE VISIT (OUTPATIENT)
Age: 74
End: 2024-10-21
Payer: MEDICARE

## 2024-10-21 VITALS — SYSTOLIC BLOOD PRESSURE: 121 MMHG | DIASTOLIC BLOOD PRESSURE: 72 MMHG | HEART RATE: 61 BPM

## 2024-10-21 DIAGNOSIS — R82.991 HYPOCITRATURIA: ICD-10-CM

## 2024-10-21 DIAGNOSIS — N20.1 LEFT URETERAL STONE: ICD-10-CM

## 2024-10-21 DIAGNOSIS — R31.0 GROSS HEMATURIA: Primary | ICD-10-CM

## 2024-10-21 DIAGNOSIS — N20.0 BILATERAL KIDNEY STONES: ICD-10-CM

## 2024-10-21 PROCEDURE — 1123F ACP DISCUSS/DSCN MKR DOCD: CPT | Performed by: UROLOGY

## 2024-10-21 PROCEDURE — G8417 CALC BMI ABV UP PARAM F/U: HCPCS | Performed by: UROLOGY

## 2024-10-21 PROCEDURE — 1036F TOBACCO NON-USER: CPT | Performed by: UROLOGY

## 2024-10-21 PROCEDURE — G8427 DOCREV CUR MEDS BY ELIG CLIN: HCPCS | Performed by: UROLOGY

## 2024-10-21 PROCEDURE — 3017F COLORECTAL CA SCREEN DOC REV: CPT | Performed by: UROLOGY

## 2024-10-21 PROCEDURE — 99214 OFFICE O/P EST MOD 30 MIN: CPT | Performed by: UROLOGY

## 2024-10-21 PROCEDURE — 1090F PRES/ABSN URINE INCON ASSESS: CPT | Performed by: UROLOGY

## 2024-10-21 PROCEDURE — G8399 PT W/DXA RESULTS DOCUMENT: HCPCS | Performed by: UROLOGY

## 2024-10-21 PROCEDURE — G8484 FLU IMMUNIZE NO ADMIN: HCPCS | Performed by: UROLOGY

## 2024-10-21 RX ORDER — POTASSIUM CITRATE 15 MEQ/1
15 TABLET, EXTENDED RELEASE ORAL 2 TIMES DAILY WITH MEALS
Qty: 90 TABLET | Refills: 5 | Status: SHIPPED | OUTPATIENT
Start: 2024-10-21

## 2024-10-21 ASSESSMENT — PATIENT HEALTH QUESTIONNAIRE - PHQ9
SUM OF ALL RESPONSES TO PHQ QUESTIONS 1-9: 0
SUM OF ALL RESPONSES TO PHQ9 QUESTIONS 1 & 2: 0
SUM OF ALL RESPONSES TO PHQ QUESTIONS 1-9: 0
SUM OF ALL RESPONSES TO PHQ QUESTIONS 1-9: 0
2. FEELING DOWN, DEPRESSED OR HOPELESS: NOT AT ALL
SUM OF ALL RESPONSES TO PHQ QUESTIONS 1-9: 0
1. LITTLE INTEREST OR PLEASURE IN DOING THINGS: NOT AT ALL

## 2024-10-21 ASSESSMENT — ENCOUNTER SYMPTOMS
EYE ITCHING: 1
BACK PAIN: 1

## 2024-10-21 NOTE — PROGRESS NOTES
Chief Complaint   Patient presents with    Follow-up     Completed KUB Friday 10/18/24        /72   Pulse 61      PHQ-9 score is    Negative      1. \"Have you been to the ER, urgent care clinic since your last visit?  Hospitalized since your last visit?\" No    2. \"Have you seen or consulted any other health care providers outside of the [facility] since your last visit?\" No     3. For patients aged 45-75: Has the patient had a colonoscopy / FIT/ Cologuard? NA - based on age      If the patient is female:    4. For patients aged 40-74: Has the patient had a mammogram within the past 2 years? NA - based on age or sex      5. For patients aged 21-65: Has the patient had a pap smear? NA - based on age or sex  
  Potassium Citrate ER (UROCIT-K) 15 MEQ (1620 MG) TBCR extended release tablet; Take 1 tablet by mouth 2 times daily (with meals), Disp-90 tablet, R-5Normal    See me back in 6 months KUB prior    Lopez Reyes MD      Please note that portions of this note was potentially completed with Dragon dictation, the computer voice recognition software.  Quite often unanticipated grammatical, syntax, homophones, and other interpretive errors are inadvertently transcribed by the computer software.  Please disregard these errors.  Please excuse any errors that have escaped final proofreading.  Thank you.

## 2024-12-04 ENCOUNTER — TRANSCRIBE ORDERS (OUTPATIENT)
Facility: HOSPITAL | Age: 74
End: 2024-12-04

## 2024-12-04 DIAGNOSIS — Z12.31 VISIT FOR SCREENING MAMMOGRAM: Primary | ICD-10-CM

## 2024-12-13 ENCOUNTER — HOSPITAL ENCOUNTER (OUTPATIENT)
Facility: HOSPITAL | Age: 74
Discharge: HOME | End: 2024-12-16
Payer: MEDICARE

## 2024-12-13 DIAGNOSIS — Z12.31 VISIT FOR SCREENING MAMMOGRAM: ICD-10-CM

## 2024-12-13 PROCEDURE — 77063 BREAST TOMOSYNTHESIS BI: CPT

## 2025-01-14 ENCOUNTER — HOSPITAL ENCOUNTER (OUTPATIENT)
Facility: HOSPITAL | Age: 75
Discharge: HOME | End: 2025-01-17
Attending: INTERNAL MEDICINE
Payer: MEDICARE

## 2025-01-14 DIAGNOSIS — I26.99 PULMONARY INFARCTION (HCC): ICD-10-CM

## 2025-01-14 DIAGNOSIS — R10.9 STOMACH ACHE: ICD-10-CM

## 2025-01-14 DIAGNOSIS — R06.02 SHORTNESS OF BREATH: ICD-10-CM

## 2025-01-14 LAB — CREAT BLD-MCNC: 0.8 MG/DL (ref 0.6–1.3)

## 2025-01-14 PROCEDURE — 74177 CT ABD & PELVIS W/CONTRAST: CPT

## 2025-01-14 PROCEDURE — 6360000004 HC RX CONTRAST MEDICATION: Performed by: INTERNAL MEDICINE

## 2025-01-14 PROCEDURE — 71275 CT ANGIOGRAPHY CHEST: CPT

## 2025-01-14 PROCEDURE — 82565 ASSAY OF CREATININE: CPT

## 2025-01-14 RX ORDER — IOPAMIDOL 755 MG/ML
100 INJECTION, SOLUTION INTRAVASCULAR
Status: COMPLETED | OUTPATIENT
Start: 2025-01-14 | End: 2025-01-14

## 2025-01-14 RX ORDER — DIATRIZOATE MEGLUMINE AND DIATRIZOATE SODIUM 660; 100 MG/ML; MG/ML
30 SOLUTION ORAL; RECTAL
Status: DISCONTINUED | OUTPATIENT
Start: 2025-01-14 | End: 2025-01-18 | Stop reason: HOSPADM

## 2025-01-14 RX ADMIN — IOPAMIDOL 100 ML: 755 INJECTION, SOLUTION INTRAVENOUS at 12:51

## 2025-01-14 RX ADMIN — DIATRIZOATE MEGLUMINE AND DIATRIZOATE SODIUM 30 ML: 660; 100 LIQUID ORAL; RECTAL at 12:51

## 2025-02-04 PROBLEM — N20.0 KIDNEY STONES: Status: ACTIVE | Noted: 2025-02-04

## 2025-02-06 ENCOUNTER — OFFICE VISIT (OUTPATIENT)
Age: 75
End: 2025-02-06
Payer: MEDICARE

## 2025-02-06 VITALS
WEIGHT: 200 LBS | HEIGHT: 65 IN | BODY MASS INDEX: 33.32 KG/M2 | HEART RATE: 77 BPM | SYSTOLIC BLOOD PRESSURE: 164 MMHG | DIASTOLIC BLOOD PRESSURE: 79 MMHG

## 2025-02-06 DIAGNOSIS — Z79.01 ANTICOAGULATED BY ANTICOAGULATION TREATMENT: ICD-10-CM

## 2025-02-06 DIAGNOSIS — N20.0 KIDNEY STONES: ICD-10-CM

## 2025-02-06 DIAGNOSIS — R31.0 GROSS HEMATURIA: ICD-10-CM

## 2025-02-06 DIAGNOSIS — N20.0 RIGHT RENAL STONE: ICD-10-CM

## 2025-02-06 DIAGNOSIS — N20.0 LEFT RENAL STONE: Primary | ICD-10-CM

## 2025-02-06 DIAGNOSIS — I71.40 ABDOMINAL AORTIC ANEURYSM (AAA) GREATER THAN 39 MM IN DIAMETER (HCC): ICD-10-CM

## 2025-02-06 DIAGNOSIS — I26.99 BILATERAL PULMONARY EMBOLISM (HCC): ICD-10-CM

## 2025-02-06 LAB
BILIRUBIN, URINE, POC: NEGATIVE
BLOOD URINE, POC: ABNORMAL
GLUCOSE URINE, POC: NEGATIVE
KETONES, URINE, POC: NEGATIVE
LEUKOCYTE ESTERASE, URINE, POC: NEGATIVE
NITRITE, URINE, POC: NEGATIVE
PH, URINE, POC: 5.5 (ref 4.6–8)
PROTEIN,URINE, POC: 300
SPECIFIC GRAVITY, URINE, POC: 1.03 (ref 1–1.03)
URINALYSIS CLARITY, POC: CLEAR
URINALYSIS COLOR, POC: ABNORMAL
UROBILINOGEN, POC: ABNORMAL

## 2025-02-06 PROCEDURE — 1123F ACP DISCUSS/DSCN MKR DOCD: CPT | Performed by: UROLOGY

## 2025-02-06 PROCEDURE — G8417 CALC BMI ABV UP PARAM F/U: HCPCS | Performed by: UROLOGY

## 2025-02-06 PROCEDURE — 81003 URINALYSIS AUTO W/O SCOPE: CPT | Performed by: UROLOGY

## 2025-02-06 PROCEDURE — 1090F PRES/ABSN URINE INCON ASSESS: CPT | Performed by: UROLOGY

## 2025-02-06 PROCEDURE — 3017F COLORECTAL CA SCREEN DOC REV: CPT | Performed by: UROLOGY

## 2025-02-06 PROCEDURE — 1159F MED LIST DOCD IN RCRD: CPT | Performed by: UROLOGY

## 2025-02-06 PROCEDURE — G8399 PT W/DXA RESULTS DOCUMENT: HCPCS | Performed by: UROLOGY

## 2025-02-06 PROCEDURE — 1036F TOBACCO NON-USER: CPT | Performed by: UROLOGY

## 2025-02-06 PROCEDURE — 1126F AMNT PAIN NOTED NONE PRSNT: CPT | Performed by: UROLOGY

## 2025-02-06 PROCEDURE — G8427 DOCREV CUR MEDS BY ELIG CLIN: HCPCS | Performed by: UROLOGY

## 2025-02-06 PROCEDURE — 99215 OFFICE O/P EST HI 40 MIN: CPT | Performed by: UROLOGY

## 2025-02-06 RX ORDER — RIVAROXABAN 10 MG/1
10 TABLET, FILM COATED ORAL
COMMUNITY

## 2025-02-06 ASSESSMENT — ENCOUNTER SYMPTOMS: ABDOMINAL DISTENTION: 1

## 2025-02-06 NOTE — PROGRESS NOTES
HISTORY OF PRESENT ILLNESS  Stacie Atwood is a 74 y.o. female     1. Left renal stone  Overview:   Ct scan on 1/16/2025  reveled 7 mm stone lower pole left collecting system, 6 mm nonobstructing right  renal stone. No hydronephrosis.  2. Kidney stones  Overview:   Ct scan on 1/16/2025  reveled 7 mm stone lower pole left collecting system, 6 mm nonobstructing right  renal stone. No hydronephrosis.  Orders:  -     AMB POC URINALYSIS DIP STICK AUTO W/O MICRO  3. Gross hematuria  Overview:  On Xarelto secondary to DVT  Orders:  -     AMB POC URINALYSIS DIP STICK AUTO W/O MICRO  4. Right renal stone  Overview:   Ct scan on 1/16/2025  reveled 7 mm stone lower pole left collecting system, 6 mm nonobstructing right  renal stone. No hydronephrosis.  5. Bilateral pulmonary embolism (HCC)  6. Anticoagulated by anticoagulation treatment  7. Abdominal aortic aneurysm (AAA) greater than 39 mm in diameter (HCC)        PAST MEDICAL HISTORY  PMHx (including negatives):  has a past medical history of Arthritis, DVT (deep venous thrombosis) (HCC), Hx of blood clots, Hyperlipidemia, Hypertension, and Pulmonary embolus (HCC).   PSurgHx:  has a past surgical history that includes Hysterectomy; IR GUIDED THROMB MECH NONCOR ART SBSQ VESSEL (07/28/2022); joint replacement (Bilateral); Cholecystectomy; Bilateral oophorectomy; Colonoscopy; eye surgery (Bilateral); Multiple tooth extractions; and Lithotripsy (Left, 4/12/2024).  PSocHx:  reports that she has never smoked. She has never used smokeless tobacco. She reports that she does not drink alcohol and does not use drugs.   FamilyHX:   Family History   Problem Relation Age of Onset    Stroke Mother     Prostate Cancer Father     Stroke Father     Thyroid Cancer Sister     Heart Failure Brother    CT scan shows a stone on the right kidney 1 from 5.7 mm to 6 mm and the stone in the left lower collecting system is 7 mm places additional small stone even more distal next to the calyx base

## 2025-02-06 NOTE — PROGRESS NOTES
Chief Complaint   Patient presents with    Follow-up    Nephrolithiasis     1. Have you been to the ER, urgent care clinic since your last visit?  Hospitalized since your last visit?No    2. Have you seen or consulted any other health care providers outside of the [facility] since your last visit?  Include any pap smears or colon screening. No  BP (!) 164/79   Pulse 77   Ht 1.651 m (5' 5\")   Wt 90.7 kg (200 lb)   BMI 33.28 kg/m²

## 2025-02-07 ENCOUNTER — PREP FOR PROCEDURE (OUTPATIENT)
Age: 75
End: 2025-02-07

## 2025-02-07 DIAGNOSIS — N20.0 LEFT RENAL STONE: ICD-10-CM

## 2025-02-07 DIAGNOSIS — N20.0 LEFT RENAL STONE: Primary | ICD-10-CM

## 2025-02-07 RX ORDER — SODIUM CHLORIDE 0.9 % (FLUSH) 0.9 %
5-40 SYRINGE (ML) INJECTION EVERY 12 HOURS SCHEDULED
OUTPATIENT
Start: 2025-02-07

## 2025-02-07 RX ORDER — SODIUM CHLORIDE 0.9 % (FLUSH) 0.9 %
5-40 SYRINGE (ML) INJECTION PRN
OUTPATIENT
Start: 2025-02-07

## 2025-02-07 RX ORDER — SODIUM CHLORIDE 9 MG/ML
INJECTION, SOLUTION INTRAVENOUS PRN
OUTPATIENT
Start: 2025-02-07

## 2025-02-27 ENCOUNTER — HOSPITAL ENCOUNTER (OUTPATIENT)
Facility: HOSPITAL | Age: 75
Discharge: HOME | End: 2025-02-27
Payer: MEDICARE

## 2025-02-27 VITALS
DIASTOLIC BLOOD PRESSURE: 79 MMHG | OXYGEN SATURATION: 97 % | TEMPERATURE: 97.9 F | HEART RATE: 83 BPM | SYSTOLIC BLOOD PRESSURE: 130 MMHG | RESPIRATION RATE: 20 BRPM

## 2025-02-27 LAB
ANION GAP SERPL CALC-SCNC: 4 MMOL/L (ref 2–12)
APPEARANCE UR: CLEAR
BACTERIA URNS QL MICRO: NEGATIVE /HPF
BILIRUB UR QL: NEGATIVE
BUN SERPL-MCNC: 17 MG/DL (ref 6–20)
BUN/CREAT SERPL: 24 (ref 12–20)
CA-I BLD-MCNC: 8.8 MG/DL (ref 8.5–10.1)
CHLORIDE SERPL-SCNC: 106 MMOL/L (ref 97–108)
CO2 SERPL-SCNC: 31 MMOL/L (ref 21–32)
COLOR UR: ABNORMAL
CREAT SERPL-MCNC: 0.72 MG/DL (ref 0.55–1.02)
EPITH CASTS URNS QL MICRO: ABNORMAL /LPF
ERYTHROCYTE [DISTWIDTH] IN BLOOD BY AUTOMATED COUNT: 14 % (ref 11.5–14.5)
GLUCOSE SERPL-MCNC: 114 MG/DL (ref 65–100)
GLUCOSE UR STRIP.AUTO-MCNC: NEGATIVE MG/DL
HCT VFR BLD AUTO: 37.6 % (ref 35–47)
HGB BLD-MCNC: 12.2 G/DL (ref 11.5–16)
HGB UR QL STRIP: ABNORMAL
HYALINE CASTS URNS QL MICRO: ABNORMAL /LPF (ref 0–5)
KETONES UR QL STRIP.AUTO: NEGATIVE MG/DL
LEUKOCYTE ESTERASE UR QL STRIP.AUTO: ABNORMAL
MCH RBC QN AUTO: 28.9 PG (ref 26–34)
MCHC RBC AUTO-ENTMCNC: 32.4 G/DL (ref 30–36.5)
MCV RBC AUTO: 89.1 FL (ref 80–99)
MUCOUS THREADS URNS QL MICRO: ABNORMAL /LPF
NITRITE UR QL STRIP.AUTO: NEGATIVE
NRBC # BLD: 0 K/UL (ref 0–0.01)
NRBC BLD-RTO: 0 PER 100 WBC
PH UR STRIP: 6 (ref 5–8)
PLATELET # BLD AUTO: 174 K/UL (ref 150–400)
PMV BLD AUTO: 12.8 FL (ref 8.9–12.9)
POTASSIUM SERPL-SCNC: 3 MMOL/L (ref 3.5–5.1)
PROT UR STRIP-MCNC: NEGATIVE MG/DL
RBC # BLD AUTO: 4.22 M/UL (ref 3.8–5.2)
RBC #/AREA URNS HPF: ABNORMAL /HPF (ref 0–5)
SODIUM SERPL-SCNC: 141 MMOL/L (ref 136–145)
SP GR UR REFRACTOMETRY: 1.01 (ref 1–1.03)
UROBILINOGEN UR QL STRIP.AUTO: 0.1 EU/DL (ref 0.1–1)
WBC # BLD AUTO: 5.7 K/UL (ref 3.6–11)
WBC URNS QL MICRO: ABNORMAL /HPF (ref 0–4)

## 2025-02-27 PROCEDURE — 81001 URINALYSIS AUTO W/SCOPE: CPT

## 2025-02-27 PROCEDURE — 87086 URINE CULTURE/COLONY COUNT: CPT

## 2025-02-27 PROCEDURE — 80048 BASIC METABOLIC PNL TOTAL CA: CPT

## 2025-02-27 PROCEDURE — 85027 COMPLETE CBC AUTOMATED: CPT

## 2025-02-27 PROCEDURE — 36415 COLL VENOUS BLD VENIPUNCTURE: CPT

## 2025-02-27 RX ORDER — ACETAMINOPHEN 160 MG
2000 TABLET,DISINTEGRATING ORAL DAILY
COMMUNITY

## 2025-02-27 RX ORDER — BIOTIN 1000 MCG
1 TABLET,CHEWABLE ORAL DAILY
COMMUNITY

## 2025-02-27 RX ORDER — ASCORBIC ACID 125 MG
1 TABLET,CHEWABLE ORAL DAILY
COMMUNITY

## 2025-02-27 NOTE — PERIOP NOTE
Dr. Reyes's office stated to hold Eliquis for 2 days, will contact Dr. Zamorano's office for okay to hold.  Will request cardiology notes and testing.     Trang in Dr. Reyes's office notified of potassium 3.0 and stated she will have the NP Marielena look at her results.

## 2025-02-28 LAB
BACTERIA SPEC CULT: NORMAL
COLONY COUNT, CNT: NORMAL
COLONY COUNT, CNT: NORMAL
Lab: NORMAL

## 2025-02-28 NOTE — PERIOP NOTE
Request for office notes, recent testing, and holding Xarelto sent to Dr. Morton (hemotology) and Dr. Hu (cardiology).

## 2025-03-04 ENCOUNTER — TELEPHONE (OUTPATIENT)
Age: 75
End: 2025-03-04

## 2025-03-04 RX ORDER — POTASSIUM CHLORIDE 1500 MG/1
20 TABLET, EXTENDED RELEASE ORAL 2 TIMES DAILY
Qty: 2 TABLET | Refills: 0 | Status: SHIPPED | OUTPATIENT
Start: 2025-03-04 | End: 2025-03-05

## 2025-03-04 NOTE — TELEPHONE ENCOUNTER
Please let the patient know to  her potassium tabs (take 1 tab by mouth two times per day for 1 day) for K level of 3. If she is taking potassium tab on regular basis, disregard the order.

## 2025-03-07 ENCOUNTER — TELEPHONE (OUTPATIENT)
Age: 75
End: 2025-03-07

## 2025-03-07 NOTE — TELEPHONE ENCOUNTER
Patient called stating that she received a call yesterday from her cardiologist, Dr. French Hu. Patient was told that she is unable to have her scheduled surgery with Dr. Reyes at this time, due to concerns with anesthesia. Patient asked that the procedure be canceled.

## 2025-03-07 NOTE — TELEPHONE ENCOUNTER
S/w pt, to reschedule procedure until after her cardiology visit. Pt rescheduled w/ Dr. Reyes on 4/18/2025

## 2025-04-10 ENCOUNTER — TELEPHONE (OUTPATIENT)
Age: 75
End: 2025-04-10

## 2025-04-10 NOTE — TELEPHONE ENCOUNTER
Can you please check if patient got cleared for surgery on 4/18/2025  by cards Dr. French Hu   Thanks

## 2025-04-11 NOTE — PERIOP NOTE
Called patient to do PAT appointment, but she states her cardiac clearance isn't in, yet, and then also  her vascular doctor Dr. Carrero wants her AAA checked and to be cleared for Surgery.  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  Called Jesi at Dr. Reyes's office and she will call patient to reschedule and cancel the \"follow -up\" for April 15th since the follow-up is from the surgery that was rescheduled.

## 2025-05-13 ENCOUNTER — TELEPHONE (OUTPATIENT)
Age: 75
End: 2025-05-13

## 2025-05-13 NOTE — TELEPHONE ENCOUNTER
Can you please request  (cardiology) and DR. Carrero (vascular surgen) clearance for surgery      The patient told me that she had stress test in march of this year.  Notes from cards from 2024  She has clearance from hem/onc    She is scheduled  to have CYSTOURETHROSCOPY, LEFT URETEROSCOPY, NEPHROSCOPY, LASER LITHOTRIPSY, POSSIBLE STENT PLACEMENT     Thanks

## 2025-05-14 ENCOUNTER — TELEPHONE (OUTPATIENT)
Age: 75
End: 2025-05-14

## 2025-05-14 NOTE — TELEPHONE ENCOUNTER
Nav Carrero MD vascular surgery     Address: [address], [place] [address]  Phone: [phone]    Dr. French Hu MD  4.5(20) · Cardiologist  [address] · [place] · [phone]

## 2025-05-19 DIAGNOSIS — N20.0 KIDNEY STONES: Primary | ICD-10-CM

## 2025-05-20 ENCOUNTER — HOSPITAL ENCOUNTER (OUTPATIENT)
Facility: HOSPITAL | Age: 75
Discharge: HOME | End: 2025-05-23
Payer: MEDICARE

## 2025-05-20 VITALS
WEIGHT: 213 LBS | SYSTOLIC BLOOD PRESSURE: 153 MMHG | OXYGEN SATURATION: 97 % | DIASTOLIC BLOOD PRESSURE: 92 MMHG | HEIGHT: 66 IN | HEART RATE: 78 BPM | BODY MASS INDEX: 34.23 KG/M2 | TEMPERATURE: 98.5 F | RESPIRATION RATE: 16 BRPM

## 2025-05-20 DIAGNOSIS — N20.0 KIDNEY STONES: ICD-10-CM

## 2025-05-20 LAB
ANION GAP SERPL CALC-SCNC: 7 MMOL/L (ref 2–12)
BUN SERPL-MCNC: 15 MG/DL (ref 6–20)
BUN/CREAT SERPL: 21 (ref 12–20)
CA-I BLD-MCNC: 8.8 MG/DL (ref 8.5–10.1)
CHLORIDE SERPL-SCNC: 107 MMOL/L (ref 97–108)
CO2 SERPL-SCNC: 25 MMOL/L (ref 21–32)
CREAT SERPL-MCNC: 0.71 MG/DL (ref 0.55–1.02)
ERYTHROCYTE [DISTWIDTH] IN BLOOD BY AUTOMATED COUNT: 13.6 % (ref 11.5–14.5)
GLUCOSE SERPL-MCNC: 124 MG/DL (ref 65–100)
HCT VFR BLD AUTO: 36.9 % (ref 35–47)
HGB BLD-MCNC: 12.2 G/DL (ref 11.5–16)
MCH RBC QN AUTO: 28.7 PG (ref 26–34)
MCHC RBC AUTO-ENTMCNC: 33.1 G/DL (ref 30–36.5)
MCV RBC AUTO: 86.8 FL (ref 80–99)
NRBC # BLD: 0 K/UL (ref 0–0.01)
NRBC BLD-RTO: 0 PER 100 WBC
PLATELET # BLD AUTO: 171 K/UL (ref 150–400)
PMV BLD AUTO: 12.8 FL (ref 8.9–12.9)
POTASSIUM SERPL-SCNC: 3.2 MMOL/L (ref 3.5–5.1)
RBC # BLD AUTO: 4.25 M/UL (ref 3.8–5.2)
SODIUM SERPL-SCNC: 139 MMOL/L (ref 136–145)
WBC # BLD AUTO: 4.1 K/UL (ref 3.6–11)

## 2025-05-20 PROCEDURE — 87086 URINE CULTURE/COLONY COUNT: CPT

## 2025-05-20 PROCEDURE — 74018 RADEX ABDOMEN 1 VIEW: CPT

## 2025-05-20 PROCEDURE — 85027 COMPLETE CBC AUTOMATED: CPT

## 2025-05-20 PROCEDURE — 36415 COLL VENOUS BLD VENIPUNCTURE: CPT

## 2025-05-20 PROCEDURE — 80048 BASIC METABOLIC PNL TOTAL CA: CPT

## 2025-05-21 LAB
BACTERIA SPEC CULT: NORMAL
Lab: NORMAL

## 2025-05-22 ENCOUNTER — HOSPITAL ENCOUNTER (OUTPATIENT)
Facility: HOSPITAL | Age: 75
Setting detail: OUTPATIENT SURGERY
Discharge: HOME | End: 2025-05-22
Attending: UROLOGY | Admitting: UROLOGY
Payer: MEDICARE

## 2025-05-22 ENCOUNTER — TELEPHONE (OUTPATIENT)
Age: 75
End: 2025-05-22

## 2025-05-22 ENCOUNTER — ANESTHESIA EVENT (OUTPATIENT)
Facility: HOSPITAL | Age: 75
End: 2025-05-22
Payer: MEDICARE

## 2025-05-22 ENCOUNTER — ANESTHESIA (OUTPATIENT)
Facility: HOSPITAL | Age: 75
End: 2025-05-22
Payer: MEDICARE

## 2025-05-22 ENCOUNTER — APPOINTMENT (OUTPATIENT)
Facility: HOSPITAL | Age: 75
End: 2025-05-22
Attending: UROLOGY
Payer: MEDICARE

## 2025-05-22 VITALS
SYSTOLIC BLOOD PRESSURE: 152 MMHG | TEMPERATURE: 97.9 F | RESPIRATION RATE: 16 BRPM | DIASTOLIC BLOOD PRESSURE: 63 MMHG | HEART RATE: 72 BPM | OXYGEN SATURATION: 95 %

## 2025-05-22 DIAGNOSIS — R11.2 POST-OPERATIVE NAUSEA AND VOMITING: Primary | ICD-10-CM

## 2025-05-22 DIAGNOSIS — Z98.890 POST-OPERATIVE NAUSEA AND VOMITING: Primary | ICD-10-CM

## 2025-05-22 DIAGNOSIS — N20.0 LEFT RENAL STONE: ICD-10-CM

## 2025-05-22 DIAGNOSIS — G89.18 POST-OP PAIN: Primary | ICD-10-CM

## 2025-05-22 PROCEDURE — 6360000002 HC RX W HCPCS: Performed by: UROLOGY

## 2025-05-22 PROCEDURE — 88300 SURGICAL PATH GROSS: CPT

## 2025-05-22 PROCEDURE — 6360000002 HC RX W HCPCS: Performed by: NURSE ANESTHETIST, CERTIFIED REGISTERED

## 2025-05-22 PROCEDURE — 3600000004 HC SURGERY LEVEL 4 BASE: Performed by: UROLOGY

## 2025-05-22 PROCEDURE — 3700000001 HC ADD 15 MINUTES (ANESTHESIA): Performed by: UROLOGY

## 2025-05-22 PROCEDURE — 6360000002 HC RX W HCPCS: Performed by: ANESTHESIOLOGY

## 2025-05-22 PROCEDURE — 3600000014 HC SURGERY LEVEL 4 ADDTL 15MIN: Performed by: UROLOGY

## 2025-05-22 PROCEDURE — C2617 STENT, NON-COR, TEM W/O DEL: HCPCS | Performed by: UROLOGY

## 2025-05-22 PROCEDURE — 2500000003 HC RX 250 WO HCPCS: Performed by: NURSE ANESTHETIST, CERTIFIED REGISTERED

## 2025-05-22 PROCEDURE — 80047 BASIC METABLC PNL IONIZED CA: CPT

## 2025-05-22 PROCEDURE — 2580000003 HC RX 258: Performed by: UROLOGY

## 2025-05-22 PROCEDURE — 82360 CALCULUS ASSAY QUANT: CPT

## 2025-05-22 PROCEDURE — 2709999900 HC NON-CHARGEABLE SUPPLY: Performed by: UROLOGY

## 2025-05-22 PROCEDURE — 7100000011 HC PHASE II RECOVERY - ADDTL 15 MIN: Performed by: UROLOGY

## 2025-05-22 PROCEDURE — 2720000010 HC SURG SUPPLY STERILE: Performed by: UROLOGY

## 2025-05-22 PROCEDURE — 7100000010 HC PHASE II RECOVERY - FIRST 15 MIN: Performed by: UROLOGY

## 2025-05-22 PROCEDURE — 7100000001 HC PACU RECOVERY - ADDTL 15 MIN: Performed by: UROLOGY

## 2025-05-22 PROCEDURE — 7100000000 HC PACU RECOVERY - FIRST 15 MIN: Performed by: UROLOGY

## 2025-05-22 PROCEDURE — C1894 INTRO/SHEATH, NON-LASER: HCPCS | Performed by: UROLOGY

## 2025-05-22 PROCEDURE — C1769 GUIDE WIRE: HCPCS | Performed by: UROLOGY

## 2025-05-22 PROCEDURE — 6370000000 HC RX 637 (ALT 250 FOR IP): Performed by: UROLOGY

## 2025-05-22 PROCEDURE — C1747 HC ENDOSCOPE, SINGLE, URINARY TRACT: HCPCS | Performed by: UROLOGY

## 2025-05-22 PROCEDURE — 2500000003 HC RX 250 WO HCPCS: Performed by: UROLOGY

## 2025-05-22 PROCEDURE — 76000 FLUOROSCOPY <1 HR PHYS/QHP: CPT

## 2025-05-22 PROCEDURE — 6370000000 HC RX 637 (ALT 250 FOR IP): Performed by: ANESTHESIOLOGY

## 2025-05-22 PROCEDURE — 52356 CYSTO/URETERO W/LITHOTRIPSY: CPT | Performed by: UROLOGY

## 2025-05-22 PROCEDURE — 3700000000 HC ANESTHESIA ATTENDED CARE: Performed by: UROLOGY

## 2025-05-22 DEVICE — VARIABLE LENGTH URETERAL STENT
Type: IMPLANTABLE DEVICE | Site: URETER | Status: FUNCTIONAL
Brand: CONTOUR VL™

## 2025-05-22 RX ORDER — SODIUM CHLORIDE 0.9 % (FLUSH) 0.9 %
5-40 SYRINGE (ML) INJECTION PRN
Status: DISCONTINUED | OUTPATIENT
Start: 2025-05-22 | End: 2025-05-22 | Stop reason: HOSPADM

## 2025-05-22 RX ORDER — HYDRALAZINE HYDROCHLORIDE 20 MG/ML
10 INJECTION INTRAMUSCULAR; INTRAVENOUS
Status: DISCONTINUED | OUTPATIENT
Start: 2025-05-22 | End: 2025-05-22 | Stop reason: HOSPADM

## 2025-05-22 RX ORDER — SODIUM CHLORIDE 9 MG/ML
INJECTION, SOLUTION INTRAVENOUS PRN
Status: DISCONTINUED | OUTPATIENT
Start: 2025-05-22 | End: 2025-05-22 | Stop reason: HOSPADM

## 2025-05-22 RX ORDER — SODIUM CHLORIDE 0.9 % (FLUSH) 0.9 %
5-40 SYRINGE (ML) INJECTION EVERY 12 HOURS SCHEDULED
Status: DISCONTINUED | OUTPATIENT
Start: 2025-05-22 | End: 2025-05-22 | Stop reason: HOSPADM

## 2025-05-22 RX ORDER — CEFADROXIL 500 MG/1
500 CAPSULE ORAL 2 TIMES DAILY
Qty: 10 CAPSULE | Refills: 0 | Status: SHIPPED | OUTPATIENT
Start: 2025-05-22 | End: 2025-05-27

## 2025-05-22 RX ORDER — LIDOCAINE HYDROCHLORIDE 20 MG/ML
INJECTION, SOLUTION EPIDURAL; INFILTRATION; INTRACAUDAL; PERINEURAL
Status: DISCONTINUED | OUTPATIENT
Start: 2025-05-22 | End: 2025-05-22 | Stop reason: SDUPTHER

## 2025-05-22 RX ORDER — OXYCODONE AND ACETAMINOPHEN 5; 325 MG/1; MG/1
1 TABLET ORAL EVERY 6 HOURS PRN
Qty: 12 TABLET | Refills: 0 | Status: SHIPPED | OUTPATIENT
Start: 2025-05-22 | End: 2025-05-25

## 2025-05-22 RX ORDER — METOCLOPRAMIDE HYDROCHLORIDE 5 MG/ML
10 INJECTION INTRAMUSCULAR; INTRAVENOUS
Status: COMPLETED | OUTPATIENT
Start: 2025-05-22 | End: 2025-05-22

## 2025-05-22 RX ORDER — DEXAMETHASONE SODIUM PHOSPHATE 4 MG/ML
INJECTION, SOLUTION INTRA-ARTICULAR; INTRALESIONAL; INTRAMUSCULAR; INTRAVENOUS; SOFT TISSUE
Status: DISCONTINUED | OUTPATIENT
Start: 2025-05-22 | End: 2025-05-22 | Stop reason: SDUPTHER

## 2025-05-22 RX ORDER — ONDANSETRON 2 MG/ML
INJECTION INTRAMUSCULAR; INTRAVENOUS
Status: DISCONTINUED | OUTPATIENT
Start: 2025-05-22 | End: 2025-05-22 | Stop reason: SDUPTHER

## 2025-05-22 RX ORDER — ACETAMINOPHEN 500 MG
1000 TABLET ORAL ONCE
Status: COMPLETED | OUTPATIENT
Start: 2025-05-22 | End: 2025-05-22

## 2025-05-22 RX ORDER — NALOXONE HYDROCHLORIDE 0.4 MG/ML
INJECTION, SOLUTION INTRAMUSCULAR; INTRAVENOUS; SUBCUTANEOUS PRN
Status: DISCONTINUED | OUTPATIENT
Start: 2025-05-22 | End: 2025-05-22 | Stop reason: HOSPADM

## 2025-05-22 RX ORDER — DIPHENHYDRAMINE HYDROCHLORIDE 50 MG/ML
12.5 INJECTION, SOLUTION INTRAMUSCULAR; INTRAVENOUS
Status: DISCONTINUED | OUTPATIENT
Start: 2025-05-22 | End: 2025-05-22 | Stop reason: HOSPADM

## 2025-05-22 RX ORDER — LIDOCAINE HYDROCHLORIDE 20 MG/ML
JELLY TOPICAL PRN
Status: DISCONTINUED | OUTPATIENT
Start: 2025-05-22 | End: 2025-05-22 | Stop reason: HOSPADM

## 2025-05-22 RX ORDER — OXYCODONE HYDROCHLORIDE 5 MG/1
5 TABLET ORAL PRN
Status: DISCONTINUED | OUTPATIENT
Start: 2025-05-22 | End: 2025-05-22 | Stop reason: HOSPADM

## 2025-05-22 RX ORDER — PROCHLORPERAZINE EDISYLATE 5 MG/ML
5 INJECTION INTRAMUSCULAR; INTRAVENOUS ONCE
Status: COMPLETED | OUTPATIENT
Start: 2025-05-22 | End: 2025-05-22

## 2025-05-22 RX ORDER — PROPOFOL 10 MG/ML
INJECTION, EMULSION INTRAVENOUS
Status: DISCONTINUED | OUTPATIENT
Start: 2025-05-22 | End: 2025-05-22 | Stop reason: SDUPTHER

## 2025-05-22 RX ORDER — MEPERIDINE HYDROCHLORIDE 25 MG/ML
12.5 INJECTION INTRAMUSCULAR; INTRAVENOUS; SUBCUTANEOUS EVERY 5 MIN PRN
Status: DISCONTINUED | OUTPATIENT
Start: 2025-05-22 | End: 2025-05-22 | Stop reason: HOSPADM

## 2025-05-22 RX ORDER — METHENAMINE HIPPURATE 1000 MG/1
1 TABLET ORAL 2 TIMES DAILY PRN
Qty: 20 TABLET | Refills: 1 | Status: SHIPPED | OUTPATIENT
Start: 2025-05-22

## 2025-05-22 RX ORDER — OXYCODONE HYDROCHLORIDE 5 MG/1
10 TABLET ORAL PRN
Status: DISCONTINUED | OUTPATIENT
Start: 2025-05-22 | End: 2025-05-22 | Stop reason: HOSPADM

## 2025-05-22 RX ORDER — IPRATROPIUM BROMIDE AND ALBUTEROL SULFATE 2.5; .5 MG/3ML; MG/3ML
1 SOLUTION RESPIRATORY (INHALATION)
Status: DISCONTINUED | OUTPATIENT
Start: 2025-05-22 | End: 2025-05-22 | Stop reason: HOSPADM

## 2025-05-22 RX ORDER — ROCURONIUM BROMIDE 10 MG/ML
INJECTION, SOLUTION INTRAVENOUS
Status: DISCONTINUED | OUTPATIENT
Start: 2025-05-22 | End: 2025-05-22 | Stop reason: SDUPTHER

## 2025-05-22 RX ORDER — ONDANSETRON 2 MG/ML
4 INJECTION INTRAMUSCULAR; INTRAVENOUS
Status: COMPLETED | OUTPATIENT
Start: 2025-05-22 | End: 2025-05-22

## 2025-05-22 RX ORDER — ONDANSETRON 4 MG/1
4 TABLET, FILM COATED ORAL DAILY PRN
Qty: 30 TABLET | Refills: 0 | Status: SHIPPED | OUTPATIENT
Start: 2025-05-22

## 2025-05-22 RX ORDER — FENTANYL CITRATE 0.05 MG/ML
50 INJECTION, SOLUTION INTRAMUSCULAR; INTRAVENOUS EVERY 5 MIN PRN
Status: DISCONTINUED | OUTPATIENT
Start: 2025-05-22 | End: 2025-05-22 | Stop reason: HOSPADM

## 2025-05-22 RX ORDER — SODIUM CHLORIDE, SODIUM LACTATE, POTASSIUM CHLORIDE, CALCIUM CHLORIDE 600; 310; 30; 20 MG/100ML; MG/100ML; MG/100ML; MG/100ML
INJECTION, SOLUTION INTRAVENOUS CONTINUOUS
Status: DISCONTINUED | OUTPATIENT
Start: 2025-05-22 | End: 2025-05-22 | Stop reason: HOSPADM

## 2025-05-22 RX ORDER — DEXTROSE MONOHYDRATE 100 MG/ML
INJECTION, SOLUTION INTRAVENOUS CONTINUOUS PRN
Status: DISCONTINUED | OUTPATIENT
Start: 2025-05-22 | End: 2025-05-22 | Stop reason: HOSPADM

## 2025-05-22 RX ORDER — GLUCAGON 1 MG/ML
1 KIT INJECTION PRN
Status: DISCONTINUED | OUTPATIENT
Start: 2025-05-22 | End: 2025-05-22 | Stop reason: HOSPADM

## 2025-05-22 RX ORDER — FENTANYL CITRATE 0.05 MG/ML
INJECTION, SOLUTION INTRAMUSCULAR; INTRAVENOUS
Status: DISCONTINUED | OUTPATIENT
Start: 2025-05-22 | End: 2025-05-22 | Stop reason: SDUPTHER

## 2025-05-22 RX ORDER — HYDROMORPHONE HYDROCHLORIDE 1 MG/ML
0.5 INJECTION, SOLUTION INTRAMUSCULAR; INTRAVENOUS; SUBCUTANEOUS EVERY 5 MIN PRN
Status: DISCONTINUED | OUTPATIENT
Start: 2025-05-22 | End: 2025-05-22 | Stop reason: HOSPADM

## 2025-05-22 RX ORDER — ATROPA BELLADONNA AND OPIUM 16.2; 3 MG/1; MG/1
SUPPOSITORY RECTAL PRN
Status: DISCONTINUED | OUTPATIENT
Start: 2025-05-22 | End: 2025-05-22 | Stop reason: HOSPADM

## 2025-05-22 RX ORDER — LORAZEPAM 2 MG/ML
0.5 INJECTION INTRAMUSCULAR
Status: DISCONTINUED | OUTPATIENT
Start: 2025-05-22 | End: 2025-05-22 | Stop reason: HOSPADM

## 2025-05-22 RX ORDER — LABETALOL HYDROCHLORIDE 5 MG/ML
10 INJECTION, SOLUTION INTRAVENOUS
Status: DISCONTINUED | OUTPATIENT
Start: 2025-05-22 | End: 2025-05-22 | Stop reason: HOSPADM

## 2025-05-22 RX ADMIN — CEFAZOLIN 2000 MG: 1 INJECTION, POWDER, FOR SOLUTION INTRAMUSCULAR; INTRAVENOUS at 10:26

## 2025-05-22 RX ADMIN — FENTANYL CITRATE 25 MCG: 50 INJECTION INTRAMUSCULAR; INTRAVENOUS at 10:37

## 2025-05-22 RX ADMIN — FENTANYL CITRATE 25 MCG: 50 INJECTION INTRAMUSCULAR; INTRAVENOUS at 10:31

## 2025-05-22 RX ADMIN — ROCURONIUM BROMIDE 50 MG: 10 INJECTION, SOLUTION INTRAVENOUS at 10:31

## 2025-05-22 RX ADMIN — ROCURONIUM BROMIDE 10 MG: 10 INJECTION, SOLUTION INTRAVENOUS at 11:08

## 2025-05-22 RX ADMIN — ONDANSETRON 4 MG: 2 INJECTION INTRAMUSCULAR; INTRAVENOUS at 12:57

## 2025-05-22 RX ADMIN — PROPOFOL 120 MG: 10 INJECTION, EMULSION INTRAVENOUS at 10:31

## 2025-05-22 RX ADMIN — LIDOCAINE HYDROCHLORIDE 100 MG: 20 SOLUTION INTRAVENOUS at 10:31

## 2025-05-22 RX ADMIN — ACETAMINOPHEN 1000 MG: 500 TABLET, FILM COATED ORAL at 13:12

## 2025-05-22 RX ADMIN — PROCHLORPERAZINE EDISYLATE 5 MG: 5 INJECTION INTRAMUSCULAR; INTRAVENOUS at 15:54

## 2025-05-22 RX ADMIN — METOCLOPRAMIDE 10 MG: 5 INJECTION, SOLUTION INTRAMUSCULAR; INTRAVENOUS at 14:23

## 2025-05-22 RX ADMIN — SUGAMMADEX 200 MG: 100 INJECTION, SOLUTION INTRAVENOUS at 11:36

## 2025-05-22 RX ADMIN — ONDANSETRON 4 MG: 2 INJECTION INTRAMUSCULAR; INTRAVENOUS at 11:30

## 2025-05-22 RX ADMIN — SUGAMMADEX 200 MG: 100 INJECTION, SOLUTION INTRAVENOUS at 11:39

## 2025-05-22 RX ADMIN — SODIUM CHLORIDE, POTASSIUM CHLORIDE, SODIUM LACTATE AND CALCIUM CHLORIDE: 600; 310; 30; 20 INJECTION, SOLUTION INTRAVENOUS at 10:26

## 2025-05-22 RX ADMIN — FENTANYL CITRATE 50 MCG: 0.05 INJECTION, SOLUTION INTRAMUSCULAR; INTRAVENOUS at 12:10

## 2025-05-22 RX ADMIN — DEXAMETHASONE SODIUM PHOSPHATE 8 MG: 4 INJECTION, SOLUTION INTRA-ARTICULAR; INTRALESIONAL; INTRAMUSCULAR; INTRAVENOUS; SOFT TISSUE at 10:38

## 2025-05-22 ASSESSMENT — PAIN DESCRIPTION - LOCATION
LOCATION: ABDOMEN

## 2025-05-22 ASSESSMENT — PAIN DESCRIPTION - DESCRIPTORS
DESCRIPTORS: ACHING;SORE
DESCRIPTORS: BURNING
DESCRIPTORS: BURNING
DESCRIPTORS: SORE

## 2025-05-22 ASSESSMENT — PAIN - FUNCTIONAL ASSESSMENT
PAIN_FUNCTIONAL_ASSESSMENT: 0-10

## 2025-05-22 ASSESSMENT — PAIN DESCRIPTION - ORIENTATION: ORIENTATION: LEFT

## 2025-05-22 ASSESSMENT — PAIN SCALES - GENERAL
PAINLEVEL_OUTOF10: 0
PAINLEVEL_OUTOF10: 0
PAINLEVEL_OUTOF10: 1
PAINLEVEL_OUTOF10: 4
PAINLEVEL_OUTOF10: 4
PAINLEVEL_OUTOF10: 0
PAINLEVEL_OUTOF10: 0
PAINLEVEL_OUTOF10: 6

## 2025-05-22 NOTE — ANESTHESIA PRE PROCEDURE
Department of Anesthesiology  Preprocedure Note       Name:  Stacie Atwood   Age:  74 y.o.  :  1950                                          MRN:  [id number]         Date:  2025      Surgeon: Surgeon(s):  Lopez Reyes MD    Procedure: Procedure(s):  CYSTOURETHROSCOPY, LEFT URETEROSCOPY, NEPHROSCOPY, LASER LITHOTRIPSY, POSSIBLE STENT PLACEMENT    Medications prior to admission:   Prior to Admission medications    Medication Sig Start Date End Date Taking? Authorizing Provider   vitamin D (VITAMIN D3) 50 MCG ( UT) CAPS capsule Take 1 capsule by mouth daily   Yes Latisha Mendez MD   Probiotic Product (PROBIOTIC GUMMIES PO) Take 1 Piece of gum by mouth daily   Yes Latisha Mendez MD   Biotin 1000 MCG CHEW Take 1 tablet by mouth daily   Yes Latisha Mendez MD   Cholecalciferol (VITAMIN D3 ADULT GUMMIES) 25 MCG (1000 UT) CHEW Take 1 gum by mouth daily   Yes Latisha Mendez MD   rivaroxaban (XARELTO) 10 MG TABS tablet Take 1 tablet by mouth daily (with breakfast)   Yes Latisha Mendez MD   metoprolol succinate (TOPROL XL) 50 MG extended release tablet Take 1 tablet by mouth daily 10/17/23  Yes ProviderLatisha MD   folic acid (FOLVITE) 1 MG tablet Take 1 tablet by mouth daily 19  Yes Latisha Mendez MD   bumetanide (BUMEX) 1 MG tablet Take 1 tablet by mouth daily 19  Yes ProviderLatisha MD   acetaminophen (TYLENOL) 500 MG CAPS capsule Take 1 capsule by mouth every 6 hours as needed for Pain   Yes Automatic Reconciliation, Ar   potassium chloride (KLOR-CON M) 20 MEQ extended release tablet Take 1 tablet by mouth 2 times daily for 1 day 3/4/25 5/20/25  Marielena Cooper, APRN - NP   Potassium Citrate ER (UROCIT-K) 15 MEQ (1620 MG) TBCR extended release tablet Take 1 tablet by mouth 2 times daily (with meals)  Patient not taking: Reported on 2025 10/21/24   Lopez Reyes MD   potassium chloride (KLOR-CON M10) 10 MEQ extended

## 2025-05-22 NOTE — OP NOTE
Operative Note      Patient: Stacie Atwood  YOB: 1950  MRN: [id number]    Date of Procedure: 5/22/2025    Pre-Op Diagnosis Codes:      * Left renal stone [N20.0]    Post-Op Diagnosis: Same       Procedure(s):  CYSTOURETHROSCOPY, LEFT URETEROSCOPY, NEPHROSCOPY, LASER LITHOTRIPSY, STENT PLACEMENT    Surgeon(s):  Lopez Reyes MD    Assistant:   * No surgical staff found *    Anesthesia: General    Estimated Blood Loss (mL): Minimal    Complications: None    Specimens:   ID Type Source Tests Collected by Time Destination   A : Kidney stones Stone (Calculus) Kidney STONE ANALYSIS Lopez Reyes MD 5/22/2025 1110        Implants:  Implant Name Type Inv. Item Serial No.  Lot No. LRB No. Used Action   STENT URET 7FR [id number] PERCFLX HYDR+ SFT PGTL TAPR TIP - [id number]  STENT URET 7FR [id number] PERCFLX HYDR+ SFT PGTL TAPR TIP  [employer]- [id number] Left 1 Implanted         Drains: * No LDAs found *    Findings:  Infection Present At Time Of Surgery (PATOS) (choose all levels that have infection present):  No infection present  Other Findings: Left renal stone    Detailed Description of Procedure:   Patient has a left renal stone with intermittent pain.  Patient is set up for nephroscopy with laser lithotripsy.  She is aware the risk of bleeding infection injury to kidney ureter vascular injury pulm embolus death she is aware of alternatives including ESWL she has no questions  Procedure patient prepped and draped in usual sterile fashion after undergoing general anesthesia placed lithotomy position.  Timeout was performed preoperative antibiotics were given SCDs were applied  Patient was scope with a 21 [place] Islander cystoscope into the bladder there were no stones or tumors appreciated.  Left ureteral orifice was identified.  A 035 Glidewire was passed up the left ureteral orifice up to the kidney under diagnostic fluoroscopy.  A 36 cm-12/14 ureteral dilator was passed over the

## 2025-05-22 NOTE — PROGRESS NOTES
Dr. Reyes at patient's bedside. Per MD, he would like to see patient for a follow up before patient's scheduled follow up (June 2nd) for stent removal. Notified Dr. Reyes's office. Stated they would call patient with sooner appt. Dr. Reyes notified pt to hold xarelto until stent is removed. Pt verbalized understanding.

## 2025-05-22 NOTE — ANESTHESIA POSTPROCEDURE EVALUATION
Department of Anesthesiology  Postprocedure Note    Patient: Stacie Atwood  MRN: [id number]  YOB: 1950  Date of evaluation: 5/22/2025    Procedure Summary       Date: 05/22/25 Room / Location: [facility] MAIN OR 01 / [facility] MAIN OR    Anesthesia Start: 1026 Anesthesia Stop: 1148    Procedure: CYSTOURETHROSCOPY, LEFT URETEROSCOPY, NEPHROSCOPY, LASER LITHOTRIPSY, STENT PLACEMENT (Left: Ureter) Diagnosis:       Left renal stone      (Left renal stone [N20.0])    Surgeons: Lopez Reyes MD Responsible Provider: Snow Ly MD    Anesthesia Type: General ASA Status: 2            Anesthesia Type: General    Nya Phase I: Nya Score: 9    Nya Phase II: Nya Score: 10    Anesthesia Post Evaluation    Patient location during evaluation: PACU  Patient participation: complete - patient participated  Level of consciousness: awake  Airway patency: patent  Nausea & Vomiting: no nausea and no vomiting  Cardiovascular status: hemodynamically stable  Respiratory status: acceptable  Hydration status: euvolemic  Pain management: adequate    No notable events documented.

## 2025-05-22 NOTE — PROGRESS NOTES
1547--Patient nauseous after receiving Decadron, Zofran and Reglan. Notified Dr. Shelley, anesthesiologist. Telephone orders received for 1x dose 5mg IV Compazine. Dr. Reyes also aware of post op nausea. Stated he will prescribe Zofran at discharge. Notified pt. Pt stated she would like to rest at this time. Will re-assess. Pt's daughter at bedside. Pt condition stable, call bell within reach.   1558-- Compazine given.   1621-- Pt has no complaints of nausea at this time. Discharge instructions printed and provided to patient and pt's daughter, Kamila with all questions answered in full. PIV x1 removed with cath tip intact. Pt VSS and no signs of distress noted. Pt able to ambulate to restroom and urinate prior to discharge. Pt aware that Dr. Reyes's office will call her with sooner follow up appt. Pt aware of last/next dose of Tylenol and Zofran. Pt tolerating liquids and solids with no issues. Pt ambulating within room with no issues. Pt wheeled down to main entrance accompanied by staff. Pt condition stable at time of discharge.

## 2025-05-22 NOTE — TELEPHONE ENCOUNTER
Princess to schedule appt for stent removal per Dr. Reyes, confirmed appt with pt daughter, Vivi [phone]

## 2025-05-23 ENCOUNTER — RESULTS FOLLOW-UP (OUTPATIENT)
Age: 75
End: 2025-05-23

## 2025-05-28 ENCOUNTER — PROCEDURE VISIT (OUTPATIENT)
Age: 75
End: 2025-05-28
Payer: MEDICARE

## 2025-05-28 VITALS — SYSTOLIC BLOOD PRESSURE: 145 MMHG | HEART RATE: 93 BPM | DIASTOLIC BLOOD PRESSURE: 87 MMHG

## 2025-05-28 DIAGNOSIS — N20.0 LEFT RENAL STONE: Primary | ICD-10-CM

## 2025-05-28 DIAGNOSIS — R11.0 NAUSEA: ICD-10-CM

## 2025-05-28 DIAGNOSIS — R39.15 URGENCY OF MICTURITION: ICD-10-CM

## 2025-05-28 DIAGNOSIS — Z96.0 RETAINED URETERAL STENT: ICD-10-CM

## 2025-05-28 DIAGNOSIS — R35.0 FREQUENCY OF MICTURITION: ICD-10-CM

## 2025-05-28 DIAGNOSIS — R30.0 DYSURIA: ICD-10-CM

## 2025-05-28 DIAGNOSIS — R82.81 PYURIA: ICD-10-CM

## 2025-05-28 LAB
BILIRUBIN, URINE, POC: NEGATIVE
BLOOD URINE, POC: ABNORMAL
GLUCOSE URINE, POC: NEGATIVE
KETONES, URINE, POC: NEGATIVE
LEUKOCYTE ESTERASE, URINE, POC: ABNORMAL
NITRITE, URINE, POC: NEGATIVE
PH, URINE, POC: 6 (ref 4.6–8)
PROTEIN,URINE, POC: 300
SPECIFIC GRAVITY, URINE, POC: 1.02 (ref 1–1.03)
URINALYSIS CLARITY, POC: CLEAR
URINALYSIS COLOR, POC: ABNORMAL
UROBILINOGEN, POC: ABNORMAL

## 2025-05-28 PROCEDURE — G8427 DOCREV CUR MEDS BY ELIG CLIN: HCPCS | Performed by: UROLOGY

## 2025-05-28 PROCEDURE — G8399 PT W/DXA RESULTS DOCUMENT: HCPCS | Performed by: UROLOGY

## 2025-05-28 PROCEDURE — 1159F MED LIST DOCD IN RCRD: CPT | Performed by: UROLOGY

## 2025-05-28 PROCEDURE — 81003 URINALYSIS AUTO W/O SCOPE: CPT | Performed by: UROLOGY

## 2025-05-28 PROCEDURE — G8417 CALC BMI ABV UP PARAM F/U: HCPCS | Performed by: UROLOGY

## 2025-05-28 PROCEDURE — 3017F COLORECTAL CA SCREEN DOC REV: CPT | Performed by: UROLOGY

## 2025-05-28 PROCEDURE — 1090F PRES/ABSN URINE INCON ASSESS: CPT | Performed by: UROLOGY

## 2025-05-28 PROCEDURE — 1125F AMNT PAIN NOTED PAIN PRSNT: CPT | Performed by: UROLOGY

## 2025-05-28 PROCEDURE — 1123F ACP DISCUSS/DSCN MKR DOCD: CPT | Performed by: UROLOGY

## 2025-05-28 PROCEDURE — 1036F TOBACCO NON-USER: CPT | Performed by: UROLOGY

## 2025-05-28 PROCEDURE — 99214 OFFICE O/P EST MOD 30 MIN: CPT | Performed by: UROLOGY

## 2025-05-28 RX ORDER — SCOPOLAMINE 1 MG/3D
1 PATCH, EXTENDED RELEASE TRANSDERMAL
Qty: 5 PATCH | Refills: 0 | Status: SHIPPED | OUTPATIENT
Start: 2025-05-28

## 2025-05-28 RX ORDER — CIPROFLOXACIN 250 MG/1
250 TABLET, FILM COATED ORAL 2 TIMES DAILY
Qty: 10 TABLET | Refills: 1 | Status: SHIPPED | OUTPATIENT
Start: 2025-05-28 | End: 2025-06-02

## 2025-05-28 NOTE — PROGRESS NOTES
Chief Complaint   Patient presents with    Post-Op Check     5/22/25     BP (!) 145/87 (BP Site: Right Upper Arm, Patient Position: Sitting, BP Cuff Size: Large Adult)   Pulse 93       1. Have you been to the ER, urgent care clinic since your last visit?  Hospitalized since your last visit?    2. Have you seen or consulted any other health care providers outside of the [facility] since your last visit?  Include any pap smears or colon screening.

## 2025-05-28 NOTE — PROGRESS NOTES
HISTORY OF PRESENT ILLNESS  Stacie Atwood is a 74 y.o. female   Patient returns today with a history urgency frequency think she has a UTI.  She still has a stent in place and thought maybe the stent but should rather go ahead and treat her cystitis symptoms first and come back to have her stent removed on the June 2 as has been scheduled.  Additionally she is having nausea and wants a scopolamine patch behind her ear because that works best.  She has been warned about the side effects including ulcerogenic when taking potassium medication  1. Left renal stone  Overview:   Ct scan on 1/16/2025  reveled 7 mm stone lower pole left collecting system, 6 mm nonobstructing right  renal stone. No hydronephrosis.    She is s/p CYSTOURETHROSCOPY, LEFT URETEROSCOPY, NEPHROSCOPY, LASER LITHOTRIPSY, STENT PLACEMENT on 5/22/2025  Findings: Left renal stone     Orders:  -     AMB POC URINALYSIS DIP STICK AUTO W/O MICRO  -     Culture, Urine  2. Pyuria  -     AMB POC URINALYSIS DIP STICK AUTO W/O MICRO  -     Culture, Urine  3. Retained ureteral stent      PAST MEDICAL HISTORY  PMHx (including negatives):  has a past medical history of AAA (abdominal aortic aneurysm), Arthritis, DVT (deep venous thrombosis) ([facility]), Hx of blood clots, Hyperlipidemia, Hypertension, Pulmonary embolus (HCC), and Vertigo.   PSurgHx:  has a past surgical history that includes Hysterectomy; IR GUIDED THROMB MECH NONCOR ART SBSQ VESSEL (07/28/2022); joint replacement (Bilateral); Cholecystectomy; Bilateral oophorectomy; Colonoscopy; eye surgery (Bilateral); Multiple tooth extractions; Lithotripsy (Left, 04/12/2024); Cervical polyp removal; and Cystoscopy (Left, 5/22/2025).  PSocHx:  reports that she has never smoked. She has never used smokeless tobacco. She reports that she does not drink alcohol and does not use drugs.   FamilyHX:   Family History   Problem Relation Age of Onset    Stroke Mother     Prostate Cancer Father     Stroke Father

## 2025-05-29 LAB
ANION GAP BLD CALC-SCNC: 14
CA-I BLD-MCNC: 1.16 MMOL/L (ref 1.12–1.32)
CHLORIDE BLD-SCNC: 104 MMOL/L (ref 98–107)
CO2 BLD-SCNC: 28 MMOL/L
CREAT UR-MCNC: 0.7 MG/DL (ref 0.6–1.3)
GLUCOSE BLD STRIP.AUTO-MCNC: 107 MG/DL (ref 65–100)
POTASSIUM BLD-SCNC: 3.2 MMOL/L (ref 3.5–5.5)
SODIUM BLD-SCNC: 145 MMOL/L (ref 136–145)

## 2025-05-30 LAB — BACTERIA UR CULT: ABNORMAL

## 2025-06-01 LAB
2,8 DIHYDROXYADENINE: NORMAL
AMM URATE MFR STONE: NORMAL %
BILIRUBIN, STONE: NORMAL
CA BILIRUB MFR STONE: NORMAL %
CA CARBONATE MFR STONE: NORMAL %
CA H2 PHOS DIHYD MFR STONE: NORMAL %
CA PHOS MFR STONE: NORMAL %
CALCIUM CARBONATE CRYSTALS, STONE: NORMAL %
CALCIUM HYDROXYL CRYSTALS, STONE: NORMAL %
CALCIUM OXALATE DIHYDRATE MFR STONE IR: 30 %
CALCIUM PALMITATE: NORMAL
CALCIUM STEARATE: NORMAL
CHOLEST MFR STONE: NORMAL %
COLOR STONE: NORMAL
COM MFR STONE: 70 %
CYSTINE MFR STONE: NORMAL %
DRUG OR METABOLITE: NORMAL
LABORATORY COMMENT REPORT: NORMAL
NA URATE MFR STONE IR: NORMAL %
NEWBERYITE MFR STONE: NORMAL %
SIZE STONE: NORMAL MM
SPECIMEN SOURCE: NORMAL
STONE ANALYSIS-IMP: NORMAL
TRI-PHOS MFR STONE: NORMAL %
TRIAMTERENE MFR STONE: NORMAL %
UNCOMMON COMPONENTS: NORMAL
URATE DIHYD MFR STONE: NORMAL %
URATE MFR STONE: NORMAL %
WT STONE: 82 MG
XANTHINE, STONE: NORMAL

## 2025-06-02 ENCOUNTER — PROCEDURE VISIT (OUTPATIENT)
Age: 75
End: 2025-06-02
Payer: MEDICARE

## 2025-06-02 DIAGNOSIS — R82.81 PYURIA: ICD-10-CM

## 2025-06-02 DIAGNOSIS — N20.0 LEFT RENAL STONE: ICD-10-CM

## 2025-06-02 DIAGNOSIS — N20.0 RIGHT RENAL STONE: ICD-10-CM

## 2025-06-02 DIAGNOSIS — B96.20 E-COLI UTI: ICD-10-CM

## 2025-06-02 DIAGNOSIS — Z96.0 RETAINED URETERAL STENT: ICD-10-CM

## 2025-06-02 DIAGNOSIS — R39.15 URGENCY OF MICTURITION: Primary | ICD-10-CM

## 2025-06-02 DIAGNOSIS — N39.0 E-COLI UTI: ICD-10-CM

## 2025-06-02 LAB
BILIRUBIN, URINE, POC: ABNORMAL
BLOOD URINE, POC: ABNORMAL
GLUCOSE URINE, POC: NEGATIVE
KETONES, URINE, POC: NEGATIVE
LEUKOCYTE ESTERASE, URINE, POC: ABNORMAL
NITRITE, URINE, POC: NEGATIVE
PH, URINE, POC: 6 (ref 4.6–8)
PROTEIN,URINE, POC: 300
SPECIFIC GRAVITY, URINE, POC: 1.02 (ref 1–1.03)
URINALYSIS CLARITY, POC: CLEAR
URINALYSIS COLOR, POC: YELLOW
UROBILINOGEN, POC: ABNORMAL

## 2025-06-02 PROCEDURE — 52310 CYSTOSCOPY AND TREATMENT: CPT | Performed by: UROLOGY

## 2025-06-02 PROCEDURE — G8399 PT W/DXA RESULTS DOCUMENT: HCPCS | Performed by: UROLOGY

## 2025-06-02 PROCEDURE — 99214 OFFICE O/P EST MOD 30 MIN: CPT | Performed by: UROLOGY

## 2025-06-02 PROCEDURE — G8428 CUR MEDS NOT DOCUMENT: HCPCS | Performed by: UROLOGY

## 2025-06-02 PROCEDURE — G8417 CALC BMI ABV UP PARAM F/U: HCPCS | Performed by: UROLOGY

## 2025-06-02 PROCEDURE — 1090F PRES/ABSN URINE INCON ASSESS: CPT | Performed by: UROLOGY

## 2025-06-02 PROCEDURE — 1123F ACP DISCUSS/DSCN MKR DOCD: CPT | Performed by: UROLOGY

## 2025-06-02 PROCEDURE — 81003 URINALYSIS AUTO W/O SCOPE: CPT | Performed by: UROLOGY

## 2025-06-02 PROCEDURE — 3017F COLORECTAL CA SCREEN DOC REV: CPT | Performed by: UROLOGY

## 2025-06-02 PROCEDURE — 1036F TOBACCO NON-USER: CPT | Performed by: UROLOGY

## 2025-06-02 RX ORDER — CIPROFLOXACIN 500 MG/1
500 TABLET, FILM COATED ORAL ONCE
Status: COMPLETED | OUTPATIENT
Start: 2025-06-02 | End: 2025-06-02

## 2025-06-02 RX ADMIN — CIPROFLOXACIN 500 MG: 500 TABLET, FILM COATED ORAL at 12:15

## 2025-06-02 NOTE — PROGRESS NOTES
HISTORY OF PRESENT ILLNESS  Stacie Atwood is a 74 y.o. female   Patient returns today doing well.  Her urgency frequency is better after treating her with Cipro.  She still has a stent in place.  We talked about her stone disease we mention briefly about her right renal stone which we will set up for ESWL as she returns back and her 24-hour urine is back  1. Urgency of micturition  Overview:  5/28/2025 post  op visit  with cc of urgency and frequency.  She is s/p left nephroscopy left lithotripsy on 5/22/2025  UA  from 5/28/2025 positive for E-coli. Tx with cipro      Orders:  -     ciprofloxacin (CIPRO) tablet 500 mg; 500 mg, Oral, ONCE, 1 dose, On Mon 6/2/25 at 1215Antimicrobial Indications: Surgical ProphylaxisDo not take with dairy products or calcium-fortified juices. Tube feeding (TF) interaction, obtain physician order to manage. Recommend holding TF for 1 hr before and 1 hr after dose. Due to decreased absorption do not give by J tube.     -     AMB POC URINALYSIS DIP STICK AUTO W/O MICRO  2. Left renal stone  Overview:   Ct scan on 1/16/2025  reveled 7 mm stone lower pole left collecting system, 6 mm nonobstructing right  renal stone. No hydronephrosis.    She is s/p CYSTOURETHROSCOPY, LEFT URETEROSCOPY, NEPHROSCOPY, LASER LITHOTRIPSY, STENT PLACEMENT on 5/22/2025  Findings: Left renal stone   She is here for stent removal  Orders:  -     ciprofloxacin (CIPRO) tablet 500 mg; 500 mg, Oral, ONCE, 1 dose, On Mon 6/2/25 at 1215Antimicrobial Indications: Surgical ProphylaxisDo not take with dairy products or calcium-fortified juices. Tube feeding (TF) interaction, obtain physician order to manage. Recommend holding TF for 1 hr before and 1 hr after dose. Due to decreased absorption do not give by J tube.     -     AMB POC URINALYSIS DIP STICK AUTO W/O MICRO  3. Pyuria  -     Culture, Urine  4. E-coli UTI      PAST MEDICAL HISTORY  PMHx (including negatives):  has a past medical history of AAA (abdominal

## 2025-06-02 NOTE — PROGRESS NOTES
CYSTOSCOPY/ STENT REMOVAL  Patient presented for cystoscopy and ureteral stent removal.  The patient was placed supine on the procedure table and the genitals were prepped and with chlorhexidine.  Using 16 [place] flexible cystoscope, cystourethroscopy was performed.    The urethra was patent without stricturing.    The visualized portions of the bladder mucosa was without trabeculation, tumors or concerning erythema.    There was a stent in the left ureter. It was grasped and withdrawn intact.

## 2025-06-03 ENCOUNTER — RESULTS FOLLOW-UP (OUTPATIENT)
Age: 75
End: 2025-06-03

## 2025-06-04 LAB — BACTERIA UR CULT: NORMAL

## 2025-06-27 LAB
AMMONIA 24H UR-SRATE: 17 MMOL/24 HR (ref 15–60)
CA H2 PHOS DIHYD 24H SATFR UR: 1.27 (ref 0.5–2)
CALCIUM 24H UR-MRATE: 180 MG/24 HR
CALCIUM/CREAT 24H UR: 185 MG/G CREAT (ref 51–262)
CALCIUM/KG BODY WEIGHT: 1.9 MG/24 HR/KG
CAOX INDEX 24H UR-RTO: 5.4 (ref 6–10)
CHLORIDE 24H UR-SRATE: 124 MMOL/24 HR (ref 70–250)
CITRATE 24H UR-MRATE: 514 MG/24 HR
CREAT 24H UR-MRATE: 974 MG/24 HR
CREAT/BW 24H UR-RELMRAT: 10.5 MG/24 HR/KG (ref 8.7–20.3)
CYSTINE 24H UR QL: ABNORMAL
LABORATORY COMMENT REPORT: ABNORMAL
MAGNESIUM 24H UR-MRATE: 76 MG/24 HR (ref 30–120)
OXALATE 24H UR-MRATE: 28 MG/24 HR (ref 20–40)
PH 24H UR: 6.46 [PH] (ref 5.8–6.2)
PHOSPHATE 24H UR-MRATE: 636 MG/24 HR (ref 600–1200)
POTASSIUM 24H UR-SRATE: 53 MMOL/24 HR (ref 20–100)
PROTEIN CATABOLIC RATE 24H UR-MRATE: 0.6 G/KG/24 HR (ref 0.8–1.4)
SODIUM 24H UR-SRATE: 122 MMOL/24 HR (ref 50–150)
SPECIMEN VOL 24H UR: 1930 ML/24 HR (ref 500–4000)
SULFATE 24H UR-SRATE: 23 MEQ/24 HR (ref 20–80)
URATE 24H SATFR UR: 0.16
URATE 24H UR-MRATE: 390 MG/24 HR
UUN 24H UR-MRATE: 5.63 G/24 HR (ref 6–14)

## (undated) DEVICE — SOLUTION SCRB 4OZ 4% CHG H2O AIDED FOR PREOPERATIVE SKIN

## (undated) DEVICE — BAG,DRAINAGE,ANTI-REFLUX TOWER,2000ML: Brand: MEDLINE

## (undated) DEVICE — SOUTHSIDE TURNOVER: Brand: MEDLINE INDUSTRIES, INC.

## (undated) DEVICE — URETERAL ACCESS SHEATH SET: Brand: NAVIGATOR HD

## (undated) DEVICE — TUBING, SUCTION, 1/4" X 12', STRAIGHT: Brand: MEDLINE

## (undated) DEVICE — CYSTO PACK: Brand: MEDLINE INDUSTRIES, INC.

## (undated) DEVICE — NCIRCLE, NITINOL TIPLESS STONE EXTRACTOR MODIFIED BASKET: Brand: NCIRCLE

## (undated) DEVICE — SEAL INSTR PRT SELF SEAL

## (undated) DEVICE — DRAPE EQUIP C ARM 74X42 IN MOB XR W/ TIE RUBBER BND LF

## (undated) DEVICE — RADIFOCUS GLIDEWIRE: Brand: GLIDEWIRE

## (undated) DEVICE — GARMENT,MEDLINE,DVT,INT,CALF,MED, GEN2: Brand: MEDLINE

## (undated) DEVICE — URETEROSCOPE FLX 100 DEG FLD VW SHFT L 670 MM WORKING

## (undated) DEVICE — NEPTUNE E-SEP SMOKE EVACUATION PENCIL, COATED, 70MM BLADE, PUSH BUTTON SWITCH: Brand: NEPTUNE E-SEP

## (undated) DEVICE — PREP PAD BNS: Brand: CONVERTORS

## (undated) DEVICE — SOL IRR SOD CHL 0.9% TITAN XL CNTNR 3000ML

## (undated) DEVICE — CATHETER ETER URET 5FR L70CM 0038IN FLX OPN TIP NO SIDE EYE